# Patient Record
Sex: MALE | Race: WHITE | NOT HISPANIC OR LATINO | Employment: FULL TIME | URBAN - METROPOLITAN AREA
[De-identification: names, ages, dates, MRNs, and addresses within clinical notes are randomized per-mention and may not be internally consistent; named-entity substitution may affect disease eponyms.]

---

## 2017-01-20 ENCOUNTER — OFFICE VISIT (OUTPATIENT)
Dept: FAMILY MEDICINE | Facility: CLINIC | Age: 37
End: 2017-01-20
Payer: COMMERCIAL

## 2017-01-20 VITALS
BODY MASS INDEX: 35.21 KG/M2 | OXYGEN SATURATION: 98 % | SYSTOLIC BLOOD PRESSURE: 116 MMHG | HEIGHT: 72 IN | TEMPERATURE: 97.9 F | WEIGHT: 260 LBS | HEART RATE: 75 BPM | DIASTOLIC BLOOD PRESSURE: 74 MMHG

## 2017-01-20 DIAGNOSIS — M25.562 ACUTE PAIN OF LEFT KNEE: Primary | ICD-10-CM

## 2017-01-20 DIAGNOSIS — K21.9 GASTROESOPHAGEAL REFLUX DISEASE WITHOUT ESOPHAGITIS: ICD-10-CM

## 2017-01-20 PROCEDURE — 99214 OFFICE O/P EST MOD 30 MIN: CPT | Performed by: PHYSICIAN ASSISTANT

## 2017-01-20 RX ORDER — TRAMADOL HYDROCHLORIDE 50 MG/1
TABLET ORAL
Qty: 30 TABLET | Refills: 0 | Status: SHIPPED | OUTPATIENT
Start: 2017-01-20 | End: 2017-09-26

## 2017-01-20 NOTE — PROGRESS NOTES
SUBJECTIVE:                                                    Paco Ponce is a 36 year old male who presents to clinic today for the following health issues:    GERD/Heartburn     Onset: was at dentist 2 days ago, said has a lot of acid in mouth     Description:     Burning in chest: no    Intensity: mild    Progression of Symptoms: same    Accompanying Signs & Symptoms:  Does it feel like food gets stuck: no  Nausea: no  Vomiting (bloody?): no  Abdominal Pain: no  Black-Tarry stools: no:  Bloody stools: no   History:   Previous ulcers: no    Precipitating factors:   Caffeine use: YES- has cut back recently  Alcohol use: no  NSAID/Aspirin use: YES  Tobacco use: no  Worse with no particular food or drink.    Alleviating factors:  none         Therapies Tried and outcome:chewable antacid    Patient reports needing significant dental work over the past few years. He has evidence of enamel loss on his teeth, and his dentist is concerned that he may have reflux.    He states that he drinks 2 sodas per day, as well as coffee in the morning (approx 10 oz). No longer drinks energy drinks    Occasionally gets heartburn symptoms, depending on what he eats. Certain barbecue sauces have been a trigger. He states that chocolate, peppermint, tomato-based foods are not triggers  Does not drink alcohol frequently  Does not use tobacco  Has been using NSAIDs more recently due to joint pains    He denies hoarse voice, trouble swallowing, abdominal pain, change in stools, halitosis    Has not been on a PPI in the past    PSH: History of intestinal resection secondary to ?diverticulitis    Joint Pain     Onset: 2 weeks     Description:   Location: left knee  Character: Sharp and Dull ache    Intensity: 8/10    Progression of Symptoms: same    Accompanying Signs & Symptoms:  Other symptoms: none   History:   Previous similar pain: no       Precipitating factors:   Trauma or overuse: no     Alleviating factors:  Improved by:  nothing       Therapies Tried and outcome: massage, naproxen    Patient reports severe knee pain over the past 2 weeks  He does not recall a particular injury, but does a lot of active work (works in [employer])  Does put a lot of pressure on his knees while working  Unable to cross his leg to put on his shoes due to pain. Pain becomes sharp and more severe with walking and going up/down stairs  Sitting is the most comfortable position for him, but does still have a constant ache at rest  Has been favoring his right leg as a result of the pain  Does feel snapping sensation in the left knee when he walks  Pain localized to the medial aspect of the knee, anterior and posterior  Leg has not given way on him, but he is concerned about that and states he feels that the knee is unstable  No prior history of knee problems  Denies any other joint pains  Denies swelling, redness, warmth  Pain is even severe during the middle of the night. Pain also severe as soon as he gets up in the morning    No ankle/hip pain.    No FH of ortho problems.    Problem list and histories reviewed & adjusted, as indicated.  Additional history: as documented    Patient Active Problem List   Diagnosis     CARDIOVASCULAR SCREENING; LDL GOAL LESS THAN 160     Hypertension goal BP (blood pressure) < 140/90     Obesity     Past Surgical History   Procedure Laterality Date     Appendectomy       Gi surgery       Hernia repair       Ent surgery         Social History   Substance Use Topics     Smoking status: Never Smoker      Smokeless tobacco: Never Used     Alcohol Use: 0.0 oz/week     0 Standard drinks or equivalent per week      Comment: per wk 1-2     No family history on file.      Current Outpatient Prescriptions   Medication Sig Dispense Refill     omeprazole (PRILOSEC) 20 MG CR capsule Take 1 capsule (20 mg) by mouth daily 90 capsule 1     traMADol (ULTRAM) 50 MG tablet Take 1-2 tablets by mouth at bedtime as needed 30 tablet 0     metoprolol  (TOPROL-XL) 25 MG 24 hr tablet Take 1 tablet (25 mg) by mouth daily 90 tablet 3     No Known Allergies    ROS:  Constitutional, HEENT, cardiovascular, pulmonary, gi and gu systems are negative, except as otherwise noted.    OBJECTIVE:                                                    /74 mmHg  Pulse 75  Temp(Src) 97.9  F (36.6  C) (Oral)  Ht 6' (1.829 m)  Wt 260 lb (117.935 kg)  BMI 35.25 kg/m2  SpO2 98%  Body mass index is 35.25 kg/(m^2).  GENERAL: healthy, alert and no distress  HENT: ear canals and TM's normal, nose and mouth without ulcers or lesions  NECK: no adenopathy, no asymmetry, masses, or scars and thyroid normal to palpation  RESP: lungs clear to auscultation - no rales, rhonchi or wheezes  CV: regular rate and rhythm, normal S1 S2, no S3 or S4, no murmur, click or rub, no peripheral edema and peripheral pulses strong  ABDOMEN: soft, nontender, no hepatosplenomegaly, no masses and bowel sounds normal  MS: Tenderness to palpation of medial joint line of left knee. Medial hamstring tendon also tender to palpation. No effusions. Negative patellar grind test. Negative anterior/posterior drawer. No pain with varus/valgus stress. Pain with flexion and extension of the knee. Normal hip and ankle range of motion  SKIN: no suspicious lesions or rashes    Diagnostic Test Results:  No results found for this or any previous visit (from the past 24 hour(s)).     ASSESSMENT/PLAN:                                                      1. Acute pain of left knee  Severity of pain and mechanical symptoms are concerning for possible meniscal tear. We'll proceed with MRI for evaluation. Patient was advised to continue with ice and over-the-counter pain relievers. Prescription for tramadol given for nighttime use only. Discussed possible consult with orthopedics depending on MRI results. Also discussed possibility of starting physical therapy  - MR Knee Left w/o Contrast; Future  - traMADol (ULTRAM) 50 MG  tablet; Take 1-2 tablets by mouth at bedtime as needed  Dispense: 30 tablet; Refill: 0    2. Gastroesophageal reflux disease without esophagitis  Dentist has noticed loss of enamel in teeth. Patient has required multiple dental procedures. Dentist has advised him on proper brushing technique. Recommend trial of PPI in the event that he has an element of silent reflux. Does have infrequent episodes of heartburn. Reviewed lifestyle changes to help with reflux, including dietary changes and elevating the head of the bed. Discussed possible side effects of Prilosec  - omeprazole (PRILOSEC) 20 MG CR capsule; Take 1 capsule (20 mg) by mouth daily  Dispense: 90 capsule; Refill: 1    See Patient Instructions    Bianka Saavedra PA-C  [facility]

## 2017-01-20 NOTE — PATIENT INSTRUCTIONS
Lifestyle Changes for Controlling GERD  When you have GERD, stomach acid feels as if it s backing up toward your mouth. Whether or not you take medication to control your GERD, your symptoms can often be improved with lifestyle changes. Talk to your doctor about the following suggestions, which may help you get relief from your symptoms.  Raise Your Head    Reflux is more likely to strike when you re lying down flat, because stomach fluid can flow backward more easily. Raising the head of your bed 4 to 6 inches can help. To do this:    Slide blocks or books under the legs at the head of your bed. Or, place a wedge under the mattress. Many [employer] can make a suitable wedge for you. The wedge should run from your waist to the top of your head.    Don t just prop your head on several pillows. This increases pressure on your stomach. It can make GERD worse.  Watch Your Eating Habits  Certain foods may increase the acid in your stomach or relax the lower esophageal sphincter, making GERD more likely. It s best to avoid the following:    Coffee, tea, and carbonated drinks (with and without caffeine)    Fatty, fried, or spicy food    Mint, chocolate, onions, and tomatoes    Any other foods that seem to irritate your stomach or cause you pain  Relieve the Pressure    Eat smaller meals, even if you have to eat more often.    Don t lie down right after you eat. Wait a few hours for your stomach to empty.    Avoid tight belts and tight-fitting clothes.    Lose excess weight.  Tobacco and Alcohol  Avoid smoking tobacco and drinking alcohol. They can make GERD symptoms worse.    9278-5315 [employer]. [address] [place] [address], [place] [address]. All rights reserved. This information is not intended as a substitute for professional medical care. Always follow your healthcare professional's instructions.

## 2017-01-20 NOTE — MR AVS SNAPSHOT
After Visit Summary   1/20/2017    Paco Ponce    MRN: [id number]           Patient Information     Date Of Birth          1980        Visit Information        Provider Department      1/20/2017 11:00 AM Bianka Saavedra PA-C [facility] [place]        Today's Diagnoses     Acute pain of left knee    -  1     Gastroesophageal reflux disease without esophagitis           Care Instructions      Lifestyle Changes for Controlling GERD  When you have GERD, stomach acid feels as if it s backing up toward your mouth. Whether or not you take medication to control your GERD, your symptoms can often be improved with lifestyle changes. Talk to your doctor about the following suggestions, which may help you get relief from your symptoms.  Raise Your Head    Reflux is more likely to strike when you re lying down flat, because stomach fluid can flow backward more easily. Raising the head of your bed 4 to 6 inches can help. To do this:    Slide blocks or books under the legs at the head of your bed. Or, place a wedge under the mattress. Many [employer] can make a suitable wedge for you. The wedge should run from your waist to the top of your head.    Don t just prop your head on several pillows. This increases pressure on your stomach. It can make GERD worse.  Watch Your Eating Habits  Certain foods may increase the acid in your stomach or relax the lower esophageal sphincter, making GERD more likely. It s best to avoid the following:    Coffee, tea, and carbonated drinks (with and without caffeine)    Fatty, fried, or spicy food    Mint, chocolate, onions, and tomatoes    Any other foods that seem to irritate your stomach or cause you pain  Relieve the Pressure    Eat smaller meals, even if you have to eat more often.    Don t lie down right after you eat. Wait a few hours for your stomach to empty.    Avoid tight belts and tight-fitting clothes.    Lose excess weight.  Tobacco and Alcohol  Avoid  "smoking tobacco and drinking alcohol. They can make GERD symptoms worse.    8726-0934 [employer]. [address] [place] [address], [place] [address]. All rights reserved. This information is not intended as a substitute for professional medical care. Always follow your healthcare professional's instructions.              Follow-ups after your visit        Future tests that were ordered for you today     Open Future Orders        Priority Expected Expires Ordered    MR Knee Left w/o Contrast Routine  2018            Who to contact     If you have questions or need follow up information about today's clinic visit or your schedule please contact [facility] [place] directly at [phone].  Normal or non-critical lab and imaging results will be communicated to you by MyChart, letter or phone within 4 business days after the clinic has received the results. If you do not hear from us within 7 days, please contact the clinic through [employer] or phone. If you have a critical or abnormal lab result, we will notify you by phone as soon as possible.  Submit refill requests through [employer] or call your pharmacy and they will forward the refill request to us. Please allow 3 business days for your refill to be completed.          Additional Information About Your Visit        MyChart Information     [employer] lets you send messages to your doctor, view your test results, renew your prescriptions, schedule appointments and more. To sign up, go to [email or web address] . Click on \"Log in\" on the left side of the screen, which will take you to the Welcome page. Then click on \"Sign up Now\" on the right side of the page.     You will be asked to enter the access code listed below, as well as some personal information. Please follow the directions to create your username and password.     Your access code is: [email or web address]  Expires: 3/13/2017 11:19 AM     Your access code will  in 90 days. If you need " help or a new code, please call your [facility] or [phone].        Care EveryWhere ID     This is your Care EveryWhere ID. This could be used by other organizations to access your [place] medical records  [id number]        Your Vitals Were     Pulse Temperature Height BMI (Body Mass Index) Pulse Oximetry       75 97.9  F (36.6  C) (Oral) 6' (1.829 m) 35.25 kg/m2 98%        Blood Pressure from Last 3 Encounters:   01/20/17 116/74   12/13/16 122/82   09/28/16 136/88    Weight from Last 3 Encounters:   01/20/17 260 lb (117.935 kg)   12/13/16 255 lb (115.667 kg)   03/26/16 264 lb (119.75 kg)                 Today's Medication Changes          These changes are accurate as of: 1/20/17 11:43 AM.  If you have any questions, ask your nurse or doctor.               Start taking these medicines.        Dose/Directions    omeprazole 20 MG CR capsule   Commonly known as:  priLOSEC   Used for:  Gastroesophageal reflux disease without esophagitis   Started by:  Bianka Saavedra PA-C        Dose:  20 mg   Take 1 capsule (20 mg) by mouth daily   Quantity:  90 capsule   Refills:  1            Where to get your medicines      These medications were sent to [facility] [id number] [place] [facility] [place], [place] - [address]  [address] [facility] [address] [place] [address]     Phone:  [phone]    - omeprazole 20 MG CR capsule             Primary Care Provider Office Phone # Fax #    Zhao Kirkland Jr., [phone]       [facility] [address] [facility] [address]        Thank you!     Thank you for choosing [facility]  for your care. Our goal is always to provide you with excellent care. Hearing back from our patients is one way we can continue to improve our services. Please take a few minutes to complete the written survey that you may receive in the mail after your visit with us. Thank you!             Your Updated Medication List - Protect others around you: Learn how to safely use,  store and throw away your medicines at www.disposemymeds.org.          This list is accurate as of: 1/20/17 11:43 AM.  Always use your most recent med list.                   Brand Name Dispense Instructions for use    metoprolol 25 MG 24 hr tablet    TOPROL-XL    90 tablet    Take 1 tablet (25 mg) by mouth daily       omeprazole 20 MG CR capsule    priLOSEC    90 capsule    Take 1 capsule (20 mg) by mouth daily

## 2017-01-20 NOTE — NURSING NOTE
Chief Complaint   Patient presents with     Knee Pain     Heartburn       Initial /74 mmHg  Pulse 75  Temp(Src) 97.9  F (36.6  C) (Oral)  Ht 6' (1.829 m)  Wt 260 lb (117.935 kg)  BMI 35.25 kg/m2  SpO2 98% Estimated body mass index is 35.25 kg/(m^2) as calculated from the following:    Height as of this encounter: 6' (1.829 m).    Weight as of this encounter: 260 lb (117.935 kg).  BP completed using cuff size: cristy Toth MA

## 2017-01-23 ENCOUNTER — HOSPITAL ENCOUNTER (OUTPATIENT)
Dept: MRI IMAGING | Facility: CLINIC | Age: 37
Discharge: HOME | End: 2017-01-23
Attending: PHYSICIAN ASSISTANT | Admitting: PHYSICIAN ASSISTANT
Payer: COMMERCIAL

## 2017-01-23 DIAGNOSIS — M25.562 ACUTE PAIN OF LEFT KNEE: ICD-10-CM

## 2017-01-23 PROCEDURE — 73721 MRI JNT OF LWR EXTRE W/O DYE: CPT | Mod: LT

## 2017-01-25 ENCOUNTER — TELEPHONE (OUTPATIENT)
Dept: ORTHOPEDICS | Facility: CLINIC | Age: 37
End: 2017-01-25

## 2017-01-25 ENCOUNTER — OFFICE VISIT (OUTPATIENT)
Dept: ORTHOPEDICS | Facility: CLINIC | Age: 37
End: 2017-01-25
Payer: COMMERCIAL

## 2017-01-25 VITALS
HEIGHT: 72 IN | WEIGHT: 260 LBS | SYSTOLIC BLOOD PRESSURE: 124 MMHG | DIASTOLIC BLOOD PRESSURE: 86 MMHG | BODY MASS INDEX: 35.21 KG/M2

## 2017-01-25 DIAGNOSIS — S83.242A TEAR OF MEDIAL MENISCUS OF LEFT KNEE, CURRENT, UNSPECIFIED TEAR TYPE, INITIAL ENCOUNTER: Primary | ICD-10-CM

## 2017-01-25 DIAGNOSIS — S83.242A TEAR OF MEDIAL MENISCUS OF LEFT KNEE, UNSPECIFIED TEAR TYPE, UNSPECIFIED WHETHER OLD OR CURRENT TEAR, INITIAL ENCOUNTER: Primary | ICD-10-CM

## 2017-01-25 DIAGNOSIS — S83.249A TEAR OF MEDIAL MENISCUS OF KNEE, CURRENT, UNSPECIFIED LATERALITY, UNSPECIFIED TEAR TYPE, INITIAL ENCOUNTER: Primary | ICD-10-CM

## 2017-01-25 PROCEDURE — 99204 OFFICE O/P NEW MOD 45 MIN: CPT | Performed by: ORTHOPAEDIC SURGERY

## 2017-01-25 NOTE — NURSING NOTE
Chief Complaint   Patient presents with     Knee Pain     Left knee       Initial /86 mmHg  Ht 6' (1.829 m)  Wt 260 lb (117.935 kg)  BMI 35.25 kg/m2 Estimated body mass index is 35.25 kg/(m^2) as calculated from the following:    Height as of this encounter: 6' (1.829 m).    Weight as of this encounter: 260 lb (117.935 kg).  BP completed using cuff size: large

## 2017-01-25 NOTE — PROGRESS NOTES
HISTORY OF PRESENT ILLNESS:    Paco Ponce is a 36 year old male who is seen in consultation at the request of Bianka Saavedra PA-C for left knee pain.    Present symptoms: Pt states knee pain has been present for 2-3 weeks.  Pt denies any known injury. He has observed that twisting, getting up from sitting, deep bending increases the pain along the medial aspect of the knee. He has not had actual mechanical loose body sensation or locking. He does not any pain on the right knee. He started to limp.  He does commercial heating and air-conditioning mechanical work. He is constantly on his knees with deep bending. He does not have a radicular symptoms. No history of trauma. No significant effusion. No redness or warmth.    Treatments tried to this point: MRI, tramadol  Orthopedic PMH: no ortho surgeries     Past Medical History   Diagnosis Date     Hypertension        Past Surgical History   Procedure Laterality Date     Appendectomy       Hernia repair       Ent surgery       Gi surgery         No family history on file.    Social History     Social History     Marital Status:      Spouse Name: N/A     Number of Children: N/A     Years of Education: N/A     Occupational History     Not on file.     Social History Main Topics     Smoking status: Never Smoker      Smokeless tobacco: Never Used     Alcohol Use: 0.0 oz/week     0 Standard drinks or equivalent per week      Comment: per wk 1-2     Drug Use: No     Sexual Activity:     Partners: Female     Other Topics Concern     Not on file     Social History Narrative       Current Outpatient Prescriptions   Medication Sig Dispense Refill     omeprazole (PRILOSEC) 20 MG CR capsule Take 1 capsule (20 mg) by mouth daily 90 capsule 1     traMADol (ULTRAM) 50 MG tablet Take 1-2 tablets by mouth at bedtime as needed 30 tablet 0     metoprolol (TOPROL-XL) 25 MG 24 hr tablet Take 1 tablet (25 mg) by mouth daily 90 tablet 3       No Known Allergies    REVIEW OF  SYSTEMS:  CONSTITUTIONAL:  NEGATIVE for fever, chills, change in weight  INTEGUMENTARY/SKIN:  NEGATIVE for worrisome rashes, moles or lesions  EYES:  NEGATIVE for vision changes or irritation  ENT/MOUTH:  NEGATIVE for ear, mouth and throat problems  RESP:  NEGATIVE for significant cough or SOB  BREAST:  NEGATIVE for masses, tenderness or discharge  CV:  Hypertension. NEGATIVE for chest pain, palpitations or peripheral edema  GI:  Heartburn  :  Negative   MUSCULOSKELETAL:  See HPI above  NEURO:  NEGATIVE for weakness, dizziness or paresthesias  ENDOCRINE:  NEGATIVE for temperature intolerance, skin/hair changes  HEME/ALLERGY/IMMUNE:  NEGATIVE for bleeding problems  PSYCHIATRIC:  NEGATIVE for changes in mood or affect      PHYSICAL EXAM:  Ht 6' (1.829 m)  Wt 260 lb (117.935 kg)  BMI 35.25 kg/m2  Body mass index is 35.25 kg/(m^2).   GENERAL APPEARANCE: healthy, alert and no distress   HEENT: No apparent thyroid megaly. Clear sclera with normal ocular movement  RESPIRATORY: No labored breathing  SKIN: no suspicious lesions or rashes  NEURO: Normal strength and tone, mentation intact and speech normal  VASCULAR: Good pulses, and capillary refill   LYMPH: no lymphadenopathy   PSYCH:  mentation appears normal and affect normal/bright    MUSCULOSKELETAL:  His gait is suction with a limp  Pain restricting deep flexion of the left knee compared to the right knee  Extension is full  Laura's is negative however circumduction maneuver increases the pain  Ligaments are stable throughout bilaterally  No significant effusion is noted  Well localized posterior medial joint line pain  He also has mild patellofemoral crepitus and localized pain with patellofemoral compression, left knee  Distal neurovascular status is intact     ASSESSMENT:  Left knee pain with MRI scan documentation of medial meniscus tear    PLAN:  We visualized images of MRI scan and findings were thoroughly explained. With the lack of other obvious  pathology, namely, articular cartilage defect or ligamentous injuries, his symptoms were felt to be os likely coming from the meniscus pathology.  In his case, doing knee arthroscopy was felt to be most appropriate. Each of the surgery and potential complications were thoroughly discussed.  Even though we discussed possible meniscus repair as an option, most likely this will be trimming of the torn meniscus tissue.  What to expect from the surgery was informed.  We anticipate about two weeks of time off from work after the operation.  All the questions were answered.  We will try to get him on the schedule sometime next week.      Imaging Interpretation:     Recent Results (from the past 744 hour(s))   MR Knee Left w/o Contrast    Narrative    MR KNEE LEFT WITHOUT CONTRAST January 23, 2017 5:52 PM    HISTORY: Acute left knee pain, pain in left knee.    TECHNIQUE: Sagittal proton density and T2, coronal T1, and coronal and  transverse fat suppressed T2 weighted images.    COMPARISON: None.    FINDINGS:   Medial Meniscus: Ill-defined obliquely oriented tear posterior horn  medial meniscus reaching both the superior and inferior articular  surfaces likely a flap type tear.    Lateral Meniscus: No tear, displaced fragment, or extrusion.       Anterior Cruciate Ligament: Intact.     Posterior Cruciate Ligament: Intact.     Medial Collateral Ligament: Intact.    Lateral Collateral Ligament Complex, Popliteus Tendon: The fibular  collateral ligament, biceps femoris tendon, popliteal tendon, and  iliotibial band are intact.    Osseous Structures and Cartilaginous Surfaces: Articular cartilage  surfaces in the medial, lateral, and patellofemoral compartments  appear within normal limits. Marrow signal is within normal limits.    Extensor Mechanism: The quadriceps and infrapatellar tendons are  intact. The medial and lateral patellar retinacula appear  unremarkable.    Joint Space: There is a physiological amount of fluid in  the joint  space.  No definite articular bodies are demonstrated.    Additional Findings: No semimembranosus-tibial collateral ligament or  pes anserine bursitis. Very small Baker's cyst.      Impression    IMPRESSION:    1. Obliquely oriented tear posterior horn medial meniscus reaching  both superior and inferior articular surfaces likely a flap type tear.  2. Very small Peters's cyst.    MD Yordan RUIZ MD  Department of Orthopedic Surgery        Disclaimer: This note consists of symbols derived from keyboarding, dictation and/or voice recognition software. As a result, there may be errors in the script that have gone undetected. Please consider this when interpreting information found in this chart.

## 2017-01-25 NOTE — MR AVS SNAPSHOT
"              After Visit Summary   1/25/2017    Paco Ponce    MRN: [id number]           Patient Information     Date Of Birth          1980        Visit Information        Provider Department      1/25/2017 10:20 AM Yordan Main MD [facility] ORTHOPEDIC SURGERY        Care Instructions    We discussed knee arthroscopy. Here are a few common questions/concerns people have regarding this procedure:    Crutches: Crutches are used for comfort and safety. You can wean off according to your own judgement. If there is a specific weight bear restriction, you will be informed.    Dressing: Very rarely, there could be a significant drainage of bloody fluid. This is due to the fact that we use a lot of saline at the time of the surgery. Please don't be alarmed if you see the drainage in the amount more than you expected. The only thing you need to do is to enforce the dressing with additional dressing material (extra gauze, old towel or even diapers) on top of the original dressing without removing it. You are recommended to leave the dressing for 2 days before starting showers. Put  band aids on the holes after each shower until the holes are closed which takes 7- 10 days. During this time, you should not soak the knee, that is, no bathing.    Medication: You will be given a prescription for narcotic pain medication(Tylenol #3, Vicodin or Percocet) from the hospital. Most of people take them for a day or two at the most. After that, using it at night for sleep would be appropriate. From the beginning, you could rely on medications such as Ibuprofen (Advil) or Naproxen(Aleve) instead of narcotic pain medication or along with narcotic medication so that you can be off narcotic pain medication.    Pain: Typically pain is mild but very different for each individual. The first 2 days you may have less pain than the subsequent days. It is helpful not to \"over do\" right after the surgery even if you have " "very little to no pain. Taking things gradually is better than being too aggressive with activities. Icing and elevation will help with pain. For swelling control, icing is not as effective after 3-4 days after the surgery.    Driving: It would be mostly up to your judgement. You can drive whenever you are off the narcotic pain medication and you have the control of the leg.                            Follow-ups after your visit        Who to contact     If you have questions or need follow up information about today's clinic visit or your schedule please contact [facility] ORTHOPEDIC SURGERY directly at [phone].  Normal or non-critical lab and imaging results will be communicated to you by mWaterhart, letter or phone within 4 business days after the clinic has received the results. If you do not hear from us within 7 days, please contact the clinic through [facility] or phone. If you have a critical or abnormal lab result, we will notify you by phone as soon as possible.  Submit refill requests through [facility] or call your pharmacy and they will forward the refill request to us. Please allow 3 business days for your refill to be completed.          Additional Information About Your Visit        mWaterharRogate Information     [facility] lets you send messages to your doctor, view your test results, renew your prescriptions, schedule appointments and more. To sign up, go to [email or web address] . Click on \"Log in\" on the left side of the screen, which will take you to the Welcome page. Then click on \"Sign up Now\" on the right side of the page.     You will be asked to enter the access code listed below, as well as some personal information. Please follow the directions to create your username and password.     Your access code is: [email or web address]  Expires: 3/13/2017 11:19 AM     Your access code will  in 90 days. If you need help or a new code, please call your [place] clinic or [phone].        Care EveryWhere " ID     This is your Care EveryWhere ID. This could be used by other organizations to access your [facility] medical records  [id number]        Your Vitals Were     Height BMI (Body Mass Index)                6' (1.829 m) 35.25 kg/m2           Blood Pressure from Last 3 Encounters:   01/25/17 124/86   01/20/17 116/74   12/13/16 122/82    Weight from Last 3 Encounters:   01/25/17 260 lb (117.935 kg)   01/20/17 260 lb (117.935 kg)   12/13/16 255 lb (115.667 kg)              Today, you had the following     No orders found for display       Primary Care Provider Office Phone # Fax #    Zhao Kirkalnd Jr., [phone]       [facility] [address] [address]        Thank you!     Thank you for choosing [facility] ORTHOPEDIC SURGERY  for your care. Our goal is always to provide you with excellent care. Hearing back from our patients is one way we can continue to improve our services. Please take a few minutes to complete the written survey that you may receive in the mail after your visit with us. Thank you!             Your Updated Medication List - Protect others around you: Learn how to safely use, store and throw away your medicines at www.disposemymeds.org.          This list is accurate as of: 1/25/17 10:40 AM.  Always use your most recent med list.                   Brand Name Dispense Instructions for use    metoprolol 25 MG 24 hr tablet    TOPROL-XL    90 tablet    Take 1 tablet (25 mg) by mouth daily       omeprazole 20 MG CR capsule    priLOSEC    90 capsule    Take 1 capsule (20 mg) by mouth daily       traMADol 50 MG tablet    ULTRAM    30 tablet    Take 1-2 tablets by mouth at bedtime as needed

## 2017-01-25 NOTE — TELEPHONE ENCOUNTER
Scheduled surgery for left knee arthroscopy on 2/01/2017 with Dr. Main @ [facility] @ 11:15.  Surgery education packet provided to patient.

## 2017-01-25 NOTE — PATIENT INSTRUCTIONS
"We discussed knee arthroscopy. Here are a few common questions/concerns people have regarding this procedure:    Crutches: Crutches are used for comfort and safety. You can wean off according to your own judgement. If there is a specific weight bear restriction, you will be informed.    Dressing: Very rarely, there could be a significant drainage of bloody fluid. This is due to the fact that we use a lot of saline at the time of the surgery. Please don't be alarmed if you see the drainage in the amount more than you expected. The only thing you need to do is to enforce the dressing with additional dressing material (extra gauze, old towel or even diapers) on top of the original dressing without removing it. You are recommended to leave the dressing for 2 days before starting showers. Put  band aids on the holes after each shower until the holes are closed which takes 7- 10 days. During this time, you should not soak the knee, that is, no bathing.    Medication: You will be given a prescription for narcotic pain medication(Tylenol #3, Vicodin or Percocet) from the hospital. Most of people take them for a day or two at the most. After that, using it at night for sleep would be appropriate. From the beginning, you could rely on medications such as Ibuprofen (Advil) or Naproxen(Aleve) instead of narcotic pain medication or along with narcotic medication so that you can be off narcotic pain medication.    Pain: Typically pain is mild but very different for each individual. The first 2 days you may have less pain than the subsequent days. It is helpful not to \"over do\" right after the surgery even if you have very little to no pain. Taking things gradually is better than being too aggressive with activities. Icing and elevation will help with pain. For swelling control, icing is not as effective after 3-4 days after the surgery.    Driving: It would be mostly up to your judgement. You can drive whenever you are off the " narcotic pain medication and you have the control of the leg.

## 2017-01-30 ENCOUNTER — OFFICE VISIT (OUTPATIENT)
Dept: FAMILY MEDICINE | Facility: CLINIC | Age: 37
End: 2017-01-30
Payer: COMMERCIAL

## 2017-01-30 ENCOUNTER — THERAPY VISIT (OUTPATIENT)
Dept: PHYSICAL THERAPY | Facility: CLINIC | Age: 37
End: 2017-01-30
Payer: COMMERCIAL

## 2017-01-30 VITALS
HEIGHT: 72 IN | SYSTOLIC BLOOD PRESSURE: 128 MMHG | TEMPERATURE: 98.6 F | DIASTOLIC BLOOD PRESSURE: 80 MMHG | OXYGEN SATURATION: 98 % | HEART RATE: 82 BPM | WEIGHT: 260 LBS | BODY MASS INDEX: 35.21 KG/M2

## 2017-01-30 DIAGNOSIS — I10 HYPERTENSION GOAL BP (BLOOD PRESSURE) < 140/90: ICD-10-CM

## 2017-01-30 DIAGNOSIS — E66.01 MORBID OBESITY DUE TO EXCESS CALORIES (H): ICD-10-CM

## 2017-01-30 DIAGNOSIS — S83.242D TEAR OF MEDIAL MENISCUS OF LEFT KNEE, CURRENT, UNSPECIFIED TEAR TYPE, SUBSEQUENT ENCOUNTER: ICD-10-CM

## 2017-01-30 DIAGNOSIS — S83.249A TEAR OF MEDIAL MENISCUS OF KNEE: Primary | ICD-10-CM

## 2017-01-30 DIAGNOSIS — Z01.818 PREOP GENERAL PHYSICAL EXAM: Primary | ICD-10-CM

## 2017-01-30 PROCEDURE — 97110 THERAPEUTIC EXERCISES: CPT | Mod: GP | Performed by: PHYSICAL THERAPIST

## 2017-01-30 PROCEDURE — 97161 PT EVAL LOW COMPLEX 20 MIN: CPT | Mod: GP | Performed by: PHYSICAL THERAPIST

## 2017-01-30 PROCEDURE — 99214 OFFICE O/P EST MOD 30 MIN: CPT | Performed by: FAMILY MEDICINE

## 2017-01-30 ASSESSMENT — ACTIVITIES OF DAILY LIVING (ADL)
SIT WITH YOUR KNEE BENT: ACTIVITY IS VERY DIFFICULT
WEAKNESS: THE SYMPTOM AFFECTS MY ACTIVITY MODERATELY
RISE FROM A CHAIR: ACTIVITY IS SOMEWHAT DIFFICULT
GO DOWN STAIRS: ACTIVITY IS SOMEWHAT DIFFICULT
SQUAT: ACTIVITY IS FAIRLY DIFFICULT
STIFFNESS: THE SYMPTOM AFFECTS MY ACTIVITY MODERATELY
GIVING WAY, BUCKLING OR SHIFTING OF KNEE: I DO NOT HAVE THE SYMPTOM
RAW_SCORE: 39
KNEE_ACTIVITY_OF_DAILY_LIVING_SCORE: 55.71
AS_A_RESULT_OF_YOUR_KNEE_INJURY,_HOW_WOULD_YOU_RATE_YOUR_CURRENT_LEVEL_OF_DAILY_ACTIVITY?: ABNORMAL
PAIN: THE SYMPTOM AFFECTS MY ACTIVITY MODERATELY
KNEEL ON THE FRONT OF YOUR KNEE: ACTIVITY IS FAIRLY DIFFICULT
HOW_WOULD_YOU_RATE_THE_CURRENT_FUNCTION_OF_YOUR_KNEE_DURING_YOUR_USUAL_DAILY_ACTIVITIES_ON_A_SCALE_FROM_0_TO_100_WITH_100_BEING_YOUR_LEVEL_OF_KNEE_FUNCTION_PRIOR_TO_YOUR_INJURY_AND_0_BEING_THE_INABILITY_TO_PERFORM_ANY_OF_YOUR_USUAL_DAILY_ACTIVITIES?: 70
WALK: ACTIVITY IS SOMEWHAT DIFFICULT
STAND: ACTIVITY IS MINIMALLY DIFFICULT
KNEE_ACTIVITY_OF_DAILY_LIVING_SUM: 39
HOW_WOULD_YOU_RATE_THE_OVERALL_FUNCTION_OF_YOUR_KNEE_DURING_YOUR_USUAL_DAILY_ACTIVITIES?: ABNORMAL
LIMPING: THE SYMPTOM AFFECTS MY ACTIVITY MODERATELY
GO UP STAIRS: ACTIVITY IS SOMEWHAT DIFFICULT
SWELLING: I DO NOT HAVE THE SYMPTOM

## 2017-01-30 NOTE — MR AVS SNAPSHOT
After Visit Summary   1/30/2017    Paco Ponce    MRN: [id number]           Patient Information     Date Of Birth          1980        Visit Information        Provider Department      1/30/2017 10:00 AM Zhao Kirkland Jr., MD [facility]        Today's Diagnoses     Preop general physical exam    -  1     Tear of medial meniscus of left knee, current, unspecified tear type, subsequent encounter         Hypertension goal BP (blood pressure) < 140/90         Morbid obesity due to excess calories (H)           Care Instructions      Before Your Surgery      Call your surgeon if there is any change in your health. This includes signs of a cold or flu (such as a sore throat, runny nose, cough, rash or fever).    Do not smoke, drink alcohol or take over the counter medicine (unless your surgeon or primary care doctor tells you to) for the 24 hours before and after surgery.    If you take prescribed drugs: Follow your doctor s orders about which medicines to take and which to stop until after surgery.    Eating and drinking prior to surgery: follow the instructions from your surgeon    Take a shower or bath the night before surgery. Use the soap your surgeon gave you to gently clean your skin. If you do not have soap from your surgeon, use your regular soap. Do not shave or scrub the surgery site.  Wear clean pajamas and have clean sheets on your bed.         Follow-ups after your visit        Your next 10 appointments already scheduled     Feb 06, 2017 10:00 AM   LADAN Extremity with Jp Chen PT   [facility]    [address] [address]   [phone]            Feb 07, 2017  9:20 AM   Return Visit with Emiliano Gallardo PA-C   [facility] ORTHOPEDIC SURGERY ([facility])    [address]  [facility] [address]   [phone]              Who to contact     If you have  "questions or need follow up information about today's clinic visit or your schedule please contact [facility] [place] directly at [phone].  Normal or non-critical lab and imaging results will be communicated to you by MyChart, letter or phone within 4 business days after the clinic has received the results. If you do not hear from us within 7 days, please contact the clinic through [employer] or phone. If you have a critical or abnormal lab result, we will notify you by phone as soon as possible.  Submit refill requests through [facility] or call your pharmacy and they will forward the refill request to us. Please allow 3 business days for your refill to be completed.          Additional Information About Your Visit        Sofie BiosciencesharTNT Crowd Information     [facility] [facility] lets you send messages to your doctor, view your test results, renew your prescriptions, schedule appointments and more. To sign up, go to [email or web address] . Click on \"Log in\" on the left side of the screen, which will take you to the Welcome page. Then click on \"Sign up Now\" on the right side of the page.     You will be asked to enter the access code listed below, as well as some personal information. Please follow the directions to create your username and password.     Your access code is: [email or web address]  Expires: 3/13/2017 11:19 AM     Your access code will  in 90 days. If you need help or a new code, please call your [facility] clinic or [phone].        Care EveryWhere ID     This is your Care EveryWhere ID. This could be used by other organizations to access your [facility] medical records  [id number]        Your Vitals Were     Pulse Temperature Height BMI (Body Mass Index) Pulse Oximetry       82 98.6  F (37  C) (Oral) 6' (1.829 m) 35.25 kg/m2 98%        Blood Pressure from Last 3 Encounters:   17 128/80   17 124/86   17 116/74    Weight from Last 3 Encounters:   17 260 lb (117.935 kg)   17 260 lb (117.935 kg) "   01/20/17 260 lb (117.935 kg)              Today, you had the following     No orders found for display       Primary Care Provider Office Phone # Fax #    Zhao Kirkland Jr., [phone]       [facility] [address]  [facility] [address]        Thank you!     Thank you for choosing [facility]  for your care. Our goal is always to provide you with excellent care. Hearing back from our patients is one way we can continue to improve our services. Please take a few minutes to complete the written survey that you may receive in the mail after your visit with us. Thank you!             Your Updated Medication List - Protect others around you: Learn how to safely use, store and throw away your medicines at www.disposemymeds.org.          This list is accurate as of: 1/30/17 10:41 AM.  Always use your most recent med list.                   Brand Name Dispense Instructions for use    metoprolol 25 MG 24 hr tablet    TOPROL-XL    90 tablet    Take 1 tablet (25 mg) by mouth daily       omeprazole 20 MG CR capsule    priLOSEC    90 capsule    Take 1 capsule (20 mg) by mouth daily       traMADol 50 MG tablet    ULTRAM    30 tablet    Take 1-2 tablets by mouth at bedtime as needed

## 2017-01-30 NOTE — PROGRESS NOTES
Subjective:    HPI Comments: Patient is a 36 year old male who presents with complaints of L general knee pain, lack of motion, and lack of strength. Symptoms began about 3 weeks with no known FELIX and have been worsening since initial occurrence. MRI revealed PMM tear. Surgery is set for 2/1/17. Pain is described as achy and is currently rated as 3/10 best (rest) and 8/10 worst (everything). Symptoms worsen with everything and improve with rest. Pain is constant and worsens as the day goes on. Patient wishes to return to walking, working, playing softball, and performing all ADL's without difficulty. Patient's general health is listed as good. PT has reviewed and confirmed patient's past medical history.                                         Pertinent medical history includes:  High blood pressure and migraines.  Medical allergies: no.  Other surgeries include:  None reported.  Current medications:  Pain medication and high blood pressure medication.  Current occupation is [employer]  .  Employment status: Waiting until after surgery for restrictions.  Primary job tasks include:  Prolonged standing, lifting, repetitive tasks and driving.    Barriers include:  None as reported by the patient.    Red flags:  None as reported by the patient.                      Objective:    System                                                Knee Evaluation:  ROM:      PROM    Hyperextension: Left: 8    Right:  10  Extension: Left: 0    Right:  0  Flexion: Left: 133    Right:  142                        General   Fair Quad motor control  Joint line and suprapatellar swelling  Walking with a limp (lacks full extension)  ROS    Assessment/Plan:      Patient is a 36 year old male with left side knee complaints.    Patient has the following significant findings with corresponding treatment plan.                Diagnosis 1:  Medial Meniscus Tear  Pain -  hot/cold therapy, self management, education and home program  Decreased  ROM/flexibility - therapeutic exercise, therapeutic activity and home program  Decreased strength - therapeutic exercise, therapeutic activities and home program  Impaired gait - gait training and home program  Impaired muscle performance - neuro re-education and home program  Decreased function - therapeutic activities and home program    Therapy Evaluation Codes:   1) History comprised of:   Personal factors that impact the plan of care:      Profession and Work status.    Comorbidity factors that impact the plan of care are:      None.     Medications impacting care: None.  2) Examination of Body Systems comprised of:   Body structures and functions that impact the plan of care:      Knee.   Activity limitations that impact the plan of care are:      Running, Sports, Squatting/kneeling and Stairs.  3) Clinical presentation characteristics are:   Stable/Uncomplicated.  4) Decision-Making    Low complexity using standardized patient assessment instrument and/or measureable assessment of functional outcome.  Cumulative Therapy Evaluation is: Low complexity.    Previous and current functional limitations:  (See Goal Flow Sheet for this information)    Short term and Long term goals: (See Goal Flow Sheet for this information)     Communication ability:  Patient appears to be able to clearly communicate and understand verbal and written communication and follow directions correctly.  Treatment Explanation - The following has been discussed with the patient:   RX ordered/plan of care  Anticipated outcomes  Possible risks and side effects  This patient would benefit from PT intervention to resume normal activities.   Rehab potential is good.    Frequency:  2 X week, once daily  Duration:  for 4 weeks  Discharge Plan:  Achieve all LTG.  Independent in home treatment program.  Reach maximal therapeutic benefit.    Please refer to the daily flowsheet for treatment today, total treatment time and time spent performing 1:1  timed codes.

## 2017-01-30 NOTE — PROGRESS NOTES
[facility]  [address] [address] [phone]  Dept: [phone]    PRE-OP EVALUATION:  Today's date: 2017    Paco Ponce (: 1980) presents for pre-operative evaluation assessment as requested by Dr. Main.  He requires evaluation and anesthesia risk assessment prior to undergoing surgery/procedure for treatment of lt knee miniscus tear.  Proposed procedure: Lt knee arthroscopy     Date of Surgery/ Procedure: 17  Time of Surgery/ Procedure: 10:15  Hospital/Surgical Facility: [facility]   Fax number for surgical facility:   Primary Physician: Zhao Kirkland Jr.  Type of Anesthesia Anticipated: Choice    Patient has a Health Care Directive or Living Will:  NO    1. NO - Do you have a history of heart attack, stroke, stent, bypass or surgery on an artery in the head, neck, heart or legs?  2. NO - Do you ever have any pain or discomfort in your chest?  3. NO - Do you have a history of  Heart Failure?  4. NO - Are you troubled by shortness of breath when: walking on the level, up a slight hill or at night?  5. NO - Do you currently have a cold, bronchitis or other respiratory infection?  6. NO - Do you have a cough, shortness of breath or wheezing?  7. NO - Do you sometimes get pains in the calves of your legs when you walk?  8. NO - Do you or anyone in your family have previous history of blood clots?  9. NO - Do you or does anyone in your family have a serious bleeding problem such as prolonged bleeding following surgeries or cuts?  10. NO - Have you ever had problems with anemia or been told to take iron pills?  11. NO - Have you had any abnormal blood loss such as black, tarry or bloody stools, or abnormal vaginal bleeding?  12. NO - Have you ever had a blood transfusion?  13. NO - Have you or any of your relatives ever had problems with anesthesia?  14. NO - Do you have sleep apnea, excessive snoring or daytime drowsiness?  15.  NO - Do you have any prosthetic heart valves?  16. NO - Do you have prosthetic joints?  17. NO - Is there any chance that you may be pregnant?      HPI:                                                      Brief HPI related to upcoming procedure: Paco developed acute left knee pain earlier this month with an MRI demonstrating a medial meniscus tear.  He is now subsequently scheduled for arthroscopy of the left knee.      See problem list for active medical problems.  Problems all longstanding and stable, except as noted/documented.  See ROS for pertinent symptoms related to these conditions.                                                                                                  .    MEDICAL HISTORY:                                                      Patient Active Problem List    Diagnosis Date Noted     Morbid obesity due to excess calories (H) 01/30/2017     Priority: Medium     CARDIOVASCULAR SCREENING; LDL GOAL LESS THAN 160 01/06/2015     Priority: Medium     Hypertension goal BP (blood pressure) < 140/90 01/06/2015     Priority: Medium      Past Medical History   Diagnosis Date     Hypertension      Past Surgical History   Procedure Laterality Date     Appendectomy       Hernia repair       Ent surgery       Gi surgery       Current Outpatient Prescriptions   Medication Sig Dispense Refill     omeprazole (PRILOSEC) 20 MG CR capsule Take 1 capsule (20 mg) by mouth daily 90 capsule 1     traMADol (ULTRAM) 50 MG tablet Take 1-2 tablets by mouth at bedtime as needed 30 tablet 0     metoprolol (TOPROL-XL) 25 MG 24 hr tablet Take 1 tablet (25 mg) by mouth daily 90 tablet 3     OTC products: None, except as noted above    No Known Allergies   Latex Allergy: NO    Social History   Substance Use Topics     Smoking status: Never Smoker      Smokeless tobacco: Never Used     Alcohol Use: 0.0 oz/week     0 Standard drinks or equivalent per week      Comment: per wk 1-2     History   Drug Use No       REVIEW OF  SYSTEMS:                                                    C: NEGATIVE for fever, chills, change in weight  I: NEGATIVE for worrisome rashes, moles or lesions  E: NEGATIVE for vision changes or irritation  E/M: NEGATIVE for ear, mouth and throat problems  R: NEGATIVE for significant cough or SOB  B: NEGATIVE for masses, tenderness or discharge  CV: NEGATIVE for chest pain, palpitations or peripheral edema  GI: NEGATIVE for nausea, abdominal pain, heartburn, or change in bowel habits  : NEGATIVE for frequency, dysuria, or hematuria  M: NEGATIVE for significant arthralgias or myalgia  N: NEGATIVE for weakness, dizziness or paresthesias  E: NEGATIVE for temperature intolerance, skin/hair changes  H: NEGATIVE for bleeding problems  P: NEGATIVE for changes in mood or affect    EXAM:                                                    /80 mmHg  Pulse 82  Temp(Src) 98.6  F (37  C) (Oral)  Ht 6' (1.829 m)  Wt 260 lb (117.935 kg)  BMI 35.25 kg/m2  SpO2 98%    GENERAL APPEARANCE: healthy, alert and no distress     EYES: EOMI, - PERRL     HENT: ear canals and TM's normal and nose and mouth without ulcers or lesions     NECK: no adenopathy, no asymmetry, masses, or scars and thyroid normal to palpation     RESP: lungs clear to auscultation - no rales, rhonchi or wheezes     CV: regular rates and rhythm, normal S1 S2, no S3 or S4 and no murmur, click or rub -     ABDOMEN:  soft, nontender, no HSM or masses and bowel sounds normal     MS: extremities normal- no gross deformities noted, no evidence of inflammation in joints, FROM in all extremities.     SKIN: no suspicious lesions or rashes     NEURO: Normal strength and tone, sensory exam grossly normal, mentation intact and speech normal     PSYCH: mentation appears normal. and affect normal/bright     LYMPHATICS: No axillary, cervical, inguinal, or supraclavicular nodes    DIAGNOSTICS:                                                    EKG: Not indicated due to  non-vascular surgery and low risk of event (age <65 and without cardiac risk factors)    Recent Labs   Lab Test  12/13/16   1127  08/24/15   1710   NA  142  140   POTASSIUM  4.1  4.3   CR  0.99  1.05        IMPRESSION:                                                    Reason for surgery/procedure: left knee medial meniscus tear   Diagnosis/reason for consult: HTN, morbid obesity    The proposed surgical procedure is considered INTERMEDIATE risk.    REVISED CARDIAC RISK INDEX  The patient has the following serious cardiovascular risks for perioperative complications such as (MI, PE, VFib and 3  AV Block):  No serious cardiac risks  INTERPRETATION: 0 risks: Class I (very low risk - 0.4% complication rate)    The patient has the following additional risks for perioperative complications:  No identified additional risks      ICD-10-CM    1. Preop general physical exam Z01.818    2. Tear of medial meniscus of left knee, current, unspecified tear type, subsequent encounter S83.242D    3. Hypertension goal BP (blood pressure) < 140/90 I10    4. Morbid obesity due to excess calories (H) E66.01        RECOMMENDATIONS:                                                          --Patient is to take all scheduled medications on the day of surgery EXCEPT for modifications listed below.    APPROVAL GIVEN to proceed with proposed procedure, without further diagnostic evaluation       Signed Electronically by: Jr Zhao Kirkland MD    Copy of this evaluation report is provided to requesting physician.    [facility] Preop Guidelines

## 2017-01-30 NOTE — NURSING NOTE
Chief Complaint   Patient presents with     Pre-Op Exam       Initial /80 mmHg  Pulse 82  Temp(Src) 98.6  F (37  C) (Oral)  Ht 6' (1.829 m)  Wt 260 lb (117.935 kg)  BMI 35.25 kg/m2  SpO2 98% Estimated body mass index is 35.25 kg/(m^2) as calculated from the following:    Height as of this encounter: 6' (1.829 m).    Weight as of this encounter: 260 lb (117.935 kg).  BP completed using cuff size: large

## 2017-01-30 NOTE — Clinical Note
[facility]  [address]  [place] [address] [phone]          January 30, 2017    RE:  Paco Ponce                                                                                                                                                       [address] [address] [address]            To whom it may concern:    Paco Ponce is under my professional care for    Preop general physical exam  Tear of medial meniscus of left knee, current, unspecified tear type, subsequent encounter  Hypertension goal BP (blood pressure) < 140/90  Morbid obesity due to excess calories (H) He  may return to work with the following: The employee is ABLE to return to work today.    When the patient returns to work, the following restrictions apply until 2/2/17:  A) Bend: Not at all (0 hours)  B) Squat: Not at all (0 hours)  C) Walk/Stand: Occasionally (1-3 hours)  D) Reach Above Shoulders: Occasionally (1-3 hours)  E) Lift, carry, push, and pull no more than:  11-20 lbs.    Sincerely,        Zhao Kirkland Jr, MD

## 2017-02-01 ENCOUNTER — TRANSFERRED RECORDS (OUTPATIENT)
Dept: HEALTH INFORMATION MANAGEMENT | Facility: CLINIC | Age: 37
End: 2017-02-01

## 2017-02-02 ENCOUNTER — TELEPHONE (OUTPATIENT)
Dept: ORTHOPEDICS | Facility: CLINIC | Age: 37
End: 2017-02-02

## 2017-02-02 NOTE — TELEPHONE ENCOUNTER
Spoke with patient.  Doing well, no questions at this time.  Offered number for nurse triage and confirmed follow up appointment.    Emiliano Gallardo PA-C  [facility] - Surgery

## 2017-02-06 ENCOUNTER — THERAPY VISIT (OUTPATIENT)
Dept: PHYSICAL THERAPY | Facility: CLINIC | Age: 37
End: 2017-02-06
Payer: COMMERCIAL

## 2017-02-06 DIAGNOSIS — M23.204 OLD PERIPHERAL TEAR OF MEDIAL MENISCUS OF LEFT KNEE: Primary | ICD-10-CM

## 2017-02-06 PROCEDURE — 97110 THERAPEUTIC EXERCISES: CPT | Mod: GP | Performed by: PHYSICAL THERAPIST

## 2017-02-06 PROCEDURE — 97010 HOT OR COLD PACKS THERAPY: CPT | Mod: GP | Performed by: PHYSICAL THERAPIST

## 2017-02-07 ENCOUNTER — OFFICE VISIT (OUTPATIENT)
Dept: ORTHOPEDICS | Facility: CLINIC | Age: 37
End: 2017-02-07
Payer: COMMERCIAL

## 2017-02-07 DIAGNOSIS — Z47.89 ORTHOPEDIC AFTERCARE: ICD-10-CM

## 2017-02-07 DIAGNOSIS — M25.562 ARTHRALGIA OF LEFT LOWER LEG: Primary | ICD-10-CM

## 2017-02-07 PROCEDURE — 99024 POSTOP FOLLOW-UP VISIT: CPT | Performed by: PHYSICIAN ASSISTANT

## 2017-02-07 RX ORDER — OXYCODONE AND ACETAMINOPHEN 5; 325 MG/1; MG/1
1 TABLET ORAL EVERY 4 HOURS PRN
Qty: 18 TABLET | Refills: 0 | Status: SHIPPED | OUTPATIENT
Start: 2017-02-07 | End: 2017-09-26

## 2017-02-07 NOTE — PROGRESS NOTES
HISTORY OF PRESENT ILLNESS:    Paco Ponce is a 36 year old male who is seen in follow up for L knee arthroscopy, partial medial meniscetomy, DOS 2/01/2017, Dr. Main.  Present symptoms: Pt reports swollen and sore, limping with crutches..  Treatments include RICE and PT.  Using crutches for ambulation.  Some pain at back of knee and in calve with walking. Does note migraine HA since surgery, has hx.  Denies Chest pain, Calve pain, Fever, Chills.    PHYSICAL EXAM:  There were no vitals taken for this visit.  There is no weight on file to calculate BMI.   GENERAL APPEARANCE: healthy, alert and no distress   PSYCH:  mentation appears normal and affect normal/bright    MSK:  Left:  Knee.  Ambulates: Slight limp with PWBcrutches..  Incision clean and dry, no skin closure present, healing.  Appropriate incisional erythema.   Yes Ecchymosis mild at portals, resolving..  Pain in lateral gastroc with pressure, no redness, lumps or pain at rest.  Edema mild effusion  at knee, none at ankle.  CMS: bryanna incisional numbness, otherwise grossly intact.  AROM Flexion mild decrease in flexion..    IMAGING INTERPRETATION:  None today.     ASSESSMENT:  Paco Ponce is a 36 year old male S/P L knee arthroscopy, partial medial meniscetomy, DOS 2/01/2017, Dr. Main.  Healing.    PLAN:  - Surgery discussed, images reviewed if applicable, and all questions were answered at this time.  - Care instructions given and verbally acknowledged.  - Medications: refill Oxycodone with instructions to taper quickly.  - Physical Therapy: As directed at discharge.  - AAT.  - RTW letter written, pt leaves STD paperwork.    Return to clinic PRHOMER Gallardo PA-C    Dept. Orthopedic Surgery  [facility]   2/7/2017

## 2017-02-07 NOTE — Clinical Note
[facility]  [address]  [place] [facility] [address]  [phone]          February 7, 2017    RE:  Paco Ponce                                                                                                                                                       [address] [address]            To whom it may concern:    Paco Ponce is under my professional care for Left knee arthroscopy. He  may return to work with the following: The employee is UNABLE to return to work until the anticipated date of 2/23/2017.    When the patient returns to work, there may be restrictions depending on progress.    Sincerely,        Emiliano Gallardo PA-C

## 2017-02-07 NOTE — PATIENT INSTRUCTIONS
Incision Care:  Sutures were removed and Steri-Strips applied in usual fashion.  Keep dry 24-48 hours.  Showering ok after that time, however no soaking or scrubbing of incision for 1 weeks.  Steri-strips will most likely fall off on their own, however they may be removed after 1 weeks with rubbing alcohol if they have not.    Gradually increase your activities as you can tolerated them, starting at a level well below what you would normally do.   Follow up as needed in clinic.

## 2017-02-08 ENCOUNTER — TELEPHONE (OUTPATIENT)
Dept: ORTHOPEDICS | Facility: CLINIC | Age: 37
End: 2017-02-08

## 2017-02-08 NOTE — TELEPHONE ENCOUNTER
I called him back and he informed me that he received a call from the insurance company that the forms need to be faxed to a different number than what is on the paperwork. THe new number is [phone] Attn: [place].      Jorge Post, ATC

## 2017-02-13 ENCOUNTER — THERAPY VISIT (OUTPATIENT)
Dept: PHYSICAL THERAPY | Facility: CLINIC | Age: 37
End: 2017-02-13
Payer: COMMERCIAL

## 2017-02-13 DIAGNOSIS — S83.249A TEAR OF MEDIAL MENISCUS OF KNEE: ICD-10-CM

## 2017-02-13 PROCEDURE — 97110 THERAPEUTIC EXERCISES: CPT | Mod: GP | Performed by: PHYSICAL THERAPIST

## 2017-02-13 PROCEDURE — 97010 HOT OR COLD PACKS THERAPY: CPT | Mod: GP | Performed by: PHYSICAL THERAPIST

## 2017-02-13 NOTE — TELEPHONE ENCOUNTER
Patient calls checking status of disability forms. He states he asked to get a return call when they were faxed and wanted a hard copy for himself.     Apologized for the confusion. Informed forms were faxed to [employer] on 2/9/17. Forms have not been scanned yet, so once that is done, can mail him a copy.   He also states employer is requiring he be seen before going back to work on 2/23/17 and requesting appointment. Appointment scheduled.     Please watch for scanned forms.     BRIE Cedeño RN

## 2017-02-13 NOTE — MR AVS SNAPSHOT
"              After Visit Summary   2/13/2017    Paco Ponce    MRN: [id number]           Patient Information     Date Of Birth          1980        Visit Information        Provider Department      2/13/2017 10:00 AM Jp Chen PT [facility] [place]        Today's Diagnoses     Tear of medial meniscus of knee           Follow-ups after your visit        Your next 10 appointments already scheduled     Feb 16, 2017 10:50 AM CST   LAD[facility] Extremity with Jp Chen PT   [facility] ([facility] [place])    [address] [facility] [facility] [id number] [phone]              Who to contact     If you have questions or need follow up information about today's clinic visit or your schedule please contact [facility] [place] directly at [phone].  Normal or non-critical lab and imaging results will be communicated to you by Ticketlandhart, letter or phone within 4 business days after the clinic has received the results. If you do not hear from us within 7 days, please contact the clinic through MyChart or phone. If you have a critical or abnormal lab result, we will notify you by phone as soon as possible.  Submit refill requests through [facility] or call your pharmacy and they will forward the refill request to us. Please allow 3 business days for your refill to be completed.          Additional Information About Your Visit        MyChart Information     [facility] [employer] lets you send messages to your doctor, view your test results, renew your prescriptions, schedule appointments and more. To sign up, go to [email or web address] . Click on \"Log in\" on the left side of the screen, which will take you to the Welcome page. Then click on \"Sign up Now\" on the right side of the page.     You will be asked to enter the access code listed below, as well as some personal information. Please follow the directions to create your username and password.     Your access code " is: [email or web address]  Expires: 3/13/2017 11:19 AM     Your access code will  in 90 days. If you need help or a new code, please call your [facility] clinic or [phone].        Care EveryWhere ID     This is your Care EveryWhere ID. This could be used by other organizations to access your [facility] medical records  [id number]         Blood Pressure from Last 3 Encounters:   17 128/80   17 124/86   17 116/74    Weight from Last 3 Encounters:   17 117.9 kg (260 lb)   17 117.9 kg (260 lb)   17 117.9 kg (260 lb)              We Performed the Following     HOT OR COLD PACKS THERAPY     THERAPEUTIC EXERCISES        Primary Care Provider Office Phone # Fax #    Zhao Kirkland Jr., [phone]       [facility] [address] [address]        Thank you!     Thank you for choosing [facility]  for your care. Our goal is always to provide you with excellent care. Hearing back from our patients is one way we can continue to improve our services. Please take a few minutes to complete the written survey that you may receive in the mail after your visit with us. Thank you!             Your Updated Medication List - Protect others around you: Learn how to safely use, store and throw away your medicines at www.disposemymeds.org.          This list is accurate as of: 17 10:35 AM.  Always use your most recent med list.                   Brand Name Dispense Instructions for use    metoprolol 25 MG 24 hr tablet    TOPROL-XL    90 tablet    Take 1 tablet (25 mg) by mouth daily       omeprazole 20 MG CR capsule    priLOSEC    90 capsule    Take 1 capsule (20 mg) by mouth daily       oxyCODONE-acetaminophen 5-325 MG per tablet    PERCOCET    18 tablet    Take 1 tablet by mouth every 4 hours as needed for pain       traMADol 50 MG tablet    ULTRAM    30 tablet    Take 1-2 tablets by mouth at bedtime as needed

## 2017-02-16 ENCOUNTER — THERAPY VISIT (OUTPATIENT)
Dept: PHYSICAL THERAPY | Facility: CLINIC | Age: 37
End: 2017-02-16
Payer: COMMERCIAL

## 2017-02-16 DIAGNOSIS — S83.249A TEAR OF MEDIAL MENISCUS OF KNEE: ICD-10-CM

## 2017-02-16 PROCEDURE — 97010 HOT OR COLD PACKS THERAPY: CPT | Mod: GP | Performed by: PHYSICAL THERAPIST

## 2017-02-16 PROCEDURE — 97110 THERAPEUTIC EXERCISES: CPT | Mod: GP | Performed by: PHYSICAL THERAPIST

## 2017-02-16 NOTE — MR AVS SNAPSHOT
"              After Visit Summary   2/16/2017    Paco Ponce    MRN: [id number]           Patient Information     Date Of Birth          1980        Visit Information        Provider Department      2/16/2017 10:50 AM Jp Chen PT [facility] [place]        Today's Diagnoses     Tear of medial meniscus of knee           Follow-ups after your visit        Your next 10 appointments already scheduled     Feb 21, 2017 10:00 AM CST   Return Visit with Emiliano Gallardo PA-C   [facility] ([facility])    [address]  [address]  [place] [address]   [phone]              Who to contact     If you have questions or need follow up information about today's clinic visit or your schedule please contact [facility] [place] directly at [phone].  Normal or non-critical lab and imaging results will be communicated to you by MyChart, letter or phone within 4 business days after the clinic has received the results. If you do not hear from us within 7 days, please contact the clinic through MyChart or phone. If you have a critical or abnormal lab result, we will notify you by phone as soon as possible.  Submit refill requests through [employer] or call your pharmacy and they will forward the refill request to us. Please allow 3 business days for your refill to be completed.          Additional Information About Your Visit        MyChart Information     [facility] lets you send messages to your doctor, view your test results, renew your prescriptions, schedule appointments and more. To sign up, go to [email or web address] . Click on \"Log in\" on the left side of the screen, which will take you to the Welcome page. Then click on \"Sign up Now\" on the right side of the page.     You will be asked to enter the access code listed below, as well as some personal information. Please follow the directions to create your " username and password.     Your access code is: [email or web address]  Expires: 3/13/2017 11:19 AM     Your access code will  in 90 days. If you need help or a new code, please call your [facility] clinic or [phone].        Care EveryWhere ID     This is your Care EveryWhere ID. This could be used by other organizations to access your [facility] medical records  [id number]         Blood Pressure from Last 3 Encounters:   17 128/80   17 124/86   17 116/74    Weight from Last 3 Encounters:   17 117.9 kg (260 lb)   17 117.9 kg (260 lb)   17 117.9 kg (260 lb)              We Performed the Following     HOT OR COLD PACKS THERAPY     THERAPEUTIC EXERCISES        Primary Care Provider Office Phone # Fax #    Zhao Kirkland Jr., [phone]       [facility] [address] [facility] [address]        Thank you!     Thank you for choosing [facility] [place]  for your care. Our goal is always to provide you with excellent care. Hearing back from our patients is one way we can continue to improve our services. Please take a few minutes to complete the written survey that you may receive in the mail after your visit with us. Thank you!             Your Updated Medication List - Protect others around you: Learn how to safely use, store and throw away your medicines at www.disposemymeds.org.          This list is accurate as of: 17 11:22 AM.  Always use your most recent med list.                   Brand Name Dispense Instructions for use    metoprolol 25 MG 24 hr tablet    TOPROL-XL    90 tablet    Take 1 tablet (25 mg) by mouth daily       omeprazole 20 MG CR capsule    priLOSEC    90 capsule    Take 1 capsule (20 mg) by mouth daily       oxyCODONE-acetaminophen 5-325 MG per tablet    PERCOCET    18 tablet    Take 1 tablet by mouth every 4 hours as needed for pain       traMADol 50 MG tablet    ULTRAM    30 tablet    Take 1-2 tablets by  mouth at bedtime as needed

## 2017-02-21 ENCOUNTER — OFFICE VISIT (OUTPATIENT)
Dept: ORTHOPEDICS | Facility: CLINIC | Age: 37
End: 2017-02-21
Payer: COMMERCIAL

## 2017-02-21 ENCOUNTER — THERAPY VISIT (OUTPATIENT)
Dept: PHYSICAL THERAPY | Facility: CLINIC | Age: 37
End: 2017-02-21
Payer: COMMERCIAL

## 2017-02-21 DIAGNOSIS — Z47.89 ORTHOPEDIC AFTERCARE: Primary | ICD-10-CM

## 2017-02-21 DIAGNOSIS — S83.249A TEAR OF MEDIAL MENISCUS OF KNEE: ICD-10-CM

## 2017-02-21 PROCEDURE — 97110 THERAPEUTIC EXERCISES: CPT | Mod: GP | Performed by: PHYSICAL THERAPIST

## 2017-02-21 PROCEDURE — 99024 POSTOP FOLLOW-UP VISIT: CPT | Performed by: PHYSICIAN ASSISTANT

## 2017-02-21 PROCEDURE — 97010 HOT OR COLD PACKS THERAPY: CPT | Mod: GP | Performed by: PHYSICAL THERAPIST

## 2017-02-21 NOTE — PROGRESS NOTES
HISTORY OF PRESENT ILLNESS:    Paco Ponce is a 36 year old male who is seen in follow up for R knee scope, DOS 2/1/2017, Dr. Main.  Present symptoms: Pt reports improvements.  Still having swelling and soreness.  Does not feel he is able to RTW, has physical job, sched to return 2.23. .  Treatments include RICE.  Using nothing. for ambulation.  No new complaints.  Denies Chest pain, Calve pain, Fever, Chills.    PHYSICAL EXAM:  There were no vitals taken for this visit.  There is no height or weight on file to calculate BMI.   GENERAL APPEARANCE: healthy, alert and no distress   PSYCH:  mentation appears normal and affect normal/bright    MSK:  Left: Knee.  Ambulates: stiff and small limp out of chair first steps..  Incision clean and dry, healing.  NO incisional erythema.   No Ecchymosis.  No calve pain on palpation.  Edema moderate effusion at knee , none below knee.  CMS: bryanna incisional numbness, otherwise grossly intact.  AROM Flexion WNL.    IMAGING INTERPRETATION:  None today.     ASSESSMENT:  Paco Ponce is a 36 year old male S/P R knee scope, DOS 2/1/2017, Dr. Main.  Progressing.  Due to physical nature of job, RTW now may be very aggravating.    PLAN:  - Surgery discussed, images reviewed if applicable, and all questions were answered at this time.  - Care instructions given and verbally acknowledged.  - Medications: OTC PRN.  - Physical Therapy: As directed at discharge.  - AAT.  - RTW letter.    Return to clinic PRN    Emiliano Gallardo PA-C    Dept. Orthopedic Surgery  [facility]   2/21/2017

## 2017-02-21 NOTE — PATIENT INSTRUCTIONS
Continue with PT.    Ice and elevate for symptoms.    May call to change work letter.    [phone] and leave a message with name, date of birth and what day you would like to return, request a letter.

## 2017-02-21 NOTE — MR AVS SNAPSHOT
After Visit Summary   2/21/2017    Paco Ponce    MRN: [id number]           Patient Information     Date Of Birth          1980        Visit Information        Provider Department      2/21/2017 12:00 PM Jp Chen, PT [facility] [place]        Today's Diagnoses     Tear of medial meniscus of knee           Follow-ups after your visit        Your next 10 appointments already scheduled     Feb 24, 2017 10:50 AM CST   LADAN Extremity with Jp Chen PT   [facility] [place] ([place])    [address] [place] [address]   [phone]            Feb 27, 2017 10:00 AM CST   LADAN Extremity with Jp Chen PT   [facility] [place] ([facility] [place])    [address] [place] [address] [phone]            Mar 02, 2017 10:50 AM CST   LADAN Extremity with Jp Chen PT   [facility] [place] ([facility])    [address] [place] [address] [phone]              Who to contact     If you have questions or need follow up information about today's clinic visit or your schedule please contact [facility] [place] directly at [phone].  Normal or non-critical lab and imaging results will be communicated to you by AgRoboticshart, letter or phone within 4 business days after the clinic has received the results. If you do not hear from us within 7 days, please contact the clinic through AgRoboticshart or phone. If you have a critical or abnormal lab result, we will notify you by phone as soon as possible.  Submit refill requests through MyCarGossip or call your pharmacy and they will forward the refill request to us. Please allow 3 business days for your refill to be completed.          Additional Information About Your Visit        AgRoboticsharEli Nutrition Information     MyCarGossip lets you send messages to your doctor, view your test results, renew your prescriptions, schedule appointments and more. To sign  "up, go to [email or web address] . Click on \"Log in\" on the left side of the screen, which will take you to the Welcome page. Then click on \"Sign up Now\" on the right side of the page.     You will be asked to enter the access code listed below, as well as some personal information. Please follow the directions to create your username and password.     Your access code is: [email or web address]  Expires: 3/13/2017 11:19 AM     Your access code will  in 90 days. If you need help or a new code, please call your [place] clinic or [phone].        Care EveryWhere ID     This is your Care EveryWhere ID. This could be used by other organizations to access your [place] medical records  [id number]         Blood Pressure from Last 3 Encounters:   17 128/80   17 124/86   17 116/74    Weight from Last 3 Encounters:   17 117.9 kg (260 lb)   17 117.9 kg (260 lb)   17 117.9 kg (260 lb)              We Performed the Following     HOT OR COLD PACKS THERAPY     THERAPEUTIC EXERCISES        Primary Care Provider Office Phone # Fax #    Zhao Kirkland Jr., [phone]       [facility] [address] [address]        Thank you!     Thank you for choosing [place] [facility] [place]  for your care. Our goal is always to provide you with excellent care. Hearing back from our patients is one way we can continue to improve our services. Please take a few minutes to complete the written survey that you may receive in the mail after your visit with us. Thank you!             Your Updated Medication List - Protect others around you: Learn how to safely use, store and throw away your medicines at www.disposemymeds.org.          This list is accurate as of: 17 12:47 PM.  Always use your most recent med list.                   Brand Name Dispense Instructions for use    metoprolol 25 MG 24 hr tablet    TOPROL-XL    90 tablet    Take 1 tablet (25 " mg) by mouth daily       omeprazole 20 MG CR capsule    priLOSEC    90 capsule    Take 1 capsule (20 mg) by mouth daily       oxyCODONE-acetaminophen 5-325 MG per tablet    PERCOCET    18 tablet    Take 1 tablet by mouth every 4 hours as needed for pain       traMADol 50 MG tablet    ULTRAM    30 tablet    Take 1-2 tablets by mouth at bedtime as needed

## 2017-02-21 NOTE — MR AVS SNAPSHOT
"              After Visit Summary   2/21/2017    Paco Ponce    MRN: [id number]           Patient Information     Date Of Birth          1980        Visit Information        Provider Department      2/21/2017 10:00 AM Emiliano Gallardo PA-C [facility]        Care Instructions    Continue with PT.    Ice and elevate for symptoms.    May call to change work letter.    [phone] and leave a message with name, date of birth and what day you would like to return, request a letter.            Follow-ups after your visit        Follow-up notes from your care team     Return if symptoms worsen or fail to improve.      Your next 10 appointments already scheduled     Feb 21, 2017 12:00 PM CST   LADAN Extremity with Jp Chen, PT   [facility] [place] ([facility] [place])    [address] [place] [address] [phone]              Who to contact     If you have questions or need follow up information about today's clinic visit or your schedule please contact [facility] directly at [phone].  Normal or non-critical lab and imaging results will be communicated to you by Neighborlandhart, letter or phone within 4 business days after the clinic has received the results. If you do not hear from us within 7 days, please contact the clinic through Neighborlandhart or phone. If you have a critical or abnormal lab result, we will notify you by phone as soon as possible.  Submit refill requests through [employer] or call your pharmacy and they will forward the refill request to us. Please allow 3 business days for your refill to be completed.          Additional Information About Your Visit        Neighborlandhart Information     [employer] lets you send messages to your doctor, view your test results, renew your prescriptions, schedule appointments and more. To sign up, go to [email or web address] . Click on \"Log in\" on the left side of the screen, which will take you to " "the Welcome page. Then click on \"Sign up Now\" on the right side of the page.     You will be asked to enter the access code listed below, as well as some personal information. Please follow the directions to create your username and password.     Your access code is: [email or web address]  Expires: 3/13/2017 11:19 AM     Your access code will  in 90 days. If you need help or a new code, please call your [place] clinic or [phone].        Care EveryWhere ID     This is your Care EveryWhere ID. This could be used by other organizations to access your [place] medical records  [id number]         Blood Pressure from Last 3 Encounters:   17 128/80   17 124/86   17 116/74    Weight from Last 3 Encounters:   17 260 lb (117.9 kg)   17 260 lb (117.9 kg)   17 260 lb (117.9 kg)              Today, you had the following     No orders found for display       Primary Care Provider Office Phone # Fax #    Zhao Kirkland Jr., [phone]       [facility] [place] [address]  [place] [address]        Thank you!     Thank you for choosing [facility]  for your care. Our goal is always to provide you with excellent care. Hearing back from our patients is one way we can continue to improve our services. Please take a few minutes to complete the written survey that you may receive in the mail after your visit with us. Thank you!             Your Updated Medication List - Protect others around you: Learn how to safely use, store and throw away your medicines at www.disposemymeds.org.          This list is accurate as of: 17 10:20 AM.  Always use your most recent med list.                   Brand Name Dispense Instructions for use    metoprolol 25 MG 24 hr tablet    TOPROL-XL    90 tablet    Take 1 tablet (25 mg) by mouth daily       omeprazole 20 MG CR capsule    priLOSEC    90 capsule    Take 1 capsule (20 mg) by mouth daily       " oxyCODONE-acetaminophen 5-325 MG per tablet    PERCOCET    18 tablet    Take 1 tablet by mouth every 4 hours as needed for pain       traMADol 50 MG tablet    ULTRAM    30 tablet    Take 1-2 tablets by mouth at bedtime as needed

## 2017-02-24 ENCOUNTER — THERAPY VISIT (OUTPATIENT)
Dept: PHYSICAL THERAPY | Facility: CLINIC | Age: 37
End: 2017-02-24
Payer: COMMERCIAL

## 2017-02-24 DIAGNOSIS — S83.249A TEAR OF MEDIAL MENISCUS OF KNEE: ICD-10-CM

## 2017-02-24 PROCEDURE — 97010 HOT OR COLD PACKS THERAPY: CPT | Mod: GP | Performed by: PHYSICAL THERAPIST

## 2017-02-24 PROCEDURE — 97110 THERAPEUTIC EXERCISES: CPT | Mod: GP | Performed by: PHYSICAL THERAPIST

## 2017-02-24 NOTE — MR AVS SNAPSHOT
"              After Visit Summary   2/24/2017    Paco Ponce    MRN: [id number]           Patient Information     Date Of Birth          1980        Visit Information        Provider Department      2/24/2017 10:50 AM Jp Chen PT [facility] [place]        Today's Diagnoses     Tear of medial meniscus of knee           Follow-ups after your visit        Your next 10 appointments already scheduled     Feb 27, 2017 10:00 AM CST   LADAN Extremity with Jp Chen PT   [facility] [place] ([place])    [address] [place] [address] [phone]            Mar 02, 2017 10:50 AM CST   LADAN Extremity with Jp Chen PT   [facility] [place] ([place])    [address] [place] [address] [phone]              Who to contact     If you have questions or need follow up information about today's clinic visit or your schedule please contact [facility] [place] directly at [phone].  Normal or non-critical lab and imaging results will be communicated to you by MyChart, letter or phone within 4 business days after the clinic has received the results. If you do not hear from us within 7 days, please contact the clinic through Mister Spexhart or phone. If you have a critical or abnormal lab result, we will notify you by phone as soon as possible.  Submit refill requests through TMS or call your pharmacy and they will forward the refill request to us. Please allow 3 business days for your refill to be completed.          Additional Information About Your Visit        Mister Spexhart Information     TMS lets you send messages to your doctor, view your test results, renew your prescriptions, schedule appointments and more. To sign up, go to [email or web address] . Click on \"Log in\" on the left side of the screen, which will take you to the Welcome page. Then click on \"Sign up Now\" on the right side of the page. "     You will be asked to enter the access code listed below, as well as some personal information. Please follow the directions to create your username and password.     Your access code is: [email or web address]  Expires: 3/13/2017 11:19 AM     Your access code will  in 90 days. If you need help or a new code, please call your [place] clinic or [phone].        Care EveryWhere ID     This is your Care EveryWhere ID. This could be used by other organizations to access your [place] medical records  [id number]         Blood Pressure from Last 3 Encounters:   17 128/80   17 124/86   17 116/74    Weight from Last 3 Encounters:   17 117.9 kg (260 lb)   17 117.9 kg (260 lb)   17 117.9 kg (260 lb)              We Performed the Following     HOT OR COLD PACKS THERAPY     THERAPEUTIC EXERCISES        Primary Care Provider Office Phone # Fax #    Zhao Kirkland Jr., [phone]       [facility] [address] [address]        Thank you!     Thank you for choosing [place] [facility] [place]  for your care. Our goal is always to provide you with excellent care. Hearing back from our patients is one way we can continue to improve our services. Please take a few minutes to complete the written survey that you may receive in the mail after your visit with us. Thank you!             Your Updated Medication List - Protect others around you: Learn how to safely use, store and throw away your medicines at www.disposemymeds.org.          This list is accurate as of: 17 11:29 AM.  Always use your most recent med list.                   Brand Name Dispense Instructions for use    metoprolol 25 MG 24 hr tablet    TOPROL-XL    90 tablet    Take 1 tablet (25 mg) by mouth daily       omeprazole 20 MG CR capsule    priLOSEC    90 capsule    Take 1 capsule (20 mg) by mouth daily       oxyCODONE-acetaminophen 5-325 MG per tablet    PERCOCET    18  tablet    Take 1 tablet by mouth every 4 hours as needed for pain       traMADol 50 MG tablet    ULTRAM    30 tablet    Take 1-2 tablets by mouth at bedtime as needed

## 2017-02-27 ENCOUNTER — THERAPY VISIT (OUTPATIENT)
Dept: PHYSICAL THERAPY | Facility: CLINIC | Age: 37
End: 2017-02-27
Payer: COMMERCIAL

## 2017-02-27 ENCOUNTER — OFFICE VISIT (OUTPATIENT)
Dept: FAMILY MEDICINE | Facility: CLINIC | Age: 37
End: 2017-02-27
Payer: COMMERCIAL

## 2017-02-27 VITALS
TEMPERATURE: 96.4 F | WEIGHT: 260.38 LBS | HEIGHT: 72 IN | BODY MASS INDEX: 35.27 KG/M2 | SYSTOLIC BLOOD PRESSURE: 124 MMHG | DIASTOLIC BLOOD PRESSURE: 86 MMHG | HEART RATE: 93 BPM | OXYGEN SATURATION: 99 %

## 2017-02-27 DIAGNOSIS — G43.109 MIGRAINE WITH AURA AND WITHOUT STATUS MIGRAINOSUS, NOT INTRACTABLE: Primary | ICD-10-CM

## 2017-02-27 DIAGNOSIS — S83.249A TEAR OF MEDIAL MENISCUS OF KNEE: ICD-10-CM

## 2017-02-27 PROCEDURE — 97010 HOT OR COLD PACKS THERAPY: CPT | Mod: GP | Performed by: PHYSICAL THERAPIST

## 2017-02-27 PROCEDURE — 99213 OFFICE O/P EST LOW 20 MIN: CPT | Mod: 25 | Performed by: PHYSICIAN ASSISTANT

## 2017-02-27 PROCEDURE — 97110 THERAPEUTIC EXERCISES: CPT | Mod: GP | Performed by: PHYSICAL THERAPIST

## 2017-02-27 PROCEDURE — 96372 THER/PROPH/DIAG INJ SC/IM: CPT | Performed by: PHYSICIAN ASSISTANT

## 2017-02-27 RX ORDER — KETOROLAC TROMETHAMINE 30 MG/ML
30 INJECTION, SOLUTION INTRAMUSCULAR; INTRAVENOUS ONCE
Qty: 1 ML | Refills: 0 | COMMUNITY
Start: 2017-02-27 | End: 2017-02-27

## 2017-02-27 NOTE — MR AVS SNAPSHOT
"              After Visit Summary   2/27/2017    Paco Ponce    MRN: [id number]           Patient Information     Date Of Birth          1980        Visit Information        Provider Department      2/27/2017 10:00 AM Jp Chen PT [facility] [place]        Today's Diagnoses     Tear of medial meniscus of knee           Follow-ups after your visit        Your next 10 appointments already scheduled     Mar 02, 2017 10:50 AM CST   LADAN Extremity with Jp Chen PT   [place] [facility] [place] ([facility] [place])    [address] [facility] [address] [phone]              Who to contact     If you have questions or need follow up information about today's clinic visit or your schedule please contact [place] [facility] [place] directly at [phone].  Normal or non-critical lab and imaging results will be communicated to you by MyChart, letter or phone within 4 business days after the clinic has received the results. If you do not hear from us within 7 days, please contact the clinic through MyChart or phone. If you have a critical or abnormal lab result, we will notify you by phone as soon as possible.  Submit refill requests through Choosly or call your pharmacy and they will forward the refill request to us. Please allow 3 business days for your refill to be completed.          Additional Information About Your Visit        MyChart Information     Choosly lets you send messages to your doctor, view your test results, renew your prescriptions, schedule appointments and more. To sign up, go to [email or web address] . Click on \"Log in\" on the left side of the screen, which will take you to the Welcome page. Then click on \"Sign up Now\" on the right side of the page.     You will be asked to enter the access code listed below, as well as some personal information. Please follow the directions to create your username and password.     Your access code " is: [email or web address]  Expires: 3/13/2017 11:19 AM     Your access code will  in 90 days. If you need help or a new code, please call your [place] clinic or [phone].        Care EveryWhere ID     This is your Care EveryWhere ID. This could be used by other organizations to access your [place] medical records  [id number]         Blood Pressure from Last 3 Encounters:   17 128/80   17 124/86   17 116/74    Weight from Last 3 Encounters:   17 117.9 kg (260 lb)   17 117.9 kg (260 lb)   17 117.9 kg (260 lb)              We Performed the Following     HOT OR COLD PACKS THERAPY     THERAPEUTIC EXERCISES        Primary Care Provider Office Phone # Fax #    Zhao Kirkland Jr., [phone]       [facility] [address] [address]        Thank you!     Thank you for choosing [place] [facility]  for your care. Our goal is always to provide you with excellent care. Hearing back from our patients is one way we can continue to improve our services. Please take a few minutes to complete the written survey that you may receive in the mail after your visit with us. Thank you!             Your Updated Medication List - Protect others around you: Learn how to safely use, store and throw away your medicines at www.disposemymeds.org.          This list is accurate as of: 17 10:35 AM.  Always use your most recent med list.                   Brand Name Dispense Instructions for use    metoprolol 25 MG 24 hr tablet    TOPROL-XL    90 tablet    Take 1 tablet (25 mg) by mouth daily       omeprazole 20 MG CR capsule    priLOSEC    90 capsule    Take 1 capsule (20 mg) by mouth daily       oxyCODONE-acetaminophen 5-325 MG per tablet    PERCOCET    18 tablet    Take 1 tablet by mouth every 4 hours as needed for pain       traMADol 50 MG tablet    ULTRAM    30 tablet    Take 1-2 tablets by mouth at bedtime as needed

## 2017-02-27 NOTE — NURSING NOTE
Chief Complaint   Patient presents with     Headache     migraine ~3 days        Initial /86  Pulse 93  Temp 96.4  F (35.8  C) (Tympanic)  Ht 6' (1.829 m)  Wt 260 lb 6 oz (118.1 kg)  SpO2 99%  BMI 35.31 kg/m2 Estimated body mass index is 35.31 kg/(m^2) as calculated from the following:    Height as of this encounter: 6' (1.829 m).    Weight as of this encounter: 260 lb 6 oz (118.1 kg).  Medication Reconciliation: complete   Lori Cotton, CMA

## 2017-02-27 NOTE — PROGRESS NOTES
SUBJECTIVE:                                                    Paco Ponce is a 36 year old male who presents to clinic today for the following health issues:    Migraine  Patient presents to clinic today with chief complaint of migraine. Onset of symptoms was ~3 days ago. Describes noticeable neck and shoulder tension that often triggers migraines. States he has been having aura and visual disturbances, light, sound, smell sensitivity. He has PMH significant for migraines and has received various medications and injections for treatment of migraine. Migraines are typically well controlled and this is his first uncontrollable migraine episode in >6 months. Has tried OTC migraine relief medication, acetaminophen, ibuprofen, and even old pain prescription medications with no improvement of symptoms.     Problem list and histories reviewed & adjusted, as indicated.  Additional history: as documented    Patient Active Problem List   Diagnosis     CARDIOVASCULAR SCREENING; LDL GOAL LESS THAN 160     Hypertension goal BP (blood pressure) < 140/90     Morbid obesity due to excess calories (H)     Tear of medial meniscus of left knee, current, unspecified tear type, subsequent encounter     Tear of medial meniscus of knee     Past Surgical History   Procedure Laterality Date     Appendectomy       Hernia repair       Ent surgery       Gi surgery         Social History   Substance Use Topics     Smoking status: Never Smoker     Smokeless tobacco: Never Used     Alcohol use 0.0 oz/week     0 Standard drinks or equivalent per week      Comment: per wk 1-2     No family history on file.      Current Outpatient Prescriptions   Medication Sig Dispense Refill     oxyCODONE-acetaminophen (PERCOCET) 5-325 MG per tablet Take 1 tablet by mouth every 4 hours as needed for pain 18 tablet 0     omeprazole (PRILOSEC) 20 MG CR capsule Take 1 capsule (20 mg) by mouth daily 90 capsule 1     traMADol (ULTRAM) 50 MG tablet Take 1-2  tablets by mouth at bedtime as needed 30 tablet 0     metoprolol (TOPROL-XL) 25 MG 24 hr tablet Take 1 tablet (25 mg) by mouth daily 90 tablet 3     No Known Allergies    ROS:  Constitutional, HEENT, cardiovascular, pulmonary, GI, , musculoskeletal, neuro, skin, endocrine and psych systems are negative, except as otherwise noted.    This document serves as a record of the services and decisions personally performed and made by Angie Murillo PA-C. It was created on her behalf by Namrata Ruiz, a trained medical scribe. The creation of this document is based the provider's statements to the medical scribe.  Namrata Ruiz, February 27, 2017 11:50 AM    OBJECTIVE:                                                    /86  Pulse 93  Temp 96.4  F (35.8  C) (Tympanic)  Ht 6' (1.829 m)  Wt 260 lb 6 oz (118.1 kg)  SpO2 99%  BMI 35.31 kg/m2  Body mass index is 35.31 kg/(m^2).     GENERAL: healthy, alert and mild-moderate distress  NEURO: Normal strength and tone, mentation intact and speech normal  PSYCH: mentation appears normal, affect normal/bright    Ketorolac injection administered in clinic during visit today by MA.     Diagnostic Test Results:  none      ASSESSMENT/PLAN:                                                    Paco was seen today for headache.    Diagnoses and all orders for this visit:    Migraine with aura and without status migrainosus, not intractable  Patient stable and doing well after ketorolac injection. Advised patient to increase fluid intake and rest.   -     KETOROLAC TROMETHAMINE 15MG  -     INJECTION INTRAMUSCULAR OR SUB-Q        The information in this document, created by the medical scribe for me, accurately reflects the services I personally performed and the decisions made by me. I have reviewed and approved this document for accuracy prior to leaving the patient care area. Angie Murillo PA-C February 27, 2017 11:50 AM    Angie Murillo PA-C  [facility]

## 2017-02-27 NOTE — NURSING NOTE
The following medication was given:     MEDICATION: Ketorolac Tromethamine 60MG/2ML (30 mg/mL) (Toradol)  ROUTE: IM  SITE: [facility] - Gluteus  DOSE: 1 mL  LOT #: [id number]  :  GiveMeSport  EXPIRATION DATE:  06/2018  NDC: 25071-547-69  Lori Cotton CMA

## 2017-02-27 NOTE — MR AVS SNAPSHOT
"              After Visit Summary   2/27/2017    Paco Ponce    MRN: [id number]           Patient Information     Date Of Birth          1980        Visit Information        Provider Department      2/27/2017 11:40 AM Angie Murillo PA-C [facility]        Today's Diagnoses     Migraine with aura and without status migrainosus, not intractable    -  1       Follow-ups after your visit        Your next 10 appointments already scheduled     Mar 02, 2017 10:50 AM CST   LADAN Extremity with Jp Chen PT   [facility] [place] ([place])    [address] [place] [address] [phone]              Who to contact     If you have questions or need follow up information about today's clinic visit or your schedule please contact [facility] directly at [phone].  Normal or non-critical lab and imaging results will be communicated to you by MyChart, letter or phone within 4 business days after the clinic has received the results. If you do not hear from us within 7 days, please contact the clinic through MyChart or phone. If you have a critical or abnormal lab result, we will notify you by phone as soon as possible.  Submit refill requests through Cybits or call your pharmacy and they will forward the refill request to us. Please allow 3 business days for your refill to be completed.          Additional Information About Your Visit        MyChart Information     Cybits lets you send messages to your doctor, view your test results, renew your prescriptions, schedule appointments and more. To sign up, go to [email or web address] . Click on \"Log in\" on the left side of the screen, which will take you to the Welcome page. Then click on \"Sign up Now\" on the right side of the page.     You will be asked to enter the access code listed below, as well as some personal information. Please follow the directions to create your username and " password.     Your access code is: [email or web address]  Expires: 3/13/2017 11:19 AM     Your access code will  in 90 days. If you need help or a new code, please call your [facility] or [phone].        Care EveryWhere ID     This is your Care EveryWhere ID. This could be used by other organizations to access your [place] medical records  [id number]        Your Vitals Were     Pulse Temperature Height Pulse Oximetry BMI (Body Mass Index)       93 96.4  F (35.8  C) (Tympanic) 6' (1.829 m) 99% 35.31 kg/m2        Blood Pressure from Last 3 Encounters:   17 124/86   17 128/80   17 124/86    Weight from Last 3 Encounters:   17 260 lb 6 oz (118.1 kg)   17 260 lb (117.9 kg)   17 260 lb (117.9 kg)              We Performed the Following     INJECTION INTRAMUSCULAR OR SUB-Q     KETOROLAC TROMETHAMINE 15MG        Primary Care Provider Office Phone # Fax #    Zhao Kirkland Jr., [phone]       [facility] [address] [address]        Thank you!     Thank you for choosing [facility]  for your care. Our goal is always to provide you with excellent care. Hearing back from our patients is one way we can continue to improve our services. Please take a few minutes to complete the written survey that you may receive in the mail after your visit with us. Thank you!             Your Updated Medication List - Protect others around you: Learn how to safely use, store and throw away your medicines at www.disposemymeds.org.          This list is accurate as of: 17 12:49 PM.  Always use your most recent med list.                   Brand Name Dispense Instructions for use    metoprolol 25 MG 24 hr tablet    TOPROL-XL    90 tablet    Take 1 tablet (25 mg) by mouth daily       omeprazole 20 MG CR capsule    priLOSEC    90 capsule    Take 1 capsule (20 mg) by mouth daily       oxyCODONE-acetaminophen 5-325 MG per tablet     PERCOCET    18 tablet    Take 1 tablet by mouth every 4 hours as needed for pain       traMADol 50 MG tablet    ULTRAM    30 tablet    Take 1-2 tablets by mouth at bedtime as needed

## 2017-03-02 ENCOUNTER — THERAPY VISIT (OUTPATIENT)
Dept: PHYSICAL THERAPY | Facility: CLINIC | Age: 37
End: 2017-03-02
Payer: COMMERCIAL

## 2017-03-02 DIAGNOSIS — S83.249A TEAR OF MEDIAL MENISCUS OF KNEE: ICD-10-CM

## 2017-03-02 PROCEDURE — 97110 THERAPEUTIC EXERCISES: CPT | Mod: GP | Performed by: PHYSICAL THERAPIST

## 2017-03-02 PROCEDURE — 97010 HOT OR COLD PACKS THERAPY: CPT | Mod: GP | Performed by: PHYSICAL THERAPIST

## 2017-03-02 NOTE — MR AVS SNAPSHOT
"              After Visit Summary   3/2/2017    Paco Ponce    MRN: [id number]           Patient Information     Date Of Birth          1980        Visit Information        Provider Department      3/2/2017 10:50 AM Jp Chen PT [facility] [place]        Today's Diagnoses     Tear of medial meniscus of knee           Follow-ups after your visit        Who to contact     If you have questions or need follow up information about today's clinic visit or your schedule please contact [facility] [place] directly at [phone].  Normal or non-critical lab and imaging results will be communicated to you by Kapture Audiohart, letter or phone within 4 business days after the clinic has received the results. If you do not hear from us within 7 days, please contact the clinic through SuddenValuest or phone. If you have a critical or abnormal lab result, we will notify you by phone as soon as possible.  Submit refill requests through [facility] or call your pharmacy and they will forward the refill request to us. Please allow 3 business days for your refill to be completed.          Additional Information About Your Visit        MyChart Information     [facility] lets you send messages to your doctor, view your test results, renew your prescriptions, schedule appointments and more. To sign up, go to [email or web address] . Click on \"Log in\" on the left side of the screen, which will take you to the Welcome page. Then click on \"Sign up Now\" on the right side of the page.     You will be asked to enter the access code listed below, as well as some personal information. Please follow the directions to create your username and password.     Your access code is: [email or web address]  Expires: 3/13/2017 11:19 AM     Your access code will  in 90 days. If you need help or a new code, please call your [place] clinic or [phone].        Care EveryWhere ID     This is your Care EveryWhere ID. " This could be used by other organizations to access your [place] medical records  [id number]         Blood Pressure from Last 3 Encounters:   02/27/17 124/86   01/30/17 128/80   01/25/17 124/86    Weight from Last 3 Encounters:   02/27/17 118.1 kg (260 lb 6 oz)   01/30/17 117.9 kg (260 lb)   01/25/17 117.9 kg (260 lb)              We Performed the Following     HOT OR COLD PACKS THERAPY     THERAPEUTIC EXERCISES        Primary Care Provider Office Phone # Fax #    Zhao Kirkland Jr., [phone]       [facility] [address] [facility] [address]        Thank you!     Thank you for choosing [facility] [place]  for your care. Our goal is always to provide you with excellent care. Hearing back from our patients is one way we can continue to improve our services. Please take a few minutes to complete the written survey that you may receive in the mail after your visit with us. Thank you!             Your Updated Medication List - Protect others around you: Learn how to safely use, store and throw away your medicines at www.disposemymeds.org.          This list is accurate as of: 3/2/17 11:39 AM.  Always use your most recent med list.                   Brand Name Dispense Instructions for use    metoprolol 25 MG 24 hr tablet    TOPROL-XL    90 tablet    Take 1 tablet (25 mg) by mouth daily       omeprazole 20 MG CR capsule    priLOSEC    90 capsule    Take 1 capsule (20 mg) by mouth daily       oxyCODONE-acetaminophen 5-325 MG per tablet    PERCOCET    18 tablet    Take 1 tablet by mouth every 4 hours as needed for pain       traMADol 50 MG tablet    ULTRAM    30 tablet    Take 1-2 tablets by mouth at bedtime as needed

## 2017-03-06 ENCOUNTER — THERAPY VISIT (OUTPATIENT)
Dept: PHYSICAL THERAPY | Facility: CLINIC | Age: 37
End: 2017-03-06
Payer: COMMERCIAL

## 2017-03-06 DIAGNOSIS — S83.249A TEAR OF MEDIAL MENISCUS OF KNEE: ICD-10-CM

## 2017-03-06 PROBLEM — G43.109 MIGRAINE WITH AURA AND WITHOUT STATUS MIGRAINOSUS, NOT INTRACTABLE: Status: ACTIVE | Noted: 2017-03-06

## 2017-03-06 PROCEDURE — 97110 THERAPEUTIC EXERCISES: CPT | Mod: GP | Performed by: PHYSICAL THERAPIST

## 2017-03-06 PROCEDURE — 97010 HOT OR COLD PACKS THERAPY: CPT | Mod: GP | Performed by: PHYSICAL THERAPIST

## 2017-03-06 NOTE — MR AVS SNAPSHOT
"              After Visit Summary   3/6/2017    Paco Ponce    MRN: [id number]           Patient Information     Date Of Birth          1980        Visit Information        Provider Department      3/6/2017 11:20 AM Jp Chen PT [facility] [place]        Today's Diagnoses     Tear of medial meniscus of knee           Follow-ups after your visit        Your next 10 appointments already scheduled     Mar 09, 2017  9:30 AM CST   LADAN Extremity with Jp Chen PT   [facility] [place] ([place] [place])    [address] [facility] [id number] [phone]              Who to contact     If you have questions or need follow up information about today's clinic visit or your schedule please contact [facility] [place] directly at [phone].  Normal or non-critical lab and imaging results will be communicated to you by MyChart, letter or phone within 4 business days after the clinic has received the results. If you do not hear from us within 7 days, please contact the clinic through MyChart or phone. If you have a critical or abnormal lab result, we will notify you by phone as soon as possible.  Submit refill requests through [facility] or call your pharmacy and they will forward the refill request to us. Please allow 3 business days for your refill to be completed.          Additional Information About Your Visit        MyChart Information     [facility] lets you send messages to your doctor, view your test results, renew your prescriptions, schedule appointments and more. To sign up, go to [email or web address] . Click on \"Log in\" on the left side of the screen, which will take you to the Welcome page. Then click on \"Sign up Now\" on the right side of the page.     You will be asked to enter the access code listed below, as well as some personal information. Please follow the directions to create your username and password.     Your access code " is: [email or web address]  Expires: 3/13/2017 11:19 AM     Your access code will  in 90 days. If you need help or a new code, please call your [place] clinic or [phone].        Care EveryWhere ID     This is your Care EveryWhere ID. This could be used by other organizations to access your [place] medical records  [id number]         Blood Pressure from Last 3 Encounters:   17 124/86   17 128/80   17 124/86    Weight from Last 3 Encounters:   17 118.1 kg (260 lb 6 oz)   17 117.9 kg (260 lb)   17 117.9 kg (260 lb)              We Performed the Following     HOT OR COLD PACKS THERAPY     THERAPEUTIC EXERCISES        Primary Care Provider Office Phone # Fax #    Zhao Kirkland Jr., [phone]       [facility] [address] [facility] [address]        Thank you!     Thank you for choosing [facility] [place]  for your care. Our goal is always to provide you with excellent care. Hearing back from our patients is one way we can continue to improve our services. Please take a few minutes to complete the written survey that you may receive in the mail after your visit with us. Thank you!             Your Updated Medication List - Protect others around you: Learn how to safely use, store and throw away your medicines at www.disposemymeds.org.          This list is accurate as of: 3/6/17 11:53 AM.  Always use your most recent med list.                   Brand Name Dispense Instructions for use    metoprolol 25 MG 24 hr tablet    TOPROL-XL    90 tablet    Take 1 tablet (25 mg) by mouth daily       omeprazole 20 MG CR capsule    priLOSEC    90 capsule    Take 1 capsule (20 mg) by mouth daily       oxyCODONE-acetaminophen 5-325 MG per tablet    PERCOCET    18 tablet    Take 1 tablet by mouth every 4 hours as needed for pain       traMADol 50 MG tablet    ULTRAM    30 tablet    Take 1-2 tablets by mouth at bedtime as needed

## 2017-03-09 ENCOUNTER — THERAPY VISIT (OUTPATIENT)
Dept: PHYSICAL THERAPY | Facility: CLINIC | Age: 37
End: 2017-03-09
Payer: COMMERCIAL

## 2017-03-09 DIAGNOSIS — S83.249A TEAR OF MEDIAL MENISCUS OF KNEE: ICD-10-CM

## 2017-03-09 PROCEDURE — 97110 THERAPEUTIC EXERCISES: CPT | Mod: GP | Performed by: PHYSICAL THERAPIST

## 2017-03-09 PROCEDURE — 97010 HOT OR COLD PACKS THERAPY: CPT | Mod: GP | Performed by: PHYSICAL THERAPIST

## 2017-03-09 ASSESSMENT — ACTIVITIES OF DAILY LIVING (ADL)
SIT WITH YOUR KNEE BENT: ACTIVITY IS NOT DIFFICULT
SQUAT: ACTIVITY IS SOMEWHAT DIFFICULT
AS_A_RESULT_OF_YOUR_KNEE_INJURY,_HOW_WOULD_YOU_RATE_YOUR_CURRENT_LEVEL_OF_DAILY_ACTIVITY?: NEARLY NORMAL
RAW_SCORE: 56
SWELLING: THE SYMPTOM AFFECTS MY ACTIVITY SLIGHTLY
PAIN: THE SYMPTOM AFFECTS MY ACTIVITY SLIGHTLY
GIVING WAY, BUCKLING OR SHIFTING OF KNEE: I DO NOT HAVE THE SYMPTOM
KNEE_ACTIVITY_OF_DAILY_LIVING_SCORE: 80
HOW_WOULD_YOU_RATE_THE_OVERALL_FUNCTION_OF_YOUR_KNEE_DURING_YOUR_USUAL_DAILY_ACTIVITIES?: NEARLY NORMAL
WALK: ACTIVITY IS NOT DIFFICULT
RISE FROM A CHAIR: ACTIVITY IS NOT DIFFICULT
STAND: ACTIVITY IS NOT DIFFICULT
KNEE_ACTIVITY_OF_DAILY_LIVING_SUM: 56
GO DOWN STAIRS: ACTIVITY IS MINIMALLY DIFFICULT
WEAKNESS: THE SYMPTOM AFFECTS MY ACTIVITY SLIGHTLY
KNEEL ON THE FRONT OF YOUR KNEE: ACTIVITY IS FAIRLY DIFFICULT
LIMPING: I DO NOT HAVE THE SYMPTOM
HOW_WOULD_YOU_RATE_THE_CURRENT_FUNCTION_OF_YOUR_KNEE_DURING_YOUR_USUAL_DAILY_ACTIVITIES_ON_A_SCALE_FROM_0_TO_100_WITH_100_BEING_YOUR_LEVEL_OF_KNEE_FUNCTION_PRIOR_TO_YOUR_INJURY_AND_0_BEING_THE_INABILITY_TO_PERFORM_ANY_OF_YOUR_USUAL_DAILY_ACTIVITIES?: 80
GO UP STAIRS: ACTIVITY IS NOT DIFFICULT
STIFFNESS: THE SYMPTOM AFFECTS MY ACTIVITY SLIGHTLY

## 2017-03-09 NOTE — PROGRESS NOTES
Subjective:    HPI       Knee Activity of Daily Living Score: 80            Objective:    System    Physical Exam    General     ROS    Assessment/Plan:      DISCHARGE REPORT    Progress reporting period is from February 2017 to March 2017.       SUBJECTIVE  Subjective: Patient reports that he has no pain with any ADL's; states leah the is ready to return to work   Current pain level is 0/10  .     Previous pain level was  7/10  .   Changes in function:  Yes (See Goal flowsheet attached for changes in current functional level)  Adverse reaction to treatment or activity: None    OBJECTIVE  Changes noted in objective findings:  Yes  Objective: 8/0/151; Knee MMT = 5/5 for flex & ext     ASSESSMENT/PLAN  Updated problem list and treatment plan: Diagnosis 1:  S/p partial meniscectomy  Pain -  hot/cold therapy, self management, education and home program  Decreased ROM/flexibility - therapeutic exercise, therapeutic activity and home program  Decreased strength - therapeutic exercise, therapeutic activities and home program  Edema - cold therapy and self management/home program  Impaired gait - gait training and home program  Impaired muscle performance - neuro re-education and home program  Decreased function - therapeutic activities and home program  STG/LTGs have been met or progress has been made towards goals:  Yes (See Goal flow sheet completed today.)  Assessment of Progress: The patient's condition is improving.  Patient is meeting short term goals and is progressing towards long term goals.  Self Management Plans:  Patient has been instructed in a home treatment program.  Patient is independent in a home treatment program.  Patient  has been instructed in self management of symptoms.  I have re-evaluated this patient and find that the nature, scope, duration and intensity of the therapy is appropriate for the medical condition of the patient.  Paco continues to require the following intervention to meet STG and  LTG's:  PT intervention is no longer required to meet STG/LTG.    Recommendations:  This patient is ready to be discharged from therapy and continue their home treatment program.    Please refer to the daily flowsheet for treatment today, total treatment time and time spent performing 1:1 timed codes.

## 2017-03-09 NOTE — MR AVS SNAPSHOT
"              After Visit Summary   3/9/2017    Paco Ponce    MRN: [id number]           Patient Information     Date Of Birth          1980        Visit Information        Provider Department      3/9/2017 9:30 AM Jp Chen PT [facility] [place]        Today's Diagnoses     Tear of medial meniscus of knee           Follow-ups after your visit        Who to contact     If you have questions or need follow up information about today's clinic visit or your schedule please contact [facility] [place] directly at [phone].  Normal or non-critical lab and imaging results will be communicated to you by PropertyGuruhart, letter or phone within 4 business days after the clinic has received the results. If you do not hear from us within 7 days, please contact the clinic through Feeligot or phone. If you have a critical or abnormal lab result, we will notify you by phone as soon as possible.  Submit refill requests through Adbrain or call your pharmacy and they will forward the refill request to us. Please allow 3 business days for your refill to be completed.          Additional Information About Your Visit        MyChart Information     Adbrain lets you send messages to your doctor, view your test results, renew your prescriptions, schedule appointments and more. To sign up, go to [email or web address] . Click on \"Log in\" on the left side of the screen, which will take you to the Welcome page. Then click on \"Sign up Now\" on the right side of the page.     You will be asked to enter the access code listed below, as well as some personal information. Please follow the directions to create your username and password.     Your access code is: [email or web address]  Expires: 3/13/2017 11:19 AM     Your access code will  in 90 days. If you need help or a new code, please call your [place] clinic or [phone].        Care EveryWhere ID     This is your Care EveryWhere ID. This " could be used by other organizations to access your [place] medical records  [id number]         Blood Pressure from Last 3 Encounters:   02/27/17 124/86   01/30/17 128/80   01/25/17 124/86    Weight from Last 3 Encounters:   02/27/17 118.1 kg (260 lb 6 oz)   01/30/17 117.9 kg (260 lb)   01/25/17 117.9 kg (260 lb)              We Performed the Following     HOT OR COLD PACKS THERAPY     THERAPEUTIC EXERCISES        Primary Care Provider Office Phone # Fax #    Zhao Kirkland Jr., [phone]       [facility] [address] [facility] [address]        Thank you!     Thank you for choosing [facility] [facility] [place]  for your care. Our goal is always to provide you with excellent care. Hearing back from our patients is one way we can continue to improve our services. Please take a few minutes to complete the written survey that you may receive in the mail after your visit with us. Thank you!             Your Updated Medication List - Protect others around you: Learn how to safely use, store and throw away your medicines at www.disposemymeds.org.          This list is accurate as of: 3/9/17 10:03 AM.  Always use your most recent med list.                   Brand Name Dispense Instructions for use    metoprolol 25 MG 24 hr tablet    TOPROL-XL    90 tablet    Take 1 tablet (25 mg) by mouth daily       omeprazole 20 MG CR capsule    priLOSEC    90 capsule    Take 1 capsule (20 mg) by mouth daily       oxyCODONE-acetaminophen 5-325 MG per tablet    PERCOCET    18 tablet    Take 1 tablet by mouth every 4 hours as needed for pain       traMADol 50 MG tablet    ULTRAM    30 tablet    Take 1-2 tablets by mouth at bedtime as needed

## 2017-03-15 ENCOUNTER — TELEPHONE (OUTPATIENT)
Dept: ORTHOPEDICS | Facility: CLINIC | Age: 37
End: 2017-03-15

## 2017-03-15 NOTE — LETTER
[facility]  [address]  [place] [address]  [phone]        March 15, 2017    RE:Paco Ponce  [address] [address]    1980      To whom it may concern:    Paco Ponce is under my professional care following left knee surgery   He  may return to work on  3/15/17 with the following: No restrictions.        Sincerely,        Jorge Post ATC, MAEd on behalf of  Yordan Main MD

## 2017-03-15 NOTE — LETTER
[facility]  [address]  [place] [address]  [phone]        March 15, 2017    RE:Paco Ponce  [address] [facility] [address]    1980      To whom it may concern:    Paco Ponce is under my professional care following right knee surgery   He  may return to work on  3/15/17 with the following: No restrictions.        Sincerely,      Yordan Main MD

## 2017-03-15 NOTE — TELEPHONE ENCOUNTER
The letter had the wrong laterality on it. I stated right when it was actually the left. The letter was corrected and re-printed and given to the patient.      Jorge Post, ATC

## 2017-03-15 NOTE — TELEPHONE ENCOUNTER
Patient left voice mail stating that his work is requiring an updated letter to return to work today.     Returned call to patient, he states that he is progressing well and feels like he can return to work without an restrictions. Asked that letter be faxed to [phone] Attn: Lc.     Letter completed and faxed    Wisam Ocampo ATC

## 2017-03-15 NOTE — TELEPHONE ENCOUNTER
Paco called the  and indicated that the letter was not received at the fax number provided. The  verified the fax number and I refaxed the letter. He also requested one to  and that was left at the  as well.    Jorge Post ATC

## 2017-07-20 DIAGNOSIS — K21.9 GASTROESOPHAGEAL REFLUX DISEASE WITHOUT ESOPHAGITIS: ICD-10-CM

## 2017-07-20 NOTE — TELEPHONE ENCOUNTER
omeprazole (PRILOSEC) 20 MG CR capsule    Last Written Prescription Date: 1-  Last Fill Quantity: 90,  # refills: 1   Last Office Visit with [facility]ERCY, TORIT[facility] or [facility] prescribing provider: 2-

## 2017-07-20 NOTE — TELEPHONE ENCOUNTER
Refill per RN Protocol.     Sonia Palacio, RN  [facility]

## 2017-09-25 PROBLEM — N20.0 KIDNEY STONE: Status: ACTIVE | Noted: 2017-09-25

## 2017-09-26 ENCOUNTER — OFFICE VISIT (OUTPATIENT)
Dept: FAMILY MEDICINE | Facility: CLINIC | Age: 37
End: 2017-09-26
Payer: COMMERCIAL

## 2017-09-26 VITALS
HEART RATE: 95 BPM | HEIGHT: 72 IN | BODY MASS INDEX: 33.89 KG/M2 | SYSTOLIC BLOOD PRESSURE: 142 MMHG | OXYGEN SATURATION: 97 % | TEMPERATURE: 98.1 F | WEIGHT: 250.2 LBS | DIASTOLIC BLOOD PRESSURE: 107 MMHG

## 2017-09-26 DIAGNOSIS — Z23 NEED FOR PROPHYLACTIC VACCINATION AND INOCULATION AGAINST INFLUENZA: ICD-10-CM

## 2017-09-26 DIAGNOSIS — I10 HYPERTENSION GOAL BP (BLOOD PRESSURE) < 140/90: ICD-10-CM

## 2017-09-26 DIAGNOSIS — N20.0 KIDNEY STONE: Primary | ICD-10-CM

## 2017-09-26 DIAGNOSIS — G43.109 MIGRAINE WITH AURA AND WITHOUT STATUS MIGRAINOSUS, NOT INTRACTABLE: ICD-10-CM

## 2017-09-26 LAB
ALBUMIN UR-MCNC: NEGATIVE MG/DL
APPEARANCE UR: CLEAR
BASOPHILS # BLD AUTO: 0 10E9/L (ref 0–0.2)
BASOPHILS NFR BLD AUTO: 0.3 %
BILIRUB UR QL STRIP: NEGATIVE
COLOR UR AUTO: YELLOW
DIFFERENTIAL METHOD BLD: NORMAL
EOSINOPHIL # BLD AUTO: 0.1 10E9/L (ref 0–0.7)
EOSINOPHIL NFR BLD AUTO: 1.8 %
ERYTHROCYTE [DISTWIDTH] IN BLOOD BY AUTOMATED COUNT: 12.6 % (ref 10–15)
GLUCOSE UR STRIP-MCNC: NEGATIVE MG/DL
HCT VFR BLD AUTO: 46.3 % (ref 40–53)
HGB BLD-MCNC: 16.4 G/DL (ref 13.3–17.7)
HGB UR QL STRIP: NEGATIVE
KETONES UR STRIP-MCNC: NEGATIVE MG/DL
LEUKOCYTE ESTERASE UR QL STRIP: NEGATIVE
LYMPHOCYTES # BLD AUTO: 2.1 10E9/L (ref 0.8–5.3)
LYMPHOCYTES NFR BLD AUTO: 28.8 %
MCH RBC QN AUTO: 30.1 PG (ref 26.5–33)
MCHC RBC AUTO-ENTMCNC: 35.4 G/DL (ref 31.5–36.5)
MCV RBC AUTO: 85 FL (ref 78–100)
MONOCYTES # BLD AUTO: 0.5 10E9/L (ref 0–1.3)
MONOCYTES NFR BLD AUTO: 7.6 %
NEUTROPHILS # BLD AUTO: 4.4 10E9/L (ref 1.6–8.3)
NEUTROPHILS NFR BLD AUTO: 61.5 %
NITRATE UR QL: NEGATIVE
PH UR STRIP: 7 PH (ref 5–7)
PLATELET # BLD AUTO: 264 10E9/L (ref 150–450)
RBC # BLD AUTO: 5.44 10E12/L (ref 4.4–5.9)
SOURCE: NORMAL
SP GR UR STRIP: 1.01 (ref 1–1.03)
UROBILINOGEN UR STRIP-ACNC: 0.2 EU/DL (ref 0.2–1)
WBC # BLD AUTO: 7.1 10E9/L (ref 4–11)

## 2017-09-26 PROCEDURE — 99214 OFFICE O/P EST MOD 30 MIN: CPT | Mod: 25 | Performed by: FAMILY MEDICINE

## 2017-09-26 PROCEDURE — 85025 COMPLETE CBC W/AUTO DIFF WBC: CPT | Performed by: FAMILY MEDICINE

## 2017-09-26 PROCEDURE — 36415 COLL VENOUS BLD VENIPUNCTURE: CPT | Performed by: FAMILY MEDICINE

## 2017-09-26 PROCEDURE — 81003 URINALYSIS AUTO W/O SCOPE: CPT | Performed by: FAMILY MEDICINE

## 2017-09-26 PROCEDURE — 90471 IMMUNIZATION ADMIN: CPT | Performed by: FAMILY MEDICINE

## 2017-09-26 PROCEDURE — 90686 IIV4 VACC NO PRSV 0.5 ML IM: CPT | Performed by: FAMILY MEDICINE

## 2017-09-26 PROCEDURE — 80048 BASIC METABOLIC PNL TOTAL CA: CPT | Performed by: FAMILY MEDICINE

## 2017-09-26 RX ORDER — OXYCODONE AND ACETAMINOPHEN 5; 325 MG/1; MG/1
1 TABLET ORAL EVERY 4 HOURS PRN
Qty: 18 TABLET | Refills: 0 | Status: SHIPPED | OUTPATIENT
Start: 2017-09-26 | End: 2017-11-30

## 2017-09-26 NOTE — PROGRESS NOTES
Injectable Influenza Immunization Documentation    1.  Is the person to be vaccinated sick today?   No    2. Does the person to be vaccinated have an allergy to a component   of the vaccine?   No    3. Has the person to be vaccinated ever had a serious reaction   to influenza vaccine in the past?   No    4. Has the person to be vaccinated ever had Guillain-Barré syndrome?   No    Form completed by johnnie Azevedo CMA

## 2017-09-26 NOTE — PROGRESS NOTES
SUBJECTIVE:   Paco Ponce is a 37 year old male who presents to clinic today for the following health issues: follow up           Hospital Follow-up Visit:    Hospital/Nursing Home/ Rehab Facility: [facility]   Date of Admission: 9/22/2017   Date of Discharge: 9/22/2017  Reason(s) for Admission: kidney stone            Problems taking medications regularly:  None       Medication changes since discharge: None       Problems adhering to non-medication therapy:  None    Summary of hospitalization:  CareEverywhere information obtained and reviewed  Diagnostic Tests/Treatments reviewed.  Follow up needed: none  Other Healthcare Providers Involved in Patient s Care:         None  Update since discharge: stable.     Post Discharge Medication Reconciliation: discharge medications reconciled, continue medications without change.  Plan of care communicated with patient     Coding guidelines for this visit:  Type of Medical   Decision Making Face-to-Face Visit       within 7 Days of discharge Face-to-Face Visit        within 14 days of discharge   Moderate Complexity [id number]   High Complexity 22082 [id number]          Paco was seen in the [place] emergency department for a kidney stone about 3 days ago. CT scan demonstrated a 4 mm stone but had passed down the ureter and was present and clearly visible in the bladder. Despite this, Paco's pain has persisted. He continues to note left-sided flank pain that radiates down to his penis and is fairly persistent. He denies gross hematuria.        Problem list and histories reviewed & adjusted, as indicated.  Additional history: as documented    BP Readings from Last 3 Encounters:   09/26/17 (!) 142/107   02/27/17 124/86   01/30/17 128/80    Wt Readings from Last 3 Encounters:   09/26/17 250 lb 3.2 oz (113.5 kg)   02/27/17 260 lb 6 oz (118.1 kg)   01/30/17 260 lb (117.9 kg)                  Labs reviewed in EPIC      Reviewed and updated as needed this visit by  clinical staff       Reviewed and updated as needed this visit by Provider         ROS:  Constitutional, HEENT, cardiovascular, pulmonary, gi and gu systems are negative, except as otherwise noted.      OBJECTIVE:   BP (!) 142/107  Pulse 95  Temp 98.1  F (36.7  C) (Oral)  Ht 6' (1.829 m)  Wt 250 lb 3.2 oz (113.5 kg)  SpO2 97%  BMI 33.93 kg/m2  Body mass index is 33.93 kg/(m^2).  GENERAL: healthy, alert and no distress  NECK: no adenopathy, no asymmetry, masses, or scars and thyroid normal to palpation  BACK: left CVA tenderness, but no paralumbar tenderness    Diagnostic Test Results:  Results for orders placed or performed in visit on 09/26/17 (from the past 24 hour(s))   CBC with platelets differential   Result Value Ref Range    WBC 7.1 4.0 - 11.0 10e9/L    RBC Count 5.44 4.4 - 5.9 10e12/L    Hemoglobin 16.4 13.3 - 17.7 g/dL    Hematocrit 46.3 40.0 - 53.0 %    MCV 85 78 - 100 fl    MCH 30.1 26.5 - 33.0 pg    MCHC 35.4 31.5 - 36.5 g/dL    RDW 12.6 10.0 - 15.0 %    Platelet Count 264 150 - 450 10e9/L    Diff Method Automated Method     % Neutrophils 61.5 %    % Lymphocytes 28.8 %    % Monocytes 7.6 %    % Eosinophils 1.8 %    % Basophils 0.3 %    Absolute Neutrophil 4.4 1.6 - 8.3 10e9/L    Absolute Lymphocytes 2.1 0.8 - 5.3 10e9/L    Absolute Monocytes 0.5 0.0 - 1.3 10e9/L    Absolute Eosinophils 0.1 0.0 - 0.7 10e9/L    Absolute Basophils 0.0 0.0 - 0.2 10e9/L   *UA reflex to Microscopic   Result Value Ref Range    Color Urine Yellow     Appearance Urine Clear     Glucose Urine Negative NEG^Negative mg/dL    Bilirubin Urine Negative NEG^Negative    Ketones Urine Negative NEG^Negative mg/dL    Specific Gravity Urine 1.015 1.003 - 1.035    Blood Urine Negative NEG^Negative    pH Urine 7.0 5.0 - 7.0 pH    Protein Albumin Urine Negative NEG^Negative mg/dL    Urobilinogen Urine 0.2 0.2 - 1.0 EU/dL    Nitrite Urine Negative NEG^Negative    Leukocyte Esterase Urine Negative NEG^Negative    Source Midstream Urine         ASSESSMENT/PLAN:             1. Kidney stone  Would suspect the pain will diminish quite rapidly now that there is radiographic evidence of the kidney stone present in the bladder. I recommended that he strain his urine for a future stone analysis so that we can give recommendations on how to minimize risk of future kidney stones. He has had a kidney stone about 10 years ago but does not recall analysis being done on that kidney stone either. His hematuria that was present in the emergency department has resolved and his CBC is normal. Will ensure his renal function remains normal with his metabolic panel today. I gave him a refill on his oxycodone and advised him that he will likely be okay to return to work in 48-72 hours.  - oxyCODONE-acetaminophen (PERCOCET) 5-325 MG per tablet; Take 1 tablet by mouth every 4 hours as needed for pain  Dispense: 18 tablet; Refill: 0  - CBC with platelets differential  - Basic metabolic panel  (Ca, Cl, CO2, Creat, Gluc, K, Na, BUN)  - *UA reflex to Microscopic    2. Migraine with aura and without status migrainosus, not intractable  Action plan is updated.  - MIGRAINE ACTION PLAN    3. Hypertension goal BP (blood pressure) < 140/90  Blood pressure is elevated today despite 25 mg of metoprolol that he still takes daily. I suspect his pain may be contributing to some of this. I have requested a follow-up appointment with Paco in 4 weeks to recheck his blood pressure.    4. Need for prophylactic vaccination and inoculation against influenza  Influenza immunization is updated today.  - FLU VAC, SPLIT VIRUS IM > 3 YO (QUADRIVALENT) [05583]  - Vaccine Administration, Initial [52379]    See Patient Instructions    Zhao Kirkland Jr, MD  [facility]

## 2017-09-26 NOTE — LETTER
September 26, 2017      Paco Ponce  [address] [address] [address]        To Whom It May Concern:    Paco Ponce was seen in our clinic. He may return to work without restrictions on or about 9/29/17.      Sincerely,        Zhao Kirkland Jr, MD

## 2017-09-26 NOTE — NURSING NOTE
Chief Complaint   Patient presents with     ER F/U       Initial BP (!) 124/110 (BP Location: Right arm, Patient Position: Sitting, Cuff Size: Adult Large)  Pulse 95  Temp 98.1  F (36.7  C) (Oral)  Ht 6' (1.829 m)  Wt 250 lb 3.2 oz (113.5 kg)  SpO2 97%  BMI 33.93 kg/m2 Estimated body mass index is 33.93 kg/(m^2) as calculated from the following:    Height as of this encounter: 6' (1.829 m).    Weight as of this encounter: 250 lb 3.2 oz (113.5 kg).  Medication Reconciliation: complete   Angel King /Medical Assistant.

## 2017-09-26 NOTE — LETTER
[facility]                      [phone]   September 27, 2017    Paco Ponce  [address] [address] [address]      Dear Paco,    Here is a summary of your recent test results:    -Kidney function is normal (Cr, GFR), Sodium is normal, Potassium is normal, Calcium is normal, Glucose is normal (diabetes screening test).   -Normal red blood cell (hgb) levels, normal white blood cell count and normal platelet levels.   -Urine is normal.     Your test results are enclosed.      Please contact me if you have any questions.             Thank you very much for trusting [facility].     Healthy regards,  Zhao Kirkland Jr, MD          Results for orders placed or performed in visit on 09/26/17   CBC with platelets differential   Result Value Ref Range    WBC 7.1 4.0 - 11.0 10e9/L    RBC Count 5.44 4.4 - 5.9 10e12/L    Hemoglobin 16.4 13.3 - 17.7 g/dL    Hematocrit 46.3 40.0 - 53.0 %    MCV 85 78 - 100 fl    MCH 30.1 26.5 - 33.0 pg    MCHC 35.4 31.5 - 36.5 g/dL    RDW 12.6 10.0 - 15.0 %    Platelet Count 264 150 - 450 10e9/L    Diff Method Automated Method     % Neutrophils 61.5 %    % Lymphocytes 28.8 %    % Monocytes 7.6 %    % Eosinophils 1.8 %    % Basophils 0.3 %    Absolute Neutrophil 4.4 1.6 - 8.3 10e9/L    Absolute Lymphocytes 2.1 0.8 - 5.3 10e9/L    Absolute Monocytes 0.5 0.0 - 1.3 10e9/L    Absolute Eosinophils 0.1 0.0 - 0.7 10e9/L    Absolute Basophils 0.0 0.0 - 0.2 10e9/L   Basic metabolic panel  (Ca, Cl, CO2, Creat, Gluc, K, Na, BUN)   Result Value Ref Range    Sodium 137 133 - 144 mmol/L    Potassium 4.3 3.4 - 5.3 mmol/L    Chloride 103 94 - 109 mmol/L    Carbon Dioxide 26 20 - 32 mmol/L    Anion Gap 8 3 - 14 mmol/L    Glucose 99 70 - 99 mg/dL    Urea Nitrogen 13 7 - 30 mg/dL    Creatinine 1.13 0.66 - 1.25 mg/dL    GFR Estimate 72 >60 mL/min/1.7m2    GFR Estimate If Black 87 >60 mL/min/1.7m2    Calcium 9.6 8.5 - 10.1 mg/dL   *UA reflex to  Microscopic   Result Value Ref Range    Color Urine Yellow     Appearance Urine Clear     Glucose Urine Negative NEG^Negative mg/dL    Bilirubin Urine Negative NEG^Negative    Ketones Urine Negative NEG^Negative mg/dL    Specific Gravity Urine 1.015 1.003 - 1.035    Blood Urine Negative NEG^Negative    pH Urine 7.0 5.0 - 7.0 pH    Protein Albumin Urine Negative NEG^Negative mg/dL    Urobilinogen Urine 0.2 0.2 - 1.0 EU/dL    Nitrite Urine Negative NEG^Negative    Leukocyte Esterase Urine Negative NEG^Negative    Source Midstream Urine

## 2017-09-26 NOTE — MR AVS SNAPSHOT
"              After Visit Summary   9/26/2017    Paco Ponce    MRN: [id number]           Patient Information     Date Of Birth          1980        Visit Information        Provider Department      9/26/2017 11:40 AM Zhao Kirkland Jr., MD [facility] [place]        Today's Diagnoses     Kidney stone    -  1    Migraine with aura and without status migrainosus, not intractable        Hypertension goal BP (blood pressure) < 140/90          Care Instructions       * KIDNEY STONE (w/ Colic)    The sharp cramping pain and nausea/vomiting that you have is due to a small stone which has formed in the kidney and is now passing down a narrow tube (ureter) on its way to your bladder. Once it reaches your bladder, the pain will stop. The stone may pass in your urine stream in one piece. [The size may be 1/16\" to 1/4\" (1-6mm)]. Or, the stone may also break up into anish fragments which you may not even notice.  Once you have had a kidney stone there is a risk for recurrence in the future.  HOME CARE:    Drink lots of fluid (at least 8-10 glasses of water a day).    Most stones will pass on their own, but may take from a few hours to a few days.    Each time you urinate, do so in a jar. Pour the urine from the jar through the strainer and into the toilet. Continue doing this until 24 hours after your pain stops. By then, if there was a kidney stone, it should pass from your bladder. Some stones dissolve into sand-like particles and pass right through the strainer. In that case, you won't ever see a stone.    Save any stone that you find in the strainer and bring it to your doctor for analysis. It may be possible to prevent certain types of stones from forming. Therefore, it is important to know what kind of stone you have.    Try to stay as active as possible since this will help the stone pass. Do not stay in bed unless your pain prevents you from getting up. You may notice a red, pink or brown color to " your urine. This is normal while passing a kidney stone.  FOLLOW UP with your doctor or return to this facility if the pain lasts more than 48 hours.  GET PROMPT MEDICAL ATTENTION if any of the following occur:    Pain that is not controlled by the medicine given    Repeated vomiting or unable to keep down fluids    Weakness, dizziness or fainting    Fever over 101  F (38.3  C)    Passage of solid red or brown urine (can't see through it) or urine with lots of blood clots    Unable to pass urine for 8 hours and increasing bladder pressure    [id number] [facility], [address] [place], [place] [id number]. All rights reserved. This information is not intended as a substitute for professional medical care. Always follow your healthcare professional's instructions.    Understanding Kidney Stones  Your kidneys are bean-shaped organs. They help filter extra salts, waste, and water from your body. You need to drink enough water every day to help flush the extra salts into your urine.     What are kidney stones?  Kidney stones are made up of chemical crystals that separate out from urine. These crystals clump together to make stones. They form in the calyx of the kidney. They may stay in the kidney or move into the urinary tract.   Why kidney stones form  Kidneys form stones for many reasons. If you don t drink enough water, for instance, you won t have enough urine to dilute chemicals. Then the chemicals may form crystals, which can develop into stones. Here are some reasons why kidney stones form:    Fluid loss (dehydration). This can concentrate urine, causing stones to form.    Certain foods. Some foods contain large amounts of the chemicals that sometimes crystallize into stones. Eating foods that contain a lot of meat or salt can lead to more kidney stones.    Kidney infections. These infections foster stones by slowing urine flow or changing the acid balance of your urine.    Family history. If family  members have had kidney stones, you re more likely to have them, too.    A lack of certain substances in your urine. Some substances can help protect you from forming stones. If you don t have enough of these in your urine, stone formation can increase.  Where stones form  Stones begin in the cup-shaped part of the kidney (calyx). Some stay in the calyx and grow. Others move into the kidney, pelvis, or into the ureter. There they can lodge, block the flow of urine, and cause pain.  Symptoms  Many stones cause sudden and severe pain and bloody urine. Others cause nausea or frequent, burning urination. Symptoms often depend on your stone s size and location. Fever may indicate a serious infection. Call your healthcare provider right away if you develop a fever.  Date Last Reviewed: 1/1/2017 2000-2017 [employer]. [address] [place] [address]. All rights reserved. This information is not intended as a substitute for professional medical care. Always follow your healthcare professional's instructions.        Preventing Kidney Stones  If you ve had a kidney stone, you may worry that you ll have another. Removing or passing your stone doesn t prevent future stones. But with your healthcare provider s help, you can reduce your risk of forming new stones. Follow up with your healthcare provider to help find new stones. You may need follow-up every 3 months to a year for a lifetime.    Drink lots of water  Staying well-hydrated is the best way to reduce your risk of future stones. Drink 8 12-ounce glasses of water daily. Have 2 with each meal and 2 between meals. Try keeping a pitcher of water nearby during the day and at night.  Take medicines if needed  Medicines, including vitamins and minerals, may be prescribed for certain types of stones. You may want to write your doses and medicine times on a calendar. Some medicines decrease stone-forming chemicals in your blood. Others help prevent  those chemicals from crystallizing in urine. Still others help keep a normal acid balance in your urine.  Follow your prescribed diet  Your healthcare provider will tell you which foods contain the chemicals you should avoid. Your healthcare provider may also suggest talking to a dietitian. He or she can help you plan meals you ll enjoy. These meals won t put you at risk for future stones. You may be told to limit certain foods, depending on which type of stones you ve had. You should limit the amount of salt in your food to about 2 grams a day. This will help prevent most types of kidney stones. Make sure you get an adequate amount of calcium in your diet.  For calcium oxalate stones: Limit animal protein, such as meat, eggs, and fish. Limit grapefruit juice and alcohol. Limit high-oxalate foods (such as cola, tea, chocolate, spinach, rhubarb, wheat bran, and peanuts).  For uric acid stones: Limit high-purine foods, such as mushrooms, peas, beans, anchovies, meat, poultry, shellfish, and organ meats. These foods increase uric acid production.  For cystine stones: Limit high-methionine foods (fish is the most common, but eggs and meats, also). These foods increase production of cystine.  Date Last Reviewed: 2/1/2017 2000-2017 [employer]. [address] [place] [address], [place] [address]. All rights reserved. This information is not intended as a substitute for professional medical care. Always follow your healthcare professional's instructions.                Follow-ups after your visit        Follow-up notes from your care team     Return in about 4 weeks (around 10/24/2017) for BP Recheck.      Who to contact     If you have questions or need follow up information about today's clinic visit or your schedule please [facility] directly at [phone].  Normal or non-critical lab and imaging results will be communicated to you by MyChart, letter or phone within 4 business days after  "the clinic has received the results. If you do not hear from us within 7 days, please contact the clinic through [employer] or phone. If you have a critical or abnormal lab result, we will notify you by phone as soon as possible.  Submit refill requests through [employer] or call your pharmacy and they will forward the refill request to us. Please allow 3 business days for your refill to be completed.          Additional Information About Your Visit        [employer] Information     [employer] lets you send messages to your doctor, view your test results, renew your prescriptions, schedule appointments and more. To sign up, go to [email or web address] . Click on \"Log in\" on the left side of the screen, which will take you to the Welcome page. Then click on \"Sign up Now\" on the right side of the page.     You will be asked to enter the access code listed below, as well as some personal information. Please follow the directions to create your username and password.     Your access code is: [email or web address]  Expires: 2017 12:11 PM     Your access code will  in 90 days. If you need help or a new code, please call your [facility] clinic or [phone].        Care EveryWhere ID     This is your Care EveryWhere ID. This could be used by other organizations to access your [facility] medical records  [id number]        Your Vitals Were     Pulse Temperature Height Pulse Oximetry BMI (Body Mass Index)       95 98.1  F (36.7  C) (Oral) 6' (1.829 m) 97% 33.93 kg/m2        Blood Pressure from Last 3 Encounters:   17 (!) 142/107   17 124/86   17 128/80    Weight from Last 3 Encounters:   17 250 lb 3.2 oz (113.5 kg)   17 260 lb 6 oz (118.1 kg)   17 260 lb (117.9 kg)              We Performed the Following     *UA reflex to Microscopic     Basic metabolic panel  (Ca, Cl, CO2, Creat, Gluc, K, Na, BUN)     CBC with platelets differential     MIGRAINE ACTION PLAN          Where to get your medicines    "   Some of these will need a paper prescription and others can be bought over the counter.  Ask your nurse if you have questions.     Bring a paper prescription for each of these medications     oxyCODONE-acetaminophen 5-325 MG per tablet          Primary Care Provider Office Phone # Fax #    Zhao Kirkland Jr., [phone] [address] [facility] [facility] [address]        Equal Access to Services     [facility] Center[facility] : Hadii aad ku hadasho Soomaali, waaxda luqadaha, qaybta kaalmada adeegyada, waxay idiin hayaan adeeg kharash la'aan ah. So [facility] [phone].    ATENCIÓN: Si habla español, tiene a nicholson disposición servicios gratuitos de asistencia lingüística. Llame al [phone].    We comply with applicable federal civil rights laws and [place] laws. We do not discriminate on the basis of race, color, national origin, age, disability sex, sexual orientation or gender identity.            Thank you!     Thank you for choosing [facility]  for your care. Our goal is always to provide you with excellent care. Hearing back from our patients is one way we can continue to improve our services. Please take a few minutes to complete the written survey that you may receive in the mail after your visit with us. Thank you!             Your Updated Medication List - Protect others around you: Learn how to safely use, store and throw away your medicines at www.disposemymeds.org.          This list is accurate as of: 9/26/17 12:11 PM.  Always use your most recent med list.                   Brand Name Dispense Instructions for use Diagnosis    metoprolol 25 MG 24 hr tablet    TOPROL-XL    90 tablet    Take 1 tablet (25 mg) by mouth daily    Hypertension goal BP (blood pressure) < 140/90       omeprazole 20 MG CR capsule    priLOSEC    90 capsule    Take 1 capsule (20 mg) by mouth daily    Gastroesophageal reflux disease without esophagitis       oxyCODONE-acetaminophen 5-325 MG per tablet    PERCOCET     18 tablet    Take 1 tablet by mouth every 4 hours as needed for pain    Kidney stone

## 2017-09-26 NOTE — LETTER
My Migraine Action Plan      Date: 9/26/2017     My Name: Paco Ponce   YOB: 1980  My Pharmacy: [id number] [place], [place] [facility] [address] [address]       My (Preventative) Control Medicine: ***        My Rescue Medicine: ***   My Doctor: Zhao Kirkland Jr.     My Clinic: [facility] [place]  [address]  [place] [address] [phone]        GREEN ZONE = Good Control    My headache plan is working.   I can do what I need to do.           I WILL:     ? Keep managing my triggers.  ? Write in my migraine diary each time I have a headache.  ? Keep taking my preventive (controller) medicine daily.  ? Take my relief and rescue medicine as needed.             YELLOW ZONE = Not Enough Control    My headache plan isn t always working.   My headaches keep me from doing   some of the things I need to do.       I WILL:     ? Set goals to control my triggers and act on them.  ? Write in my migraine diary each time I have a headache and review it for                      patterns or new triggers.  ? Keep taking my preventive (controller) medicine daily.  ? Take my relief and rescue medicine as needed.  ? Call my doctor or clinic at if I stay in the Yellow Zone.             RED ZONE = Poor or No Control    My headache plan has  failed. I can t do anything  when I have one. My  medicines aren t working.           I WILL:   ? Set goals to control my triggers and act on them.  ? Write in my migraine diary each time I have a headache and review it for                      patterns or new triggers.  ? Keep taking my preventive (controller) medicine daily.  ? Take my relief and rescue medicine as needed.  ? Call my doctor or clinic or go to urgent care or an ER if I m having the worst                  headache of my life.  ? Call my doctor or clinic or go to urgent care or an ER if my medicine doesn t work.  ? Let my doctor or clinic know within 2 weeks if I have gone to an urgent care or              emergency department.          Provider specific instructions:  ***

## 2017-09-26 NOTE — PATIENT INSTRUCTIONS
"   * KIDNEY STONE (w/ Colic)    The sharp cramping pain and nausea/vomiting that you have is due to a small stone which has formed in the kidney and is now passing down a narrow tube (ureter) on its way to your bladder. Once it reaches your bladder, the pain will stop. The stone may pass in your urine stream in one piece. [The size may be 1/16\" to 1/4\" (1-6mm)]. Or, the stone may also break up into anish fragments which you may not even notice.  Once you have had a kidney stone there is a risk for recurrence in the future.  HOME CARE:    Drink lots of fluid (at least 8-10 glasses of water a day).    Most stones will pass on their own, but may take from a few hours to a few days.    Each time you urinate, do so in a jar. Pour the urine from the jar through the strainer and into the toilet. Continue doing this until 24 hours after your pain stops. By then, if there was a kidney stone, it should pass from your bladder. Some stones dissolve into sand-like particles and pass right through the strainer. In that case, you won't ever see a stone.    Save any stone that you find in the strainer and bring it to your doctor for analysis. It may be possible to prevent certain types of stones from forming. Therefore, it is important to know what kind of stone you have.    Try to stay as active as possible since this will help the stone pass. Do not stay in bed unless your pain prevents you from getting up. You may notice a red, pink or brown color to your urine. This is normal while passing a kidney stone.  FOLLOW UP with your doctor or return to this facility if the pain lasts more than 48 hours.  GET PROMPT MEDICAL ATTENTION if any of the following occur:    Pain that is not controlled by the medicine given    Repeated vomiting or unable to keep down fluids    Weakness, dizziness or fainting    Fever over 101  F (38.3  C)    Passage of solid red or brown urine (can't see through it) or urine with lots of blood clots    Unable " to pass urine for 8 hours and increasing bladder pressure    4427-0527 Beata Jackson, [address] [facility], [place] [address]. All rights reserved. This information is not intended as a substitute for professional medical care. Always follow your healthcare professional's instructions.    Understanding Kidney Stones  Your kidneys are bean-shaped organs. They help filter extra salts, waste, and water from your body. You need to drink enough water every day to help flush the extra salts into your urine.     What are kidney stones?  Kidney stones are made up of chemical crystals that separate out from urine. These crystals clump together to make stones. They form in the calyx of the kidney. They may stay in the kidney or move into the urinary tract.   Why kidney stones form  Kidneys form stones for many reasons. If you don t drink enough water, for instance, you won t have enough urine to dilute chemicals. Then the chemicals may form crystals, which can develop into stones. Here are some reasons why kidney stones form:    Fluid loss (dehydration). This can concentrate urine, causing stones to form.    Certain foods. Some foods contain large amounts of the chemicals that sometimes crystallize into stones. Eating foods that contain a lot of meat or salt can lead to more kidney stones.    Kidney infections. These infections foster stones by slowing urine flow or changing the acid balance of your urine.    Family history. If family members have had kidney stones, you re more likely to have them, too.    A lack of certain substances in your urine. Some substances can help protect you from forming stones. If you don t have enough of these in your urine, stone formation can increase.  Where stones form  Stones begin in the cup-shaped part of the kidney (calyx). Some stay in the calyx and grow. Others move into the kidney, pelvis, or into the ureter. There they can lodge, block the flow of urine, and cause  pain.  Symptoms  Many stones cause sudden and severe pain and bloody urine. Others cause nausea or frequent, burning urination. Symptoms often depend on your stone s size and location. Fever may indicate a serious infection. Call your healthcare provider right away if you develop a fever.  Date Last Reviewed: 1/1/2017 2000-2017 [employer]. [address] [place] [address], [place] [address]. All rights reserved. This information is not intended as a substitute for professional medical care. Always follow your healthcare professional's instructions.        Preventing Kidney Stones  If you ve had a kidney stone, you may worry that you ll have another. Removing or passing your stone doesn t prevent future stones. But with your healthcare provider s help, you can reduce your risk of forming new stones. Follow up with your healthcare provider to help find new stones. You may need follow-up every 3 months to a year for a lifetime.    Drink lots of water  Staying well-hydrated is the best way to reduce your risk of future stones. Drink 8 12-ounce glasses of water daily. Have 2 with each meal and 2 between meals. Try keeping a pitcher of water nearby during the day and at night.  Take medicines if needed  Medicines, including vitamins and minerals, may be prescribed for certain types of stones. You may want to write your doses and medicine times on a calendar. Some medicines decrease stone-forming chemicals in your blood. Others help prevent those chemicals from crystallizing in urine. Still others help keep a normal acid balance in your urine.  Follow your prescribed diet  Your healthcare provider will tell you which foods contain the chemicals you should avoid. Your healthcare provider may also suggest talking to a dietitian. He or she can help you plan meals you ll enjoy. These meals won t put you at risk for future stones. You may be told to limit certain foods, depending on which type of stones you ve had. You  should limit the amount of salt in your food to about 2 grams a day. This will help prevent most types of kidney stones. Make sure you get an adequate amount of calcium in your diet.  For calcium oxalate stones: Limit animal protein, such as meat, eggs, and fish. Limit grapefruit juice and alcohol. Limit high-oxalate foods (such as cola, tea, chocolate, spinach, rhubarb, wheat bran, and peanuts).  For uric acid stones: Limit high-purine foods, such as mushrooms, peas, beans, anchovies, meat, poultry, shellfish, and organ meats. These foods increase uric acid production.  For cystine stones: Limit high-methionine foods (fish is the most common, but eggs and meats, also). These foods increase production of cystine.  Date Last Reviewed: 2/1/2017 2000-2017 [employer]. [address] [place] [address], [place] [address]. All rights reserved. This information is not intended as a substitute for professional medical care. Always follow your healthcare professional's instructions.

## 2017-09-27 LAB
ANION GAP SERPL CALCULATED.3IONS-SCNC: 8 MMOL/L (ref 3–14)
BUN SERPL-MCNC: 13 MG/DL (ref 7–30)
CALCIUM SERPL-MCNC: 9.6 MG/DL (ref 8.5–10.1)
CHLORIDE SERPL-SCNC: 103 MMOL/L (ref 94–109)
CO2 SERPL-SCNC: 26 MMOL/L (ref 20–32)
CREAT SERPL-MCNC: 1.13 MG/DL (ref 0.66–1.25)
GFR SERPL CREATININE-BSD FRML MDRD: 72 ML/MIN/1.7M2
GLUCOSE SERPL-MCNC: 99 MG/DL (ref 70–99)
POTASSIUM SERPL-SCNC: 4.3 MMOL/L (ref 3.4–5.3)
SODIUM SERPL-SCNC: 137 MMOL/L (ref 133–144)

## 2017-09-27 NOTE — PROGRESS NOTES
Please send the following letter:    Mr. Ponce,    -Kidney function is normal (Cr, GFR), Sodium is normal, Potassium is normal, Calcium is normal, Glucose is normal (diabetes screening test).   -Normal red blood cell (hgb) levels, normal white blood cell count and normal platelet levels.  -Urine is normal.    If you have further questions about the interpretation of your labs, [email or web address] is a good website to check out for further information.      Please contact the clinic if you have additional questions.  Thank you.    Sincerely,    Zhao Kirkland MD

## 2017-11-30 ENCOUNTER — OFFICE VISIT (OUTPATIENT)
Dept: FAMILY MEDICINE | Facility: CLINIC | Age: 37
End: 2017-11-30
Payer: COMMERCIAL

## 2017-11-30 VITALS
OXYGEN SATURATION: 99 % | HEIGHT: 72 IN | TEMPERATURE: 98.4 F | BODY MASS INDEX: 33.72 KG/M2 | HEART RATE: 89 BPM | DIASTOLIC BLOOD PRESSURE: 92 MMHG | SYSTOLIC BLOOD PRESSURE: 128 MMHG | WEIGHT: 249 LBS

## 2017-11-30 DIAGNOSIS — M54.50 ACUTE RIGHT-SIDED LOW BACK PAIN WITHOUT SCIATICA: Primary | ICD-10-CM

## 2017-11-30 LAB
ALBUMIN UR-MCNC: NEGATIVE MG/DL
APPEARANCE UR: CLEAR
BILIRUB UR QL STRIP: NEGATIVE
COLOR UR AUTO: YELLOW
GLUCOSE UR STRIP-MCNC: NEGATIVE MG/DL
HGB UR QL STRIP: NEGATIVE
KETONES UR STRIP-MCNC: NEGATIVE MG/DL
LEUKOCYTE ESTERASE UR QL STRIP: NEGATIVE
NITRATE UR QL: NEGATIVE
PH UR STRIP: 5.5 PH (ref 5–7)
SOURCE: NORMAL
SP GR UR STRIP: 1.02 (ref 1–1.03)
UROBILINOGEN UR STRIP-ACNC: 0.2 EU/DL (ref 0.2–1)

## 2017-11-30 PROCEDURE — 99213 OFFICE O/P EST LOW 20 MIN: CPT | Performed by: FAMILY MEDICINE

## 2017-11-30 PROCEDURE — 81003 URINALYSIS AUTO W/O SCOPE: CPT | Performed by: FAMILY MEDICINE

## 2017-11-30 RX ORDER — CYCLOBENZAPRINE HCL 10 MG
5-10 TABLET ORAL 3 TIMES DAILY PRN
Qty: 30 TABLET | Refills: 0 | Status: SHIPPED | OUTPATIENT
Start: 2017-11-30 | End: 2018-03-26

## 2017-11-30 NOTE — PROGRESS NOTES
SUBJECTIVE:   Paco Ponce is a 37 year old male who presents to clinic today for the following health issues:      Back Pain       Duration: x3 Days     Pain on right Lower thoracic/lower back area         Specific cause: none - recently had kidney stones left side     Description:   Location of pain: low back right and upper back right  Character of pain: sharp, burning and cramping  Pain radiation:none  New numbness or weakness in legs, not attributed to pain:  no     Intensity: Currently 3/10, At its worst 10/10    History:   Pain interferes with job: YES  History of back problems: no prior back problems  Any previous MRI or X-rays: None  Sees a specialist for back pain:  No - seen chiropractic in the past at a younger age   Therapies tried without relief: cold, heat and massage, ibuprofen without relief.     Alleviating factors:   Improved by: rest      Precipitating factors:  Worsened by: Lifting, Bending and Standing    Functional and Psychosocial Screen ([place] STarT Back):      Not performed today      Accompanying Signs & Symptoms:  Risk of Fracture:  None  Risk of Cauda Equina:  None  Risk of Infection:  None  Risk of Cancer:  None  Risk of Ankylosing Spondylitis:  Onset at age <35, male, AND morning back stiffness. no       Right sided low back pain, feels like spasming. Recent history of kidney stones and he wants to make sure this isn't a recurrence. He past previous kidney stone that was on the left. Denies any urinary symptoms -- no hematuria, dysuria, frequency, or urgency. Pain wraps around his right side and radiates down his back. No radiation into his groin. The pain is constant but will have episodes where pain dramatically increases, particularly when he is moving. He works in [employer], however, hasn't done anything different prior to this pain starting. No injury or trauma. He has no chest pain or pressure, dyspnea, shortness of breath, nausea, or vomiting.    Problem list and histories  reviewed & adjusted, as indicated.  Additional history: as documented    Patient Active Problem List   Diagnosis     CARDIOVASCULAR SCREENING; LDL GOAL LESS THAN 160     Hypertension goal BP (blood pressure) < 140/90     Morbid obesity due to excess calories (H)     Tear of medial meniscus of left knee, current, unspecified tear type, subsequent encounter     Migraine with aura and without status migrainosus, not intractable     Kidney stone     Past Surgical History:   Procedure Laterality Date     APPENDECTOMY       ENT SURGERY       GI SURGERY       HERNIA REPAIR         Social History   Substance Use Topics     Smoking status: Never Smoker     Smokeless tobacco: Never Used     Alcohol use 0.0 oz/week     0 Standard drinks or equivalent per week      Comment: per wk 1-2     History reviewed. No pertinent family history.          Reviewed and updated as needed this visit by clinical staffTobacco  Allergies  Meds  Problems  Med Hx  Surg Hx  Fam Hx  Soc Hx        Reviewed and updated as needed this visit by Provider  Allergies  Meds  Problems         ROS:  Constitutional, HEENT, cardiovascular, pulmonary, gi and gu systems are negative, except as otherwise noted.      OBJECTIVE:   BP (!) 128/92  Pulse 89  Temp 98.4  F (36.9  C) (Oral)  Ht 6' (1.829 m)  Wt 249 lb (112.9 kg)  SpO2 99%  BMI 33.77 kg/m2  Body mass index is 33.77 kg/(m^2).  GENERAL: healthy, alert and no distress  MS: mild tenderness to percussion in right CVA but also mild tenderness to palpation along right lumbar paraspinal musculature. BLE strength 5/5. No sensory deficit. Negative SLR. Back with full range of motion but pain noted with twisting movements.    Diagnostic Test Results:  Urinalysis - unremarkable    ASSESSMENT/PLAN:   1. Acute right-sided low back pain without sciatica: pain and symptoms consistent with muscle strain. No exam findings concerning for disc herniation or fracture. UA negative for blood so kidney stone is  unlikely. Will treat muscle spasms with Flexril. Continue applying heat, taking acetaminophen or ibuprofen as needed. If symptoms not improving over the course of the next week, consider referral to physical therapy or imaging study.  - *UA reflex to Microscopic and Culture ([facility] [facility] (except [place] and [place])  - cyclobenzaprine (FLEXERIL) 10 MG tablet; Take 0.5-1 tablets (5-10 mg) by mouth 3 times daily as needed for muscle spasms  Dispense: 30 tablet; Refill: 0         Douglas Longo,   [facility] [place]

## 2017-11-30 NOTE — NURSING NOTE
Chief Complaint   Patient presents with     Back Pain       Initial BP (!) 128/92  Pulse 89  Temp 98.4  F (36.9  C) (Oral)  Ht 6' (1.829 m)  Wt 249 lb (112.9 kg)  SpO2 99%  BMI 33.77 kg/m2 Estimated body mass index is 33.77 kg/(m^2) as calculated from the following:    Height as of this encounter: 6' (1.829 m).    Weight as of this encounter: 249 lb (112.9 kg).  Medication Reconciliation: complete   Ana Eastman Certified Medical Assistant

## 2017-11-30 NOTE — MR AVS SNAPSHOT
"              After Visit Summary   2017    Paco Ponce    MRN: [id number]           Patient Information     Date Of Birth          1980        Visit Information        Provider Department      2017 8:20 AM Douglas Longo, DO [facility] [place]        Today's Diagnoses     Acute right-sided low back pain without sciatica    -  1       Follow-ups after your visit        Follow-up notes from your care team     Return in about 2 weeks (around 2017), or if symptoms worsen or fail to improve.      Who to contact     If you have questions or need follow up information about today's clinic visit or your schedule please contact [facility] [place] directly at [phone].  Normal or non-critical lab and imaging results will be communicated to you by MyChart, letter or phone within 4 business days after the clinic has received the results. If you do not hear from us within 7 days, please contact the clinic through [email or web address] or phone. If you have a critical or abnormal lab result, we will notify you by phone as soon as possible.  Submit refill requests through [employer] or call your pharmacy and they will forward the refill request to us. Please allow 3 business days for your refill to be completed.          Additional Information About Your Visit        MyChart Information     [employer] lets you send messages to your doctor, view your test results, renew your prescriptions, schedule appointments and more. To sign up, go to [email or web address] . Click on \"Log in\" on the left side of the screen, which will take you to the Welcome page. Then click on \"Sign up Now\" on the right side of the page.     You will be asked to enter the access code listed below, as well as some personal information. Please follow the directions to create your username and password.     Your access code is: [email or web address]  Expires: 2017 11:11 AM     Your access code will  in 90 days. If you need help or " a new code, please call your [place] clinic or [phone].        Care EveryWhere ID     This is your Care EveryWhere ID. This could be used by other organizations to access your [place] medical records  [id number]        Your Vitals Were     Pulse Temperature Height Pulse Oximetry BMI (Body Mass Index)       89 98.4  F (36.9  C) (Oral) 6' (1.829 m) 99% 33.77 kg/m2        Blood Pressure from Last 3 Encounters:   11/30/17 (!) 128/92   09/26/17 (!) 142/107   02/27/17 124/86    Weight from Last 3 Encounters:   11/30/17 249 lb (112.9 kg)   09/26/17 250 lb 3.2 oz (113.5 kg)   02/27/17 260 lb 6 oz (118.1 kg)              We Performed the Following     *UA reflex to Microscopic and Culture ([place] and [facility] (except [place] and [place])          Today's Medication Changes          These changes are accurate as of: 11/30/17  8:53 AM.  If you have any questions, ask your nurse or doctor.               Start taking these medicines.        Dose/Directions    cyclobenzaprine 10 MG tablet   Commonly known as:  FLEXERIL   Used for:  Acute right-sided low back pain without sciatica   Started by:  Douglas Longo DO        Dose:  5-10 mg   Take 0.5-1 tablets (5-10 mg) by mouth 3 times daily as needed for muscle spasms   Quantity:  30 tablet   Refills:  0            Where to get your medicines      These medications were sent to [facility] [id number] [facility] - [place] - [address] [facility] [address]  [address] [facility] [address], [place] [address]     Phone:  [phone]     cyclobenzaprine 10 MG tablet                Primary Care Provider Office Phone # Fax #    Zhao Kirkland Jr., [phone] [address]  [place] [address]        Equal Access to Services     COLLEEN CASTELLANOS : Anoop Rodriguez, wamary luqadaha, qaybta kaalmatwin armstrong, steve spencer. [facility] [phone].    ATENCIÓN: Si habla español, tiene a nicholson disposición servicios gratuitos de asistencia  lingüística. Jose al [phone].    We comply with applicable federal civil rights laws and [place] laws. We do not discriminate on the basis of race, color, national origin, age, disability, sex, sexual orientation, or gender identity.            Thank you!     Thank you for choosing [facility]  for your care. Our goal is always to provide you with excellent care. Hearing back from our patients is one way we can continue to improve our services. Please take a few minutes to complete the written survey that you may receive in the mail after your visit with us. Thank you!             Your Updated Medication List - Protect others around you: Learn how to safely use, store and throw away your medicines at www.disposemymeds.org.          This list is accurate as of: 11/30/17  8:53 AM.  Always use your most recent med list.                   Brand Name Dispense Instructions for use Diagnosis    cyclobenzaprine 10 MG tablet    FLEXERIL    30 tablet    Take 0.5-1 tablets (5-10 mg) by mouth 3 times daily as needed for muscle spasms    Acute right-sided low back pain without sciatica       metoprolol 25 MG 24 hr tablet    TOPROL-XL    90 tablet    Take 1 tablet (25 mg) by mouth daily    Hypertension goal BP (blood pressure) < 140/90       omeprazole 20 MG CR capsule    priLOSEC    90 capsule    Take 1 capsule (20 mg) by mouth daily    Gastroesophageal reflux disease without esophagitis

## 2017-12-23 ENCOUNTER — HOSPITAL ENCOUNTER (EMERGENCY)
Facility: CLINIC | Age: 37
Discharge: HOME | End: 2017-12-24
Attending: EMERGENCY MEDICINE | Admitting: EMERGENCY MEDICINE
Payer: COMMERCIAL

## 2017-12-23 DIAGNOSIS — T78.2XXA ANAPHYLAXIS, INITIAL ENCOUNTER: ICD-10-CM

## 2017-12-23 DIAGNOSIS — J01.90 ACUTE SINUSITIS, RECURRENCE NOT SPECIFIED, UNSPECIFIED LOCATION: ICD-10-CM

## 2017-12-23 DIAGNOSIS — L50.9 URTICARIA: ICD-10-CM

## 2017-12-23 PROCEDURE — 96372 THER/PROPH/DIAG INJ SC/IM: CPT

## 2017-12-23 PROCEDURE — 99285 EMERGENCY DEPT VISIT HI MDM: CPT | Mod: 25

## 2017-12-23 RX ORDER — IPRATROPIUM BROMIDE AND ALBUTEROL SULFATE 2.5; .5 MG/3ML; MG/3ML
SOLUTION RESPIRATORY (INHALATION)
Status: COMPLETED
Start: 2017-12-23 | End: 2017-12-24

## 2017-12-23 RX ORDER — ALBUTEROL SULFATE 0.83 MG/ML
2.5 SOLUTION RESPIRATORY (INHALATION)
Status: DISCONTINUED | OUTPATIENT
Start: 2017-12-23 | End: 2017-12-24 | Stop reason: HOSPADM

## 2017-12-23 RX ORDER — METHYLPREDNISOLONE SODIUM SUCCINATE 125 MG/2ML
125 INJECTION, POWDER, LYOPHILIZED, FOR SOLUTION INTRAMUSCULAR; INTRAVENOUS ONCE
Status: COMPLETED | OUTPATIENT
Start: 2017-12-23 | End: 2017-12-24

## 2017-12-23 RX ORDER — LIDOCAINE 40 MG/G
CREAM TOPICAL
Status: DISCONTINUED | OUTPATIENT
Start: 2017-12-23 | End: 2017-12-24 | Stop reason: HOSPADM

## 2017-12-23 RX ORDER — DIPHENHYDRAMINE HYDROCHLORIDE 50 MG/ML
50 INJECTION INTRAMUSCULAR; INTRAVENOUS ONCE
Status: COMPLETED | OUTPATIENT
Start: 2017-12-23 | End: 2017-12-24

## 2017-12-23 NOTE — ED AVS SNAPSHOT
[facility]    [address] [place] [address]    Phone:  [phone]    Fax:  [phone]                                       Paco Ponce   MRN: [id number]    Department:  [facility]   Date of Visit:  12/23/2017           Patient Information     Date Of Birth          1980        Your diagnoses for this visit were:     Anaphylaxis, initial encounter     Urticaria     Acute sinusitis, recurrence not specified, unspecified location        You were seen by Hu Morales MD.      Follow-up Information     Follow up with Zhao Kirkland Jr., MD.    Specialties:  Family Practice, Obstetrics    Why:  As needed    Contact information:    [address] [place] [id number] [phone]        Discharge References/Attachments     ANAPHYLAXIS, GENERAL (ENGLISH)      24 Hour Appointment Hotline       To make an appointment at any [facility] clinic, call [phone] ([phone]). If you don't have a family doctor or clinic, we will help you find one. [facility] clinics are conveniently located to serve the needs of you and your family.             Review of your medicines      START taking        Dose / Directions Last dose taken    diphenhydrAMINE 25 MG tablet   Commonly known as:  BENADRYL   Dose:  25-50 mg   Quantity:  56 tablet        Take 1-2 tablets (25-50 mg) by mouth every 6 hours as needed for allergies   Refills:  0        doxycycline 100 MG capsule   Commonly known as:  VIBRAMYCIN   Dose:  100 mg   Quantity:  20 capsule        Take 1 capsule (100 mg) by mouth 2 times daily for 10 days   Refills:  0        predniSONE 20 MG tablet   Commonly known as:  DELTASONE   Quantity:  9 tablet        2 tab daily for 3 days, then 1 tab daily for 2 days, then 1/2 tab daily for 2 days   Refills:  0        ranitidine 150 MG tablet   Commonly known as:  ZANTAC   Dose:  150 mg   Quantity:  14 tablet        Take 1 tablet (150  mg) by mouth 2 times daily for 7 days   Refills:  0          Our records show that you are taking the medicines listed below. If these are incorrect, please call your family doctor or clinic.        Dose / Directions Last dose taken    cyclobenzaprine 10 MG tablet   Commonly known as:  FLEXERIL   Dose:  5-10 mg   Quantity:  30 tablet        Take 0.5-1 tablets (5-10 mg) by mouth 3 times daily as needed for muscle spasms   Refills:  0        metoprolol 25 MG 24 hr tablet   Commonly known as:  TOPROL-XL   Dose:  25 mg   Quantity:  90 tablet        Take 1 tablet (25 mg) by mouth daily   Refills:  3        omeprazole 20 MG CR capsule   Commonly known as:  priLOSEC   Dose:  20 mg   Quantity:  90 capsule        Take 1 capsule (20 mg) by mouth daily   Refills:  1                Prescriptions were sent or printed at these locations (4 Prescriptions)                   Other Prescriptions                Printed at Department/Unit printer (4 of 4)         doxycycline (VIBRAMYCIN) 100 MG capsule               predniSONE (DELTASONE) 20 MG tablet               ranitidine (ZANTAC) 150 MG tablet               diphenhydrAMINE (BENADRYL) 25 MG tablet                Procedures and tests performed during your visit     Peripheral IV: Standard    Pulse oximetry nursing      Orders Needing Specimen Collection     None      Pending Results     No orders found for last 3 day(s).            Pending Culture Results     No orders found for last 3 day(s).            Pending Results Instructions     If you had any lab results that were not finalized at the time of your Discharge, you can call the ED Lab Result RN at [phone]. You will be contacted by this team for any positive Lab results or changes in treatment. The nurses are available 7 days a week from 10A to 6:30P.  You can leave a message 24 hours per day and they will return your call.        Test Results From Your Hospital Stay               Clinical Quality Measure: Blood Pressure  Screening     Your blood pressure was checked while you were in the emergency department today. The last reading we obtained was  BP: (!) 147/101 . Please read the guidelines below about what these numbers mean and what you should do about them.  If your systolic blood pressure (the top number) is less than 120 and your diastolic blood pressure (the bottom number) is less than 80, then your blood pressure is normal. There is nothing more that you need to do about it.  If your systolic blood pressure (the top number) is 120-139 or your diastolic blood pressure (the bottom number) is 80-89, your blood pressure may be higher than it should be. You should have your blood pressure rechecked within a year by a primary care provider.  If your systolic blood pressure (the top number) is 140 or greater or your diastolic blood pressure (the bottom number) is 90 or greater, you may have high blood pressure. High blood pressure is treatable, but if left untreated over time it can put you at risk for heart attack, stroke, or kidney failure. You should have your blood pressure rechecked by a primary care provider within the next 4 weeks.  If your provider in the emergency department today gave you specific instructions to follow-up with your doctor or provider even sooner than that, you should follow that instruction and not wait for up to 4 weeks for your follow-up visit.        Thank you for choosing [facility]       Thank you for choosing [facility] for your care. Our goal is always to provide you with excellent care. Hearing back from our patients is one way we can continue to improve our services. Please take a few minutes to complete the written survey that you may receive in the mail after you visit with us. Thank you!        [facility] Information     [employer] lets you send messages to your doctor, view your test results, renew your prescriptions, schedule appointments and more. To sign up, go to [email or web address] . Click  "on \"Log in\" on the left side of the screen, which will take you to the Welcome page. Then click on \"Sign up Now\" on the right side of the page.     You will be asked to enter the access code listed below, as well as some personal information. Please follow the directions to create your username and password.     Your access code is: [email or web address]  Expires: 2017 11:11 AM     Your access code will  in 90 days. If you need help or a new code, please call your [facility] clinic or [phone].        Care EveryWhere ID     This is your Care EveryWhere ID. This could be used by other organizations to access your [facility] medical records  [id number]        Equal Access to Services     COLLEEN CASTELLANOS : Anoop Rodriguez, wamary manuel, anibal pedrazaalflorian armstrong, steve spencer. So [facility] [phone].    ATENCIÓN: Si habla español, tiene a nicholson disposición servicios gratuitos de asistencia lingüística. Llame al [phone].    We comply with applicable federal civil rights laws and [place] laws. We do not discriminate on the basis of race, color, national origin, age, disability, sex, sexual orientation, or gender identity.            After Visit Summary       This is your record. Keep this with you and show to your community pharmacist(s) and doctor(s) at your next visit.                  "

## 2017-12-23 NOTE — ED AVS SNAPSHOT
[facility] Emergency Department    [address]    [facility] [address]    Phone:  [phone]    Fax:  [phone]                                       Paco Ponce   MRN: [id number]    Department:  [facility] Emergency Department   Date of Visit:  12/23/2017           After Visit Summary Signature Page     I have received my discharge instructions, and my questions have been answered. I have discussed any challenges I see with this plan with the nurse or doctor.    ..........................................................................................................................................  Patient/Patient Representative Signature      ..........................................................................................................................................  Patient Representative Print Name and Relationship to Patient    ..................................................               ................................................  Date                                            Time    ..........................................................................................................................................  Reviewed by Signature/Title    ...................................................              ..............................................  Date                                                            Time

## 2017-12-24 VITALS
TEMPERATURE: 97.9 F | RESPIRATION RATE: 22 BRPM | HEART RATE: 97 BPM | DIASTOLIC BLOOD PRESSURE: 97 MMHG | OXYGEN SATURATION: 95 % | SYSTOLIC BLOOD PRESSURE: 138 MMHG

## 2017-12-24 PROCEDURE — 25000125 ZZHC RX 250

## 2017-12-24 PROCEDURE — 96361 HYDRATE IV INFUSION ADD-ON: CPT

## 2017-12-24 PROCEDURE — 25000128 H RX IP 250 OP 636: Performed by: EMERGENCY MEDICINE

## 2017-12-24 PROCEDURE — 96375 TX/PRO/DX INJ NEW DRUG ADDON: CPT

## 2017-12-24 PROCEDURE — 96374 THER/PROPH/DIAG INJ IV PUSH: CPT

## 2017-12-24 PROCEDURE — 96372 THER/PROPH/DIAG INJ SC/IM: CPT

## 2017-12-24 PROCEDURE — 25000125 ZZHC RX 250: Performed by: EMERGENCY MEDICINE

## 2017-12-24 RX ORDER — PREDNISONE 20 MG/1
TABLET ORAL
Qty: 9 TABLET | Refills: 0 | Status: SHIPPED | OUTPATIENT
Start: 2017-12-24 | End: 2017-12-31

## 2017-12-24 RX ORDER — DIPHENHYDRAMINE HCL 25 MG
25-50 TABLET ORAL EVERY 6 HOURS PRN
Qty: 56 TABLET | Refills: 0 | Status: SHIPPED | OUTPATIENT
Start: 2017-12-24 | End: 2020-03-05

## 2017-12-24 RX ORDER — DOXYCYCLINE 100 MG/1
100 CAPSULE ORAL 2 TIMES DAILY
Qty: 20 CAPSULE | Refills: 0 | Status: SHIPPED | OUTPATIENT
Start: 2017-12-24 | End: 2018-01-03

## 2017-12-24 RX ADMIN — SODIUM CHLORIDE 1000 ML: 9 INJECTION, SOLUTION INTRAVENOUS at 00:08

## 2017-12-24 RX ADMIN — EPINEPHRINE 0.3 MG: 1 INJECTION INTRAMUSCULAR; INTRAVENOUS; SUBCUTANEOUS at 00:02

## 2017-12-24 RX ADMIN — METHYLPREDNISOLONE SODIUM SUCCINATE 125 MG: 125 INJECTION, POWDER, FOR SOLUTION INTRAMUSCULAR; INTRAVENOUS at 00:08

## 2017-12-24 RX ADMIN — DIPHENHYDRAMINE HYDROCHLORIDE 50 MG: 50 INJECTION, SOLUTION INTRAMUSCULAR; INTRAVENOUS at 00:02

## 2017-12-24 RX ADMIN — IPRATROPIUM BROMIDE AND ALBUTEROL SULFATE 3 ML: .5; 3 SOLUTION RESPIRATORY (INHALATION) at 00:00

## 2017-12-24 RX ADMIN — RANITIDINE HYDROCHLORIDE 50 MG: 25 INJECTION INTRAMUSCULAR; INTRAVENOUS at 00:07

## 2017-12-24 ASSESSMENT — ENCOUNTER SYMPTOMS
SHORTNESS OF BREATH: 1
WHEEZING: 1

## 2017-12-24 NOTE — ED NOTES
Pt awoke tonight with increased SOB/wheezing and hives, states he took Amoxicillin and Tessalon pearls at 2130 today for sinus infection. ABC's intact, alert and oriented x3.

## 2017-12-24 NOTE — ED PROVIDER NOTES
History     Chief Complaint:  Allergic Reaction      HPI   Paco Ponce is a 37 year old male who presents to the emergency department for evaluation of an allergic reaction. The patient was recently diagnosed with a sinus infection of which he had symptoms of for 2 weeks and was given amoxicillin and Tessalon pearls. The patient states that he took this medicine at around 2130 and went to bed. He woke up and says that he couldn't breathe and was wheezing with hives on his skin.     Allergies:  No known Drug Allergies      Medications:    AMOXICILLIN PO - s/p 1 dose  Benzonatate (TESSALON PO) - s/p 1 dose    cyclobenzaprine (FLEXERIL) 10 MG tablet  omeprazole (PRILOSEC) 20 MG CR capsule  metoprolol (TOPROL-XL) 25 MG 24 hr tablet    Past Medical History:    Bowel obstruction  Diverticulitis   Hypertension  Kidney stone  Migraine  Obesity    Past Surgical History:    Appendectomy  ENT surgery  Hernia repair    Family History:    History reviewed. No pertinent family history.     Social History:  Marital Status:     Social History   Substance Use Topics     Smoking status: Never Smoker     Smokeless tobacco: Never Used     Alcohol use 0.0 oz/week     0 Standard drinks or equivalent per week      Comment: per wk 1-2        Review of Systems   Respiratory: Positive for shortness of breath and wheezing.    Skin: Positive for rash.   All other systems reviewed and are negative.        Physical Exam     Patient Vitals for the past 24 hrs:   BP Temp Temp src Heart Rate Resp SpO2   12/24/17 0326 - - - - - 95 %   12/24/17 0255 (!) 138/97 - - - - -   12/24/17 0145 (!) 147/101 - - 75 - 95 %   12/24/17 0130 - - - 73 - 95 %   12/24/17 0125 - - - 75 - 96 %   12/24/17 0115 (!) 151/110 - - 74 - 96 %   12/24/17 0113 - - - 77 - 97 %   12/24/17 0101 - - - 80 - 97 %   12/24/17 0100 (!) 147/96 - - - - -   12/24/17 0045 (!) 150/96 - - 72 - 96 %   12/24/17 0030 (!) 151/96 - - 76 - 97 %   12/24/17 0028 - - - 82 - 100 %    12/24/17 0015 (!) 162/105 - - - - -   12/24/17 0000 (!) 148/97 - - 83 - 97 %   12/23/17 2356 (!) 186/103 97.9  F (36.6  C) Oral 97 22 100 %           Physical Exam   Nursing note and vitals reviewed.  Constitutional: Cooperative. Anxious appearing   HENT:   Mouth/Throat: Mucous membranes are normal.   Cardiovascular: Tachycardic rate, regular rhythm and normal heart sounds.  No murmur.  Pulmonary/Chest: Effort normal. No respiratory distress. No rales. Wheezing with mild stridor  Abdominal: Soft. Normal appearance and bowel sounds are normal. No distension. There is no tenderness. There is no rigidity and no guarding.   Neurological: Alert. Oriented x4  Skin: Skin is warm and dry. Diffuse urticharial rash  Psychiatric: Normal mood and affect.       Emergency Department Course       Interventions:  0002 Epi 0.3 mg IM  0002 benadryl 50 mg IV  0000 albuterol 3 mls Neb  0008 NS 1L IV Bolus  0008 Solumedrol 125 mg IV  0007 Zantac 50 mg IV      Emergency Department Course:  Nursing notes and vitals reviewed.  I performed an exam of the patient as documented above.   I discussed the treatment plan with the patient. They expressed understanding of this plan and consented to discharge. They will be discharged home with instructions for care and follow up. In addition, the patient will return to the emergency department if their symptoms persist, worsen, if new symptoms arise or if there is any concern.  All questions were answered.     I personally reviewed the physical exam results with the patient and answered all related questions prior to discharge.  Impression & Plan      Medical Decision Making:  This patient is a 37 years old male who presents with symtpoms consitent with acute anaphylaxis, wheezing, mild stridor, diffuse urticaria. He was never hypotensive and was mainatining his airway. Aggressive administraation epinephrine as well as standrad anaphylaxis medications as above. He had immediate imporvement and  will be observed in the ED for several hours. Assuming he does well he will be discharged home on the streoid course as well H1 and H2 blockers with return precatuions. Most likely cause of the anaphylactic reaction is the amoxicillin he just started. I advised him to avoid pencillins in the furture and lucy change him to doxcyxycline to treat his 2 weeks of sinus infection sytmpoms.     Diagnosis:    ICD-10-CM    1. Anaphylaxis, initial encounter T78.2XXA    2. Urticaria L50.9    3. Acute sinusitis, recurrence not specified, unspecified location J01.90      Disposition:   Discharged      Discharge Medications:  New Prescriptions    DIPHENHYDRAMINE (BENADRYL) 25 MG TABLET    Take 1-2 tablets (25-50 mg) by mouth every 6 hours as needed for allergies    DOXYCYCLINE (VIBRAMYCIN) 100 MG CAPSULE    Take 1 capsule (100 mg) by mouth 2 times daily for 10 days    PREDNISONE (DELTASONE) 20 MG TABLET    2 tab daily for 3 days, then 1 tab daily for 2 days, then 1/2 tab daily for 2 days    RANITIDINE (ZANTAC) 150 MG TABLET    Take 1 tablet (150 mg) by mouth 2 times daily for 7 days       Scribe Disclosure:  IRaymond, am serving as a scribe at 12:06 AM on 12/24/2017 to document services personally performed by Hu Morales MD, based on my observations and the provider's statements to me.      [facility] EMERGENCY DEPARTMENT       Hu Morales MD  12/24/17 3353

## 2018-02-05 ENCOUNTER — OFFICE VISIT (OUTPATIENT)
Dept: FAMILY MEDICINE | Facility: CLINIC | Age: 38
End: 2018-02-05
Payer: COMMERCIAL

## 2018-02-05 VITALS
WEIGHT: 249 LBS | TEMPERATURE: 97.8 F | SYSTOLIC BLOOD PRESSURE: 142 MMHG | OXYGEN SATURATION: 98 % | HEART RATE: 99 BPM | BODY MASS INDEX: 33.72 KG/M2 | HEIGHT: 72 IN | DIASTOLIC BLOOD PRESSURE: 103 MMHG

## 2018-02-05 DIAGNOSIS — E66.01 MORBID OBESITY DUE TO EXCESS CALORIES (H): ICD-10-CM

## 2018-02-05 DIAGNOSIS — R52 DIFFUSE PAIN: ICD-10-CM

## 2018-02-05 DIAGNOSIS — I10 HYPERTENSION GOAL BP (BLOOD PRESSURE) < 140/90: Primary | ICD-10-CM

## 2018-02-05 LAB
ALBUMIN SERPL-MCNC: 4.2 G/DL (ref 3.4–5)
ALP SERPL-CCNC: 109 U/L (ref 40–150)
ALT SERPL W P-5'-P-CCNC: 28 U/L (ref 0–70)
ANION GAP SERPL CALCULATED.3IONS-SCNC: 7 MMOL/L (ref 3–14)
AST SERPL W P-5'-P-CCNC: 18 U/L (ref 0–45)
BASOPHILS # BLD AUTO: 0 10E9/L (ref 0–0.2)
BASOPHILS NFR BLD AUTO: 0.4 %
BILIRUB SERPL-MCNC: 0.3 MG/DL (ref 0.2–1.3)
BUN SERPL-MCNC: 11 MG/DL (ref 7–30)
CALCIUM SERPL-MCNC: 9.5 MG/DL (ref 8.5–10.1)
CHLORIDE SERPL-SCNC: 107 MMOL/L (ref 94–109)
CO2 SERPL-SCNC: 27 MMOL/L (ref 20–32)
CREAT SERPL-MCNC: 1.08 MG/DL (ref 0.66–1.25)
CRP SERPL-MCNC: <2.9 MG/L (ref 0–8)
DEPRECATED CALCIDIOL+CALCIFEROL SERPL-MC: 15 UG/L (ref 20–75)
DIFFERENTIAL METHOD BLD: NORMAL
EOSINOPHIL # BLD AUTO: 0.1 10E9/L (ref 0–0.7)
EOSINOPHIL NFR BLD AUTO: 1.8 %
ERYTHROCYTE [DISTWIDTH] IN BLOOD BY AUTOMATED COUNT: 12.8 % (ref 10–15)
ERYTHROCYTE [SEDIMENTATION RATE] IN BLOOD BY WESTERGREN METHOD: 6 MM/H (ref 0–15)
GFR SERPL CREATININE-BSD FRML MDRD: 76 ML/MIN/1.7M2
GLUCOSE SERPL-MCNC: 92 MG/DL (ref 70–99)
HCT VFR BLD AUTO: 42.5 % (ref 40–53)
HGB BLD-MCNC: 14.7 G/DL (ref 13.3–17.7)
LYMPHOCYTES # BLD AUTO: 1.6 10E9/L (ref 0.8–5.3)
LYMPHOCYTES NFR BLD AUTO: 32.6 %
MCH RBC QN AUTO: 29.8 PG (ref 26.5–33)
MCHC RBC AUTO-ENTMCNC: 34.6 G/DL (ref 31.5–36.5)
MCV RBC AUTO: 86 FL (ref 78–100)
MONOCYTES # BLD AUTO: 0.4 10E9/L (ref 0–1.3)
MONOCYTES NFR BLD AUTO: 8.4 %
NEUTROPHILS # BLD AUTO: 2.8 10E9/L (ref 1.6–8.3)
NEUTROPHILS NFR BLD AUTO: 56.8 %
PLATELET # BLD AUTO: 272 10E9/L (ref 150–450)
POTASSIUM SERPL-SCNC: 4.3 MMOL/L (ref 3.4–5.3)
PROT SERPL-MCNC: 7.5 G/DL (ref 6.8–8.8)
RBC # BLD AUTO: 4.93 10E12/L (ref 4.4–5.9)
SODIUM SERPL-SCNC: 141 MMOL/L (ref 133–144)
TSH SERPL DL<=0.005 MIU/L-ACNC: 2.96 MU/L (ref 0.4–4)
URATE SERPL-MCNC: 6.3 MG/DL (ref 3.5–7.2)
WBC # BLD AUTO: 4.9 10E9/L (ref 4–11)

## 2018-02-05 PROCEDURE — 86780 TREPONEMA PALLIDUM: CPT | Performed by: FAMILY MEDICINE

## 2018-02-05 PROCEDURE — 99214 OFFICE O/P EST MOD 30 MIN: CPT | Performed by: FAMILY MEDICINE

## 2018-02-05 PROCEDURE — 82306 VITAMIN D 25 HYDROXY: CPT | Performed by: FAMILY MEDICINE

## 2018-02-05 PROCEDURE — 80050 GENERAL HEALTH PANEL: CPT | Performed by: FAMILY MEDICINE

## 2018-02-05 PROCEDURE — 86431 RHEUMATOID FACTOR QUANT: CPT | Performed by: FAMILY MEDICINE

## 2018-02-05 PROCEDURE — 85652 RBC SED RATE AUTOMATED: CPT | Performed by: FAMILY MEDICINE

## 2018-02-05 PROCEDURE — 86038 ANTINUCLEAR ANTIBODIES: CPT | Performed by: FAMILY MEDICINE

## 2018-02-05 PROCEDURE — 86140 C-REACTIVE PROTEIN: CPT | Performed by: FAMILY MEDICINE

## 2018-02-05 PROCEDURE — 84550 ASSAY OF BLOOD/URIC ACID: CPT | Performed by: FAMILY MEDICINE

## 2018-02-05 PROCEDURE — 36415 COLL VENOUS BLD VENIPUNCTURE: CPT | Performed by: FAMILY MEDICINE

## 2018-02-05 RX ORDER — METOPROLOL SUCCINATE 50 MG/1
50 TABLET, EXTENDED RELEASE ORAL DAILY
Qty: 30 TABLET | Refills: 0 | Status: SHIPPED | OUTPATIENT
Start: 2018-02-05 | End: 2018-02-26

## 2018-02-05 NOTE — MR AVS SNAPSHOT
"              After Visit Summary   2/5/2018    Paco Ponce    MRN: [id number]           Patient Information     Date Of Birth          1980        Visit Information        Provider Department      2/5/2018 8:20 AM Zhao Kirkland Jr., MD [facility]        Today's Diagnoses     Hypertension goal BP (blood pressure) < 140/90    -  1    Morbid obesity due to excess calories (H)        Diffuse pain           Follow-ups after your visit        Follow-up notes from your care team     Return in about 3 weeks (around 2/26/2018) for BP Recheck and recheck pain.      Who to contact     If you have questions or need follow up information about today's clinic visit or your schedule please contact [facility] [place] directly at [phone].  Normal or non-critical lab and imaging results will be communicated to you by MyChart, letter or phone within 4 business days after the clinic has received the results. If you do not hear from us within 7 days, please contact the clinic through MyChart or phone. If you have a critical or abnormal lab result, we will notify you by phone as soon as possible.  Submit refill requests through [facility] or call your pharmacy and they will forward the refill request to us. Please allow 3 business days for your refill to be completed.          Additional Information About Your Visit        MyChart Information     [facility] lets you send messages to your doctor, view your test results, renew your prescriptions, schedule appointments and more. To sign up, go to [email or web address] . Click on \"Log in\" on the left side of the screen, which will take you to the Welcome page. Then click on \"Sign up Now\" on the right side of the page.     You will be asked to enter the access code listed below, as well as some personal information. Please follow the directions to create your username and password.     Your access code is: [email or web address]  Expires: 5/6/2018  8:51 AM   "   Your access code will  in 90 days. If you need help or a new code, please call your [facility] clinic or [phone].        Care EveryWhere ID     This is your Care EveryWhere ID. This could be used by other organizations to access your [facility] medical records  [id number]        Your Vitals Were     Pulse Temperature Height Pulse Oximetry BMI (Body Mass Index)       99 97.8  F (36.6  C) (Oral) 6' (1.829 m) 98% 33.77 kg/m2        Blood Pressure from Last 3 Encounters:   18 (!) 142/103   17 (!) 138/97   17 (!) 128/92    Weight from Last 3 Encounters:   18 249 lb (112.9 kg)   17 249 lb (112.9 kg)   17 250 lb 3.2 oz (113.5 kg)              We Performed the Following     Anti Nuclear Nay IgG by IFA with Reflex     Anti Treponema     CBC with platelets differential     Comprehensive metabolic panel (BMP + Alb, Alk Phos, ALT, AST, Total. Bili, TP)     CRP inflammation     Erythrocyte sedimentation rate auto     Rheumatoid factor     TSH with free T4 reflex     Uric acid     Vitamin D Deficiency          Today's Medication Changes          These changes are accurate as of 18  8:51 AM.  If you have any questions, ask your nurse or doctor.               These medicines have changed or have updated prescriptions.        Dose/Directions    metoprolol succinate 50 MG 24 hr tablet   Commonly known as:  TOPROL-XL   This may have changed:    - medication strength  - how much to take   Used for:  Hypertension goal BP (blood pressure) < 140/90   Changed by:  Zhao Kirkland Jr., MD        Dose:  50 mg   Take 1 tablet (50 mg) by mouth daily   Quantity:  30 tablet   Refills:  0            Where to get your medicines      These medications were sent to [facility] - [place], [place] - [address]  [address], [place] [address]     Phone:  [phone]     metoprolol succinate 50 MG 24 hr tablet                Primary Care Provider Office Phone # Fax #    Zhao  ANI Kirkland Jr., [phone]       [address]  [place] [address]        Equal Access to Services     COLLEEN CASTELLANOS : Anoop cathi carbajal noa Rodriguez, waverada luqtab, qacierata kaermada patti, steve pekrinscoretta tj. So [facility] [phone].    ATENCIÓN: Si habla español, tiene a nicholson disposición servicios gratuitos de asistencia lingüística. Llame al [phone].    We comply with applicable federal civil rights laws and [place] laws. We do not discriminate on the basis of race, color, national origin, age, disability, sex, sexual orientation, or gender identity.            Thank you!     Thank you for choosing [facility]  for your care. Our goal is always to provide you with excellent care. Hearing back from our patients is one way we can continue to improve our services. Please take a few minutes to complete the written survey that you may receive in the mail after your visit with us. Thank you!             Your Updated Medication List - Protect others around you: Learn how to safely use, store and throw away your medicines at www.disposemymeds.org.          This list is accurate as of 2/5/18  8:51 AM.  Always use your most recent med list.                   Brand Name Dispense Instructions for use Diagnosis    cyclobenzaprine 10 MG tablet    FLEXERIL    30 tablet    Take 0.5-1 tablets (5-10 mg) by mouth 3 times daily as needed for muscle spasms    Acute right-sided low back pain without sciatica       diphenhydrAMINE 25 MG tablet    BENADRYL    56 tablet    Take 1-2 tablets (25-50 mg) by mouth every 6 hours as needed for allergies        metoprolol succinate 50 MG 24 hr tablet    TOPROL-XL    30 tablet    Take 1 tablet (50 mg) by mouth daily    Hypertension goal BP (blood pressure) < 140/90       omeprazole 20 MG CR capsule    priLOSEC    90 capsule    Take 1 capsule (20 mg) by mouth daily    Gastroesophageal reflux disease without esophagitis

## 2018-02-05 NOTE — NURSING NOTE
Chief Complaint   Patient presents with     Recheck Medication     Hypertension       Initial BP (!) 164/92  Pulse 99  Temp 97.8  F (36.6  C) (Oral)  Ht 6' (1.829 m)  Wt 249 lb (112.9 kg)  SpO2 98%  BMI 33.77 kg/m2 Estimated body mass index is 33.77 kg/(m^2) as calculated from the following:    Height as of this encounter: 6' (1.829 m).    Weight as of this encounter: 249 lb (112.9 kg).  Medication Reconciliation: complete

## 2018-02-05 NOTE — LETTER
[facility]                      [phone]   February 7, 2018    Paco Ponce  [address] [facility] [address]      Dear Paco,    Here is a summary of your recent test results:    Liver and gallbladder tests are normal. (ALT,AST, Alk phos, bilirubin), kidney function is normal (Cr, GFR), Sodium is normal, Potassium is normal, Calcium is normal, Glucose is normal (diabetes screening test).   -TSH (thyroid stimulating hormone) level is normal which indicates normal thyroid function.   -Normal red blood cell (hgb) levels, normal white blood cell count and normal platelet levels.   -Vitamin D level is low.  I'm going to start you on a vitamin D supplement once per week for two months.  Then you should take 800 IU of vitamin D daily in an over the counter supplement.  Lastly, we should recheck your vitamin D level in three months Paco.  This might be causing some of your issues.  I sent a prescription for vitamin D to [facility] for you.   -The remainder of your labs are all normal.     Your test results are enclosed.      Please contact me if you have any questions.             Thank you very much for trusting [facility].     Healthy regards,  Zhao Kirkland Jr, MD          Results for orders placed or performed in visit on 02/05/18   TSH with free T4 reflex   Result Value Ref Range    TSH 2.96 0.40 - 4.00 mU/L   CRP inflammation   Result Value Ref Range    CRP Inflammation <2.9 0.0 - 8.0 mg/L   Erythrocyte sedimentation rate auto   Result Value Ref Range    Sed Rate 6 0 - 15 mm/h   Rheumatoid factor   Result Value Ref Range    Rheumatoid Factor <20 <20 IU/mL   Anti Nuclear Nay IgG by IFA with Reflex   Result Value Ref Range    MIGDALIA interpretation Negative NEG^Negative   Uric acid   Result Value Ref Range    Uric Acid 6.3 3.5 - 7.2 mg/dL   Anti Treponema   Result Value Ref Range    Treponema pallidum Antibody Negative NEG^Negative   Vitamin D Deficiency    Result Value Ref Range    Vitamin D Deficiency screening 15 (L) 20 - 75 ug/L   CBC with platelets differential   Result Value Ref Range    WBC 4.9 4.0 - 11.0 10e9/L    RBC Count 4.93 4.4 - 5.9 10e12/L    Hemoglobin 14.7 13.3 - 17.7 g/dL    Hematocrit 42.5 40.0 - 53.0 %    MCV 86 78 - 100 fl    MCH 29.8 26.5 - 33.0 pg    MCHC 34.6 31.5 - 36.5 g/dL    RDW 12.8 10.0 - 15.0 %    Platelet Count 272 150 - 450 10e9/L    Diff Method Automated Method     % Neutrophils 56.8 %    % Lymphocytes 32.6 %    % Monocytes 8.4 %    % Eosinophils 1.8 %    % Basophils 0.4 %    Absolute Neutrophil 2.8 1.6 - 8.3 10e9/L    Absolute Lymphocytes 1.6 0.8 - 5.3 10e9/L    Absolute Monocytes 0.4 0.0 - 1.3 10e9/L    Absolute Eosinophils 0.1 0.0 - 0.7 10e9/L    Absolute Basophils 0.0 0.0 - 0.2 10e9/L   Comprehensive metabolic panel (BMP + Alb, Alk Phos, ALT, AST, Total. Bili, TP)   Result Value Ref Range    Sodium 141 133 - 144 mmol/L    Potassium 4.3 3.4 - 5.3 mmol/L    Chloride 107 94 - 109 mmol/L    Carbon Dioxide 27 20 - 32 mmol/L    Anion Gap 7 3 - 14 mmol/L    Glucose 92 70 - 99 mg/dL    Urea Nitrogen 11 7 - 30 mg/dL    Creatinine 1.08 0.66 - 1.25 mg/dL    GFR Estimate 76 >60 mL/min/1.7m2    GFR Estimate If Black >90 >60 mL/min/1.7m2    Calcium 9.5 8.5 - 10.1 mg/dL    Bilirubin Total 0.3 0.2 - 1.3 mg/dL    Albumin 4.2 3.4 - 5.0 g/dL    Protein Total 7.5 6.8 - 8.8 g/dL    Alkaline Phosphatase 109 40 - 150 U/L    ALT 28 0 - 70 U/L    AST 18 0 - 45 U/L

## 2018-02-05 NOTE — PROGRESS NOTES
"  SUBJECTIVE:   Paco Ponce is a 37 year old male who presents to clinic today for the following health issues:    Hypertension Follow-up      Outpatient blood pressures are not being checked.    Low Salt Diet: not monitoring salt    Amount of exercise or physical activity: active job     Problems taking medications regularly: No    Medication side effects: none    Diet: regular (no restrictions)      Pain- Pt states he has had wide spread pain constantly, has seen chiropractor and massage therapy, taken flexeril and nothing has really helped, wife suggested he say something. He has been dealing with generalized pain around for years. No hx of trauma that he recalls. Pain is worse in his ribs, chest, shoulders, trapezius, and legs. He tried to get off the couch and things \"lock\". He is not sleeping well at night, moves around a lot in bed. He never wakes up in the mornings feeling refreshed or energized. Pt has had nerves in his neck \"burned\" couple times, has had injections in the back of his neck as well.  Has family history of depression in his mother who takes several medications to manage this.    BP- He takes his Metoprolol everyday, has not noticed any side effects with it. He is unsure why he was put on Metoprolol. He has noticed he has a lot of tension HA's, has hx of migraines, but Metoprolol has not helped with his migraines.  I'm thinking this may have been started in an effort to prophylactically treat his migraines.        Problem list and histories reviewed & adjusted, as indicated.  Additional history: as documented    BP Readings from Last 3 Encounters:   02/05/18 (!) 164/92   12/24/17 (!) 138/97   11/30/17 (!) 128/92    Wt Readings from Last 3 Encounters:   02/05/18 112.9 kg (249 lb)   11/30/17 112.9 kg (249 lb)   09/26/17 113.5 kg (250 lb 3.2 oz)        Reviewed and updated as needed this visit by clinical staff  Allergies  Meds  Med Hx       Reviewed and updated as needed this visit by " Provider       ROS:  Constitutional, HEENT, cardiovascular, pulmonary, gi and gu systems are negative, except as otherwise noted.    This document serves as a record of the services and decisions personally performed and made by Zhao Kirkland MD. It was created on his behalf by Yuval Saavedra, a trained medical scribe. The creation of this document is based on the provider's statements to the medical scribe.  Yuval Saavedra 8:32 AM February 5, 2018    OBJECTIVE:     BP (!) 164/92  Pulse 99  Temp 97.8  F (36.6  C) (Oral)  Ht 1.829 m (6')  Wt 112.9 kg (249 lb)  SpO2 98%  BMI 33.77 kg/m2  Body mass index is 33.77 kg/(m^2).  GENERAL: healthy, alert and no distress  HENT: ear canals and TM's normal, nose and mouth without ulcers or lesions  NECK: no adenopathy, no asymmetry, masses, or scars and thyroid normal to palpation  RESP: lungs clear to auscultation - no rales, rhonchi or wheezes  CV: regular rate and rhythm, normal S1 S2, no S3 or S4, no murmur, click or rub, no peripheral edema and peripheral pulses strong  ABDOMEN: soft, nontender, no hepatosplenomegaly, no masses and bowel sounds normal  MS: no gross musculoskeletal defects noted, no edema  PSYCH: mentation appears normal, affect normal/bright    Diagnostic Test Results:  No results found for this or any previous visit (from the past 24 hour(s)).    ASSESSMENT/PLAN:     (I10) Hypertension goal BP (blood pressure) < 140/90  (primary encounter diagnosis)  Comment: Will double his Metoprolol; my hope is this decreases the severity and frequency of his migraines, as well as control his BP better. Discussed with him that fatigue is a common side effect with Metoprolol; if his fatigue worsens with doubled dose in 3 weeks, will pursue alternative BP medication.  Would consider lisinopril.  Plan: metoprolol succinate (TOPROL-XL) 50 MG 24 hr         tablet, Comprehensive metabolic panel (BMP +         Alb, Alk Phos, ALT, AST, Total. Bili, TP)        Will  have him follow up in 3 weeks to recheck his BP, and go over blood work results.    (E66.01) Morbid obesity due to excess calories (H)  Comment: Discussed with pt that given his weight has been stable, his elevated BP is unlikely due to his weight alone. However, encouraged frequent exercise and healthy diet if possible to help improve his BP.  Plan: Follow up if needed.    (R52) Diffuse pain  Comment: Given his generalized pain, will draw blood work to check for any underlying concerns that will hopefully give us an idea of what to which treatment route to pursue. If blood work does not show any significant findings, can pursue depression medications such as Cymbalta given his pain may be attributed to the physical nature of depression.  Would also consider referral to rheumatology to ensure we're not missing anything.  Plan: TSH with free T4 reflex, CRP inflammation,         Erythrocyte sedimentation rate auto, Rheumatoid        factor, Anti Nuclear Nay IgG by IFA with         Reflex, Uric acid, Anti Treponema, Vitamin D         Deficiency, CBC with platelets differential,         Comprehensive metabolic panel (BMP + Alb, Alk         Phos, ALT, AST, Total. Bili, TP)        Will go over blood work results when pt follows up in 2-3 weeks.    The information in this document, created by the medical scribe for me, accurately reflects the services I personally performed and the decisions made by me. I have reviewed and approved this document for accuracy prior to leaving the patient care area.  February 5, 2018 8:32 AM    Zhao Kirkland Jr, MD  [facility]

## 2018-02-06 DIAGNOSIS — R79.89 LOW VITAMIN D LEVEL: Primary | ICD-10-CM

## 2018-02-06 LAB
ANA SER QL IF: NEGATIVE
RHEUMATOID FACT SER NEPH-ACNC: <20 IU/ML (ref 0–20)
T PALLIDUM IGG+IGM SER QL: NEGATIVE

## 2018-02-06 RX ORDER — ERGOCALCIFEROL 1.25 MG/1
50000 CAPSULE, LIQUID FILLED ORAL
Qty: 8 CAPSULE | Refills: 0 | Status: SHIPPED | OUTPATIENT
Start: 2018-02-06 | End: 2018-03-26

## 2018-02-07 NOTE — PROGRESS NOTES
Please send the following letter:    Mr. Ponce,    -Liver and gallbladder tests are normal. (ALT,AST, Alk phos, bilirubin), kidney function is normal (Cr, GFR), Sodium is normal, Potassium is normal, Calcium is normal, Glucose is normal (diabetes screening test).   -TSH (thyroid stimulating hormone) level is normal which indicates normal thyroid function.  -Normal red blood cell (hgb) levels, normal white blood cell count and normal platelet levels.  -Vitamin D level is low.  I'm going to start you on a vitamin D supplement once per week for two months.  Then you should take 800 IU of vitamin D daily in an over the counter supplement.  Lastly, we should recheck your vitamin D level in three months Paco.  This might be causing some of your issues.  I sent a prescription for vitamin D to [facility] for you.  -The remainder of your labs are all normal.    If you have further questions about the interpretation of your labs, [email or web address] is a good website to check out for further information.      Please contact the clinic if you have additional questions.  Thank you.    Sincerely,    Zhao Kirkland MD

## 2018-02-26 ENCOUNTER — OFFICE VISIT (OUTPATIENT)
Dept: FAMILY MEDICINE | Facility: CLINIC | Age: 38
End: 2018-02-26
Payer: COMMERCIAL

## 2018-02-26 VITALS
HEIGHT: 72 IN | SYSTOLIC BLOOD PRESSURE: 112 MMHG | WEIGHT: 252 LBS | HEART RATE: 88 BPM | DIASTOLIC BLOOD PRESSURE: 80 MMHG | BODY MASS INDEX: 34.13 KG/M2 | TEMPERATURE: 98 F | OXYGEN SATURATION: 98 %

## 2018-02-26 DIAGNOSIS — F41.9 ANXIETY: Primary | ICD-10-CM

## 2018-02-26 DIAGNOSIS — I10 HYPERTENSION GOAL BP (BLOOD PRESSURE) < 140/90: ICD-10-CM

## 2018-02-26 DIAGNOSIS — R79.89 LOW SERUM VITAMIN D: ICD-10-CM

## 2018-02-26 DIAGNOSIS — F32.1 MAJOR DEPRESSIVE DISORDER, SINGLE EPISODE, MODERATE (H): ICD-10-CM

## 2018-02-26 DIAGNOSIS — G43.109 MIGRAINE WITH AURA AND WITHOUT STATUS MIGRAINOSUS, NOT INTRACTABLE: ICD-10-CM

## 2018-02-26 PROCEDURE — 99214 OFFICE O/P EST MOD 30 MIN: CPT | Performed by: FAMILY MEDICINE

## 2018-02-26 RX ORDER — SUMATRIPTAN 100 MG/1
100 TABLET, FILM COATED ORAL
Qty: 9 TABLET | Refills: 1 | Status: SHIPPED | OUTPATIENT
Start: 2018-02-26 | End: 2020-03-05

## 2018-02-26 RX ORDER — METOPROLOL SUCCINATE 50 MG/1
50 TABLET, EXTENDED RELEASE ORAL DAILY
Qty: 90 TABLET | Refills: 1 | Status: SHIPPED | OUTPATIENT
Start: 2018-02-26 | End: 2018-08-28

## 2018-02-26 RX ORDER — VENLAFAXINE HYDROCHLORIDE 37.5 MG/1
CAPSULE, EXTENDED RELEASE ORAL
Qty: 46 CAPSULE | Refills: 1 | Status: SHIPPED | OUTPATIENT
Start: 2018-02-26 | End: 2018-03-26

## 2018-02-26 ASSESSMENT — ANXIETY QUESTIONNAIRES
5. BEING SO RESTLESS THAT IT IS HARD TO SIT STILL: MORE THAN HALF THE DAYS
1. FEELING NERVOUS, ANXIOUS, OR ON EDGE: MORE THAN HALF THE DAYS
2. NOT BEING ABLE TO STOP OR CONTROL WORRYING: MORE THAN HALF THE DAYS
7. FEELING AFRAID AS IF SOMETHING AWFUL MIGHT HAPPEN: SEVERAL DAYS
3. WORRYING TOO MUCH ABOUT DIFFERENT THINGS: MORE THAN HALF THE DAYS
6. BECOMING EASILY ANNOYED OR IRRITABLE: MORE THAN HALF THE DAYS
GAD7 TOTAL SCORE: 13

## 2018-02-26 ASSESSMENT — PATIENT HEALTH QUESTIONNAIRE - PHQ9: 5. POOR APPETITE OR OVEREATING: MORE THAN HALF THE DAYS

## 2018-02-26 NOTE — MR AVS SNAPSHOT
"              After Visit Summary   2/26/2018    Paco Ponce    MRN: [id number]           Patient Information     Date Of Birth          1980        Visit Information        Provider Department      2/26/2018 8:20 AM Zhao Kirkland Jr., MD [facility]        Today's Diagnoses     Anxiety    -  1    Major depressive disorder, single episode, moderate (H)        Migraine with aura and without status migrainosus, not intractable        Low serum vitamin D        Hypertension goal BP (blood pressure) < 140/90           Follow-ups after your visit        Follow-up notes from your care team     Return in about 4 weeks (around 3/26/2018) for Recheck depression, anxiety and migraine headaches.      Who to contact     If you have questions or need follow up information about today's clinic visit or your schedule please contact [facility] [place] directly at [phone].  Normal or non-critical lab and imaging results will be communicated to you by MyChart, letter or phone within 4 business days after the clinic has received the results. If you do not hear from us within 7 days, please contact the clinic through MyChart or phone. If you have a critical or abnormal lab result, we will notify you by phone as soon as possible.  Submit refill requests through Spry or call your pharmacy and they will forward the refill request to us. Please allow 3 business days for your refill to be completed.          Additional Information About Your Visit        MyChart Information     Spry lets you send messages to your doctor, view your test results, renew your prescriptions, schedule appointments and more. To sign up, go to [email or web address] . Click on \"Log in\" on the left side of the screen, which will take you to the Welcome page. Then click on \"Sign up Now\" on the right side of the page.     You will be asked to enter the access code listed below, as well as some personal information. " Please follow the directions to create your username and password.     Your access code is: [email or web address]  Expires: 2018  8:51 AM     Your access code will  in 90 days. If you need help or a new code, please call your [facility] clinic or [phone].        Care EveryWhere ID     This is your Care EveryWhere ID. This could be used by other organizations to access your [facility] medical records  [id number]        Your Vitals Were     Pulse Temperature Height Pulse Oximetry BMI (Body Mass Index)       88 98  F (36.7  C) (Oral) 6' (1.829 m) 98% 34.18 kg/m2        Blood Pressure from Last 3 Encounters:   18 112/80   18 (!) 142/103   17 (!) 138/97    Weight from Last 3 Encounters:   18 252 lb (114.3 kg)   18 249 lb (112.9 kg)   17 249 lb (112.9 kg)              Today, you had the following     No orders found for display         Today's Medication Changes          These changes are accurate as of 18  8:46 AM.  If you have any questions, ask your nurse or doctor.               Start taking these medicines.        Dose/Directions    SUMAtriptan 100 MG tablet   Commonly known as:  IMITREX   Used for:  Migraine with aura and without status migrainosus, not intractable   Started by:  Zhao Kirkland Jr., MD        Dose:  100 mg   Take 1 tablet (100 mg) by mouth at onset of headache for migraine May repeat in 2 hours. Max 2 tablets/24 hours.   Quantity:  9 tablet   Refills:  1       venlafaxine 37.5 MG 24 hr capsule   Commonly known as:  EFFEXOR-XR   Used for:  Anxiety, Major depressive disorder, single episode, moderate (H)   Started by:  Zhao Kirkland Jr., MD        Take 1 capsule daily for 14 days, then take 2 capsules daily.   Quantity:  46 capsule   Refills:  1            Where to get your medicines      These medications were sent to [facility] [id number] [facility] - [place], [place] - [address] [address]  [address] [address], [place] [address]     Phone:  [phone]      metoprolol succinate 50 MG 24 hr tablet    SUMAtriptan 100 MG tablet    venlafaxine 37.5 MG 24 hr capsule                Primary Care Provider Office Phone # Fax #    Zhao Kirkland Jr., [phone] [address]  [place] [address]        Equal Access to Services     [facility] : Hadii aad ku hadasho Soomaali, waaxda luqadaha, qaybta kaalmada adeegyada, waxay idiin haylen aderobert victor laamorcoretta tj. So [facility] [phone].    ATENCIÓN: Si habla español, tiene a nicholson disposición servicios gratuitos de asistencia lingüística. [facility] [phone].    We comply with applicable federal civil rights laws and [place] laws. We do not discriminate on the basis of race, color, national origin, age, disability, sex, sexual orientation, or gender identity.            Thank you!     Thank you for choosing [facility]  for your care. Our goal is always to provide you with excellent care. Hearing back from our patients is one way we can continue to improve our services. Please take a few minutes to complete the written survey that you may receive in the mail after your visit with us. Thank you!             Your Updated Medication List - Protect others around you: Learn how to safely use, store and throw away your medicines at www.disposemymeds.org.          This list is accurate as of 2/26/18  8:46 AM.  Always use your most recent med list.                   Brand Name Dispense Instructions for use Diagnosis    cyclobenzaprine 10 MG tablet    FLEXERIL    30 tablet    Take 0.5-1 tablets (5-10 mg) by mouth 3 times daily as needed for muscle spasms    Acute right-sided low back pain without sciatica       diphenhydrAMINE 25 MG tablet    BENADRYL    56 tablet    Take 1-2 tablets (25-50 mg) by mouth every 6 hours as needed for allergies        metoprolol succinate 50 MG 24 hr tablet    TOPROL-XL    90 tablet    Take 1 tablet (50 mg) by mouth daily    Hypertension goal BP (blood pressure) < 140/90,  Migraine with aura and without status migrainosus, not intractable       omeprazole 20 MG CR capsule    priLOSEC    90 capsule    Take 1 capsule (20 mg) by mouth daily    Gastroesophageal reflux disease without esophagitis       SUMAtriptan 100 MG tablet    IMITREX    9 tablet    Take 1 tablet (100 mg) by mouth at onset of headache for migraine May repeat in 2 hours. Max 2 tablets/24 hours.    Migraine with aura and without status migrainosus, not intractable       venlafaxine 37.5 MG 24 hr capsule    EFFEXOR-XR    46 capsule    Take 1 capsule daily for 14 days, then take 2 capsules daily.    Anxiety, Major depressive disorder, single episode, moderate (H)       vitamin D 12775 UNIT capsule    ERGOCALCIFEROL    8 capsule    Take 1 capsule (50,000 Units) by mouth every 7 days for 8 doses    Low vitamin D level

## 2018-02-26 NOTE — PROGRESS NOTES
"  SUBJECTIVE:   Paco Ponce is a 37 year old male who presents to clinic today for the following health issues:    Hypertension Follow-up      Outpatient blood pressures are not being checked.    Low Salt Diet: not monitoring salt    Followup:    Facility:  [facility] [place]  Date of visit: 2/5/18  Reason for visit: Pain- Pt states he has had wide spread pain constantly, has seen chiropractor and massage therapy, taken flexeril and nothing has really helped, wife suggested he say something. He has been dealing with generalized pain for years. No hx of trauma that he recalls. Pain is worse in his ribs, chest, shoulders, trapezius, and legs. He tried to get off the couch and things \"lock\". He is not sleeping well at night, moves around a lot in bed. He never wakes up in the mornings feeling refreshed or energized. Pt has had nerves in his neck \"burned\" couple times, has had injections in the back of his neck as well.  Has family history of depression in his mother who takes several medications to manage this.  Current Status: still having wide spread pain, not different with taking Vit D (has had 2 doses so far)        Pain- Pt is still having wide spread pain. He has not noticed a difference with his vitamin D levels in regards to improving his pain.    Anxiety/depression- Pt has stressful life at home, has 2 kids, one of which has ADD. Pt and his wife both work full time as well and has to deal with all of these things together. His mother has anxiety and depression. He assumes his dad has anxiety and depression as well but is not on medications. His brother has chemical dependency which he uses to cope with anxiety and depression.     HA's- Pt still struggles with HA's, had migraine last night. He only takes Excedrin for them. He always gets Toradol shots when he goes to ER which helps right away. He has gone to neurologist; burned nerves in neck 2X which helped but he still gets migraines. He now gets migraines " 2-3X a month; it used to be 2X a week before he had nerves in his neck burned. He has times where he experiences emesis with his migraines. He has never been on Imitrex; he would rather not do injection version, wants to try pill version.        Problem list and histories reviewed & adjusted, as indicated.  Additional history: as documented    Reviewed and updated as needed this visit by clinical staff  Allergies  Meds  Med Hx       Reviewed and updated as needed this visit by Provider       ROS:  Constitutional, HEENT, cardiovascular, pulmonary, gi and gu systems are negative, except as otherwise noted.    This document serves as a record of the services and decisions personally performed and made by Zhao Kirkland MD. It was created on his behalf by Yuval Saavedra, a trained medical scribe. The creation of this document is based on the provider's statements to the medical scribe.  Yuval Saavedra 8:27 AM February 26, 2018    OBJECTIVE:     /80  Pulse 88  Temp 98  F (36.7  C) (Oral)  Ht 1.829 m (6')  Wt 114.3 kg (252 lb)  SpO2 98%  BMI 34.18 kg/m2  Body mass index is 34.18 kg/(m^2).  GENERAL: healthy, alert and no distress  PSYCH: mentation appears normal, affect normal/bright    Diagnostic Test Results:  none     ASSESSMENT/PLAN:     (F41.9) Anxiety  (primary encounter diagnosis)  Comment: Discussed with pt that anxiety and depression can be associated with pain. Discussed with him that I think it would be worthwhile to treat his anxiety/depression to see if this helps his chronic pain. Therefore, I recommended we put him on 75 mg Effexor to improve his anxiety/depression. This dose will boost his energy, but will also hopefully minimize sex drive side effects. Discussed with him that HA's, nausea, dizziness, and fatigue are all possible side effects in first 2 weeks on Effexor. If he is doing well on 75 mg dose, will have him continue with it. However, if his anxiety/depression is still not  improved, will increase dose to 150 mg.  Plan: venlafaxine (EFFEXOR-XR) 37.5 MG 24 hr capsule        Follow up in 1 month for recheck.    (F32.1) Major depressive disorder, single episode, moderate (H)  Comment: See above for plan.  Plan: venlafaxine (EFFEXOR-XR) 37.5 MG 24 hr capsule        Follow up in 1 month for recheck.    (G43.109) Migraine with aura and without status migrainosus, not intractable  Comment: Pt still having HA's 2X a week; has never been on prescription medication for these. Recommended Imitrex; advised him to take it at the onset of his HA's. If he gets an aura with his HA's, advised him to take his Imitrex as soon as aura develops.  Plan: SUMAtriptan (IMITREX) 100 MG tablet, metoprolol        succinate (TOPROL-XL) 50 MG 24 hr tablet        Will recheck his HA's in 1 month when he follows up next.    (R79.89) Low serum vitamin D  Comment: Pt taking 50,000 units vitamin D every 7 days without noticing a difference in regards to his pain. Discussed with pt that it is too early to notice a difference; can take 6 weeks before he notices a difference with his vitamin D supplements. Will recheck him in 2 months to reevaluate his vitamin D levels. Until then, as the weather improves, I recommended 5 minutes of sun exposure to help increase vitamin D levels as well as this is the main source of vitamin D.  Plan: Will recheck his Vitamin D levels in 2 months, and will see if his pain has improved at that time as well.    (I10) Hypertension goal BP (blood pressure) < 140/90  Comment: BP was good today (112/80), will refill and have him continue with his metoprolol as prescribed. Discussed with pt that I am hoping that his HA's will also improve as we continue to control his BP.  Plan: metoprolol succinate (TOPROL-XL) 50 MG 24 hr         tablet        Follow up if needed.    The information in this document, created by the medical scribe for me, accurately reflects the services I personally performed and  the decisions made by me. I have reviewed and approved this document for accuracy prior to leaving the patient care area.  February 26, 2018 8:27 AM    Zhao Kirkland Jr, MD  [facility]

## 2018-02-26 NOTE — NURSING NOTE
Chief Complaint   Patient presents with     Hypertension     Pain       Initial /80  Pulse 88  Temp 98  F (36.7  C) (Oral)  Ht 6' (1.829 m)  Wt 252 lb (114.3 kg)  SpO2 98%  BMI 34.18 kg/m2 Estimated body mass index is 34.18 kg/(m^2) as calculated from the following:    Height as of this encounter: 6' (1.829 m).    Weight as of this encounter: 252 lb (114.3 kg).  Medication Reconciliation: complete

## 2018-02-27 ENCOUNTER — HOSPITAL ENCOUNTER (EMERGENCY)
Facility: CLINIC | Age: 38
Discharge: HOME | End: 2018-02-27
Attending: EMERGENCY MEDICINE | Admitting: EMERGENCY MEDICINE
Payer: COMMERCIAL

## 2018-02-27 VITALS
RESPIRATION RATE: 20 BRPM | OXYGEN SATURATION: 96 % | DIASTOLIC BLOOD PRESSURE: 97 MMHG | TEMPERATURE: 100.8 F | HEART RATE: 109 BPM | SYSTOLIC BLOOD PRESSURE: 138 MMHG

## 2018-02-27 DIAGNOSIS — K21.9 GASTROESOPHAGEAL REFLUX DISEASE WITHOUT ESOPHAGITIS: ICD-10-CM

## 2018-02-27 DIAGNOSIS — J11.1 INFLUENZA-LIKE ILLNESS: ICD-10-CM

## 2018-02-27 LAB
ALBUMIN SERPL-MCNC: 4.1 G/DL (ref 3.4–5)
ALP SERPL-CCNC: 101 U/L (ref 40–150)
ALT SERPL W P-5'-P-CCNC: 28 U/L (ref 0–70)
ANION GAP SERPL CALCULATED.3IONS-SCNC: 5 MMOL/L (ref 3–14)
AST SERPL W P-5'-P-CCNC: 20 U/L (ref 0–45)
BILIRUB SERPL-MCNC: 0.8 MG/DL (ref 0.2–1.3)
BUN SERPL-MCNC: 13 MG/DL (ref 7–30)
CALCIUM SERPL-MCNC: 9 MG/DL (ref 8.5–10.1)
CHLORIDE SERPL-SCNC: 103 MMOL/L (ref 94–109)
CK SERPL-CCNC: 93 U/L (ref 30–300)
CO2 SERPL-SCNC: 29 MMOL/L (ref 20–32)
CREAT SERPL-MCNC: 1.16 MG/DL (ref 0.66–1.25)
GFR SERPL CREATININE-BSD FRML MDRD: 70 ML/MIN/1.7M2
GLUCOSE SERPL-MCNC: 123 MG/DL (ref 70–99)
HGB BLD-MCNC: 14.7 G/DL (ref 13.3–17.7)
LIPASE SERPL-CCNC: 87 U/L (ref 73–393)
POTASSIUM SERPL-SCNC: 3.8 MMOL/L (ref 3.4–5.3)
PROT SERPL-MCNC: 7.5 G/DL (ref 6.8–8.8)
SODIUM SERPL-SCNC: 137 MMOL/L (ref 133–144)

## 2018-02-27 PROCEDURE — 25000132 ZZH RX MED GY IP 250 OP 250 PS 637: Performed by: EMERGENCY MEDICINE

## 2018-02-27 PROCEDURE — 96374 THER/PROPH/DIAG INJ IV PUSH: CPT

## 2018-02-27 PROCEDURE — 96375 TX/PRO/DX INJ NEW DRUG ADDON: CPT

## 2018-02-27 PROCEDURE — 99284 EMERGENCY DEPT VISIT MOD MDM: CPT | Mod: 25

## 2018-02-27 PROCEDURE — 25000128 H RX IP 250 OP 636: Performed by: EMERGENCY MEDICINE

## 2018-02-27 PROCEDURE — 80053 COMPREHEN METABOLIC PANEL: CPT | Performed by: EMERGENCY MEDICINE

## 2018-02-27 PROCEDURE — 83690 ASSAY OF LIPASE: CPT | Performed by: EMERGENCY MEDICINE

## 2018-02-27 PROCEDURE — 85018 HEMOGLOBIN: CPT | Performed by: EMERGENCY MEDICINE

## 2018-02-27 PROCEDURE — 82550 ASSAY OF CK (CPK): CPT | Performed by: EMERGENCY MEDICINE

## 2018-02-27 PROCEDURE — 96361 HYDRATE IV INFUSION ADD-ON: CPT

## 2018-02-27 RX ORDER — NAPROXEN 500 MG/1
500 TABLET ORAL 2 TIMES DAILY WITH MEALS
Qty: 16 TABLET | Refills: 0 | Status: SHIPPED | OUTPATIENT
Start: 2018-02-27 | End: 2018-03-07

## 2018-02-27 RX ORDER — KETOROLAC TROMETHAMINE 15 MG/ML
15 INJECTION, SOLUTION INTRAMUSCULAR; INTRAVENOUS ONCE
Status: COMPLETED | OUTPATIENT
Start: 2018-02-27 | End: 2018-02-27

## 2018-02-27 RX ORDER — ONDANSETRON 4 MG/1
4 TABLET, ORALLY DISINTEGRATING ORAL EVERY 6 HOURS PRN
Qty: 10 TABLET | Refills: 0 | Status: SHIPPED | OUTPATIENT
Start: 2018-02-27 | End: 2018-03-02

## 2018-02-27 RX ORDER — ONDANSETRON 2 MG/ML
8 INJECTION INTRAMUSCULAR; INTRAVENOUS ONCE
Status: COMPLETED | OUTPATIENT
Start: 2018-02-27 | End: 2018-02-27

## 2018-02-27 RX ORDER — ACETAMINOPHEN 500 MG
1000 TABLET ORAL ONCE
Status: COMPLETED | OUTPATIENT
Start: 2018-02-27 | End: 2018-02-27

## 2018-02-27 RX ADMIN — SODIUM CHLORIDE 1000 ML: 9 INJECTION, SOLUTION INTRAVENOUS at 01:35

## 2018-02-27 RX ADMIN — ONDANSETRON 8 MG: 2 INJECTION INTRAMUSCULAR; INTRAVENOUS at 01:35

## 2018-02-27 RX ADMIN — KETOROLAC TROMETHAMINE 15 MG: 15 INJECTION, SOLUTION INTRAMUSCULAR; INTRAVENOUS at 01:36

## 2018-02-27 RX ADMIN — ACETAMINOPHEN 1000 MG: 500 TABLET, FILM COATED ORAL at 01:35

## 2018-02-27 ASSESSMENT — ENCOUNTER SYMPTOMS
SORE THROAT: 0
CHILLS: 0
ABDOMINAL PAIN: 1
MYALGIAS: 1
SHORTNESS OF BREATH: 0
VOMITING: 0
COUGH: 0
DIARRHEA: 0
HEMATURIA: 0
FREQUENCY: 0
RHINORRHEA: 0
NAUSEA: 0
DYSURIA: 0
FEVER: 1
HEADACHES: 1

## 2018-02-27 ASSESSMENT — PATIENT HEALTH QUESTIONNAIRE - PHQ9: SUM OF ALL RESPONSES TO PHQ QUESTIONS 1-9: 13

## 2018-02-27 ASSESSMENT — ANXIETY QUESTIONNAIRES: GAD7 TOTAL SCORE: 13

## 2018-02-27 NOTE — LETTER
February 27, 2018      To Whom It May Concern:      Paco Ponce was seen in our Emergency Department today, 02/27/18.  I expect his condition to improve over the next 3-5 days.  He may return to work when improved.    Sincerely,        Julius Barnett MD

## 2018-02-27 NOTE — ED PROVIDER NOTES
"  History     Chief Complaint:  \"My whole body hurts\"    HPI   Paco Ponce is a 37-year-old male who presents for evaluation of multiple complaints.  He states that he has diffuse myalgias all over his body.  The patient reports this started in the afternoon but went away for some time.  Unfortunately, the pain returned along with a fever.  He states that he took 400 mg ibuprofen with minimal relief of his symptoms.  The patient remarks that he has a headache and states that his muscles including the chest wall has been sensitive to touch.  The patient also reports that he has epigastric abdominal pain that is sharp and has been waxing and waning.  He states that this started after eating.  He has not noticed any other aggravating or alleviating factors for this.  He denies any nausea, vomiting, diarrhea, bloody stools.  The patient has not had any other recent infectious symptoms such as cough, sore throat, urinary symptoms, congestion, or runny nose.  He has had no known ill contacts.  He states that he was at his PCP this morning for a blood pressure check and felt completely normal.    Allergies:  Amoxicillin--Anaphylaxis      Medications:    Effexor   Imitrex   Metoprolol   Flexeril   Omeprazole     Past Medical History:    Anxiety   Bowel obstruction   Depression   Diverticulitis   Hypertension   Kidney stone   Migraine   Obesity     Past Surgical History:    Appendectomy   Hernia repair   ENT Surgery   Knee arthroscopy, left     Family History:    History reviewed. No pertinent family history.     Social History:  Marital Status:    Presents to the ED alone   Tobacco Use: Never  Alcohol Use: Yes   PCP: Zhao Kirkland Jr      Review of Systems   Constitutional: Positive for fever. Negative for chills.   HENT: Negative for congestion, rhinorrhea and sore throat.    Respiratory: Negative for cough and shortness of breath.    Cardiovascular: Negative for chest pain.   Gastrointestinal: Positive " for abdominal pain (epigastric; sharp; waxing and waning). Negative for diarrhea, nausea and vomiting.   Genitourinary: Negative for dysuria, frequency, hematuria and urgency.   Musculoskeletal: Positive for myalgias.   Neurological: Positive for headaches.   All other systems reviewed and are negative.    Physical Exam     Patient Vitals for the past 24 hrs:   BP Temp Temp src Pulse Resp SpO2   02/27/18 0215 (!) 150/97 - - - - 96 %   02/27/18 0209 - - - - - 96 %   02/27/18 0208 (!) 148/97 - - - - 99 %   02/27/18 0035 (!) 160/117 100.8  F (38.2  C) Temporal 109 20 98 %        Physical Exam    GEN:    Pleasant, age appropriate.     Resting comfortably in the bed.  HEENT:    Tympanic membranes are clear bilateral.      Oropharynx is moist.       No tonsillar erythema, exudate or asymmetric edema.     No deviation of the uvula.     No pooling of secretions, trismus or sublingual edema.  Eyes:    Conjunctiva normal, PERRL  Neck:    Supple, no meningismus.       No pain with manipulation of the hyoid.   CV:     Regular rate and rhythm.     No murmurs, rubs or gallops.       No peripheral edema.     2+ radial pulses bilateral  PULM:    Clear to auscultation bilateral.       No respiratory distress.       No stridor, rales or wheezing.     Anterior chest wall tender to touch.  ABD:    Soft, non-distended.      Mild-moderate epigastric-LUQ tenderness     No rigidity     No rebound or guarding.     No splenomegaly.  MSK:     No gross deformity to all four extremities.      Compartments are soft and compressible but tender in all 4 extremities.  LYMPH:   No cervical lymphadenopathy.  NEURO:   Alert.  Normal muscular tone, no atrophy.  Skin:    Warm, dry and intact.    PSYCH:    Mood is good and affect is appropriate.    Emergency Department Course   Laboratory:  Blood:  CMP: Glc 123, otherwise WNL (Creatinine 1.16)   Hemoglobin: 14.7  CK total: 93   Lipase: 87    Interventions:  (0135) Normal Saline, 1 liter, IV bolus    (0135) Zofran, 8 mg, IV injection   (0135) Tylenol, 1000 mg, PO   (0136) Toradol, 30 mg, IV injection     Emergency Department Course:  Nursing notes and vitals reviewed.    (0111) I entered the room with my scribe, obtained the history, and performed an exam of the patient as documented above.    A peripheral IV was established. Blood was drawn from the patient. This was sent for laboratory testing, findings above.      The patient received the interventions above.     (0226) I went to check in on the patient who reports that he is feeling better after the above interventions. Updated the patient on lab findings. Discussed Tamiflu and the patient states that he does not want to take this medication. Answered questions prior to discharge.     Findings and plan explained to the patient. Patient discharged home with instructions regarding supportive care, medications, and reasons to return. The importance of close follow-up was reviewed. The patient was prescribed Naproxen and  Zofran.     I personally reviewed the laboratory results with the patient and answered all related questions prior to discharge.         Impression & Plan    Medical Decision Makin-year-old male presented to the ED with influenza-like symptoms of diffuse myalgias, fever, nausea and epigastric pain.  Given his significant myalgias I did check a CK which revealed no evidence of rhabdomyolysis.  His abdominal examination is benign with reassuring laboratory studies.  Again symptoms are most consistent with influenza.  We opted against testing for influenza as he would not opt for Tamiflu if present.  We will focus on supportive measures.  Patient safe for discharge home and close follow-up with primary care physician as needed.    Diagnosis:    ICD-10-CM   1. Influenza-like illness R69     Disposition:  discharged to home    Discharge Medications:  New Prescriptions    NAPROXEN (NAPROSYN) 500 MG TABLET    Take 1 tablet (500 mg) by mouth 2  times daily (with meals) for 8 days    ONDANSETRON (ZOFRAN ODT) 4 MG ODT TAB    Take 1 tablet (4 mg) by mouth every 6 hours as needed for nausea           [facility] EMERGENCY DEPARTMENT      Scribe disclosure:   I, Jaycob Mcdermott, am serving as a scribe on 2/27/2018 at 1:11 AM to personally document services performed by Dr. Barnett based on my observations and the provider's statements to me.                Julius Barnett MD  02/27/18 0321

## 2018-02-27 NOTE — ED AVS SNAPSHOT
[facility] Emergency Department    [address]    [facility] [address]    Phone:  [phone]    Fax:  [phone]                                       Paco Ponce   MRN: [id number]    Department:  [facility] Emergency Department   Date of Visit:  2/27/2018           After Visit Summary Signature Page     I have received my discharge instructions, and my questions have been answered. I have discussed any challenges I see with this plan with the nurse or doctor.    ..........................................................................................................................................  Patient/Patient Representative Signature      ..........................................................................................................................................  Patient Representative Print Name and Relationship to Patient    ..................................................               ................................................  Date                                            Time    ..........................................................................................................................................  Reviewed by Signature/Title    ...................................................              ..............................................  Date                                                            Time

## 2018-02-27 NOTE — ED NOTES
Pt c/o   1. abd pain  2. Body pain  3. bilat rib pain  4. headaache  5. Torso pain    All yesterday.    Pt A&O x 3, CMS x 3, ABCD's adequate in triage

## 2018-02-27 NOTE — ED AVS SNAPSHOT
[facility] Emergency Department    [address] [place] [address]    Phone:  [phone]    Fax:  [phone]                                       Paco Ponce   MRN: [id number]    Department:  [facility] Emergency Department   Date of Visit:  2/27/2018           Patient Information     Date Of Birth          1980        Your diagnoses for this visit were:     Influenza-like illness        You were seen by Julius Barnett MD.      Follow-up Information     Follow up with Zhao Kirkland Jr., MD. Schedule an appointment as soon as possible for a visit in 5 days.    Specialties:  Family Practice, Obstetrics    Why:  As needed    Contact information:    [address] [address]  [phone]          Discharge Instructions       Discharge Instructions  Influenza    You were diagnosed today with influenza or influenza like illness.  Influenza is a respiratory (breathing) illness caused by influenza A or B viruses.  Influenza causes five primary symptoms: fever, headache, muscle aches/fatigue/malaise, sore throat and cough.  These symptoms start one to four days after you have been around a person with this illness. Influenza is spread through sneezing and coughing and can live on surfaces for several days.  It is usually contagious for 5 days but in some cases up to 10 days and often affects several family members. If you have a family member who is less than 2 years old, older than 65 years old, pregnant or has a serious medical condition, they should be seen right away by a provider to decide if they should take preventative medications. Although influenza will make you feel very ill, most patients don t require any specific treatment. An antiviral medication might be prescribed for certain groups of patients (older patients, younger patients, and those with certain chronic medical problems).    Generally, every Emergency Department visit  should have a follow-up clinic visit with either a primary or a specialty clinic/provider. Please follow-up as instructed by your emergency provider today.    Return to the Emergency Department if:    You have trouble breathing.    You develop pain in your chest.    You have signs of being dehydrated, such as dizziness or unable to urinate (pee) at least three times daily.    You are confused or severely weak.    You cannot stop vomiting (throwing up) or you cannot drink enough fluids.    In children, you should seek help if the child has any of the above or if child:    Has blue or purplish skin color.    Is so irritable that he or she does not want to be held.    Does not have tears when crying (in infants) or does not urinate at least three times daily.    Does not wake up easily.    What can I do to help myself?    Rest.    Fluids -- Drink hydrating solutions such as Gatorade  or Pedialyte  as often as you can. If you are drinking enough, you should pass urine at least every eight hours.    Tylenol  (acetaminophen) and Advil  (ibuprofen) can relieve fever, headache, and muscle aches. Do not give aspirin to children under 18 years old.     Antiviral treatment -- Antiviral medicines do not make the flu symptoms go away immediately.  They have only been shown to make the symptoms go away 12 to 24 hours sooner than they would without treatment.       Antibiotics -- Antibiotics are NOT useful for treating viral illnesses such as influenza. Antibiotics should only be used if there is a bacterial complication of the flu such as bacterial pneumonia, ear infection, or sinusitis.    Because you were diagnosed with a flu like illness you are very contagious.  This means you cannot work, attend school or  for at least 24 hours or until you no longer have a fever.  If you were given a prescription for medicine here today, be sure to read all of the information (including the package insert) that comes with your  prescription.  This will include important information about the medicine, its side effects, and any warnings that you need to know about.  The pharmacist who fills the prescription can provide more information and answer questions you may have about the medicine.  If you have questions or concerns that the pharmacist cannot address, please call or return to the Emergency Department.   Remember that you can always come back to the Emergency Department if you are not able to see your regular provider in the amount of time listed above, if you get any new symptoms, or if there is anything that worries you.      Future Appointments        Provider Department Dept Phone Center    3/26/2018 8:40 AM Zhao Kirkland Jr, MD [facility] [phone] [facility]      24 Hour Appointment Hotline       To make an appointment at any [facility], call [phone] ([phone]). If you don't have a family doctor or clinic, we will help you find one. [facility] are conveniently located to serve the needs of you and your family.             Review of your medicines      START taking        Dose / Directions Last dose taken    naproxen 500 MG tablet   Commonly known as:  NAPROSYN   Dose:  500 mg   Quantity:  16 tablet        Take 1 tablet (500 mg) by mouth 2 times daily (with meals) for 8 days   Refills:  0        ondansetron 4 MG ODT tab   Commonly known as:  ZOFRAN ODT   Dose:  4 mg   Quantity:  10 tablet        Take 1 tablet (4 mg) by mouth every 6 hours as needed for nausea   Refills:  0          Our records show that you are taking the medicines listed below. If these are incorrect, please call your family doctor or clinic.        Dose / Directions Last dose taken    cyclobenzaprine 10 MG tablet   Commonly known as:  FLEXERIL   Dose:  5-10 mg   Quantity:  30 tablet        Take 0.5-1 tablets (5-10 mg) by mouth 3 times daily as needed for muscle spasms   Refills:  0        diphenhydrAMINE 25 MG tablet    Commonly known as:  BENADRYL   Dose:  25-50 mg   Quantity:  56 tablet        Take 1-2 tablets (25-50 mg) by mouth every 6 hours as needed for allergies   Refills:  0        metoprolol succinate 50 MG 24 hr tablet   Commonly known as:  TOPROL-XL   Dose:  50 mg   Quantity:  90 tablet        Take 1 tablet (50 mg) by mouth daily   Refills:  1        omeprazole 20 MG CR capsule   Commonly known as:  priLOSEC   Dose:  20 mg   Quantity:  90 capsule        Take 1 capsule (20 mg) by mouth daily   Refills:  1        SUMAtriptan 100 MG tablet   Commonly known as:  IMITREX   Dose:  100 mg   Quantity:  9 tablet        Take 1 tablet (100 mg) by mouth at onset of headache for migraine May repeat in 2 hours. Max 2 tablets/24 hours.   Refills:  1        venlafaxine 37.5 MG 24 hr capsule   Commonly known as:  EFFEXOR-XR   Quantity:  46 capsule        Take 1 capsule daily for 14 days, then take 2 capsules daily.   Refills:  1        vitamin D 19180 UNIT capsule   Commonly known as:  ERGOCALCIFEROL   Dose:  52464 Units   Quantity:  8 capsule        Take 1 capsule (50,000 Units) by mouth every 7 days for 8 doses   Refills:  0                Prescriptions were sent or printed at these locations (2 Prescriptions)                   Other Prescriptions                Printed at Department/Unit printer (2 of 2)         ondansetron (ZOFRAN ODT) 4 MG ODT tab               naproxen (NAPROSYN) 500 MG tablet                Procedures and tests performed during your visit     CK total    Comprehensive metabolic panel    Hemoglobin    Lipase    Peripheral IV catheter      Orders Needing Specimen Collection     None      Pending Results     No orders found from 2/25/2018 to 2/28/2018.            Pending Culture Results     No orders found from 2/25/2018 to 2/28/2018.            Pending Results Instructions     If you had any lab results that were not finalized at the time of your Discharge, you can call the ED Lab Result RN at [phone]. You  will be contacted by this team for any positive Lab results or changes in treatment. The nurses are available 7 days a week from 10A to 6:30P.  You can leave a message 24 hours per day and they will return your call.        Test Results From Your Hospital Stay        2/27/2018  2:07 AM      Component Results     Component Value Ref Range & Units Status    Sodium 137 133 - 144 mmol/L Final    Potassium 3.8 3.4 - 5.3 mmol/L Final    Chloride 103 94 - 109 mmol/L Final    Carbon Dioxide 29 20 - 32 mmol/L Final    Anion Gap 5 3 - 14 mmol/L Final    Glucose 123 (H) 70 - 99 mg/dL Final    Urea Nitrogen 13 7 - 30 mg/dL Final    Creatinine 1.16 0.66 - 1.25 mg/dL Final    GFR Estimate 70 >60 mL/min/1.7m2 Final    Non  GFR Calc    GFR Estimate If Black 84 >60 mL/min/1.7m2 Final    African American GFR Calc    Calcium 9.0 8.5 - 10.1 mg/dL Final    Bilirubin Total 0.8 0.2 - 1.3 mg/dL Final    Albumin 4.1 3.4 - 5.0 g/dL Final    Protein Total 7.5 6.8 - 8.8 g/dL Final    Alkaline Phosphatase 101 40 - 150 U/L Final    ALT 28 0 - 70 U/L Final    AST 20 0 - 45 U/L Final         2/27/2018  2:07 AM      Component Results     Component Value Ref Range & Units Status    Lipase 87 73 - 393 U/L Final         2/27/2018  2:07 AM      Component Results     Component Value Ref Range & Units Status    CK Total 93 30 - 300 U/L Final         2/27/2018  1:46 AM      Component Results     Component Value Ref Range & Units Status    Hemoglobin 14.7 13.3 - 17.7 g/dL Final                Clinical Quality Measure: Blood Pressure Screening     Your blood pressure was checked while you were in the emergency department today. The last reading we obtained was  BP: (!) 150/97 . Please read the guidelines below about what these numbers mean and what you should do about them.  If your systolic blood pressure (the top number) is less than 120 and your diastolic blood pressure (the bottom number) is less than 80, then your blood pressure is  "normal. There is nothing more that you need to do about it.  If your systolic blood pressure (the top number) is 120-139 or your diastolic blood pressure (the bottom number) is 80-89, your blood pressure may be higher than it should be. You should have your blood pressure rechecked within a year by a primary care provider.  If your systolic blood pressure (the top number) is 140 or greater or your diastolic blood pressure (the bottom number) is 90 or greater, you may have high blood pressure. High blood pressure is treatable, but if left untreated over time it can put you at risk for heart attack, stroke, or kidney failure. You should have your blood pressure rechecked by a primary care provider within the next 4 weeks.  If your provider in the emergency department today gave you specific instructions to follow-up with your doctor or provider even sooner than that, you should follow that instruction and not wait for up to 4 weeks for your follow-up visit.        Thank you for choosing [facility]       Thank you for choosing [facility] for your care. Our goal is always to provide you with excellent care. Hearing back from our patients is one way we can continue to improve our services. Please take a few minutes to complete the written survey that you may receive in the mail after you visit with us. Thank you!        [facility] Information     [facility] lets you send messages to your doctor, view your test results, renew your prescriptions, schedule appointments and more. To sign up, go to [email or web address] . Click on \"Log in\" on the left side of the screen, which will take you to the Welcome page. Then click on \"Sign up Now\" on the right side of the page.     You will be asked to enter the access code listed below, as well as some personal information. Please follow the directions to create your username and password.     Your access code is: [email or web address]  Expires: 2018  8:51 AM     Your access code will  in " 90 days. If you need help or a new code, please call your [facility] clinic or [phone].        Care EveryWhere ID     This is your Care EveryWhere ID. This could be used by other organizations to access your [facility] medical records  [id number]        Equal Access to Services     COLLEEN CASTELLANOS : Anoop latham Sobry, waaxda luqadaha, qaybta kaalmada patti, steve spencer. So [facility] [phone].    ATENCIÓN: Si habla español, tiene a nicholson disposición servicios gratuitos de asistencia lingüística. Llame al [phone].    We comply with applicable federal civil rights laws and [place] laws. We do not discriminate on the basis of race, color, national origin, age, disability, sex, sexual orientation, or gender identity.            After Visit Summary       This is your record. Keep this with you and show to your community pharmacist(s) and doctor(s) at your next visit.

## 2018-03-26 ENCOUNTER — OFFICE VISIT (OUTPATIENT)
Dept: FAMILY MEDICINE | Facility: CLINIC | Age: 38
End: 2018-03-26
Payer: COMMERCIAL

## 2018-03-26 VITALS
WEIGHT: 246 LBS | BODY MASS INDEX: 33.32 KG/M2 | HEART RATE: 90 BPM | OXYGEN SATURATION: 96 % | DIASTOLIC BLOOD PRESSURE: 86 MMHG | TEMPERATURE: 98.6 F | HEIGHT: 72 IN | SYSTOLIC BLOOD PRESSURE: 130 MMHG

## 2018-03-26 DIAGNOSIS — G43.109 MIGRAINE WITH AURA AND WITHOUT STATUS MIGRAINOSUS, NOT INTRACTABLE: Primary | ICD-10-CM

## 2018-03-26 DIAGNOSIS — F33.1 MAJOR DEPRESSIVE DISORDER, RECURRENT EPISODE, MODERATE (H): ICD-10-CM

## 2018-03-26 DIAGNOSIS — E55.9 VITAMIN D DEFICIENCY: ICD-10-CM

## 2018-03-26 DIAGNOSIS — F41.9 ANXIETY: ICD-10-CM

## 2018-03-26 PROCEDURE — 99214 OFFICE O/P EST MOD 30 MIN: CPT | Performed by: FAMILY MEDICINE

## 2018-03-26 RX ORDER — VENLAFAXINE HYDROCHLORIDE 150 MG/1
150 CAPSULE, EXTENDED RELEASE ORAL DAILY
Qty: 30 CAPSULE | Refills: 1 | Status: SHIPPED | OUTPATIENT
Start: 2018-03-26 | End: 2018-05-21

## 2018-03-26 RX ORDER — KETOROLAC TROMETHAMINE 10 MG/1
10 TABLET, FILM COATED ORAL EVERY 6 HOURS PRN
Qty: 20 TABLET | Refills: 1 | Status: SHIPPED | OUTPATIENT
Start: 2018-03-26 | End: 2018-12-23

## 2018-03-26 ASSESSMENT — ANXIETY QUESTIONNAIRES
3. WORRYING TOO MUCH ABOUT DIFFERENT THINGS: SEVERAL DAYS
IF YOU CHECKED OFF ANY PROBLEMS ON THIS QUESTIONNAIRE, HOW DIFFICULT HAVE THESE PROBLEMS MADE IT FOR YOU TO DO YOUR WORK, TAKE CARE OF THINGS AT HOME, OR GET ALONG WITH OTHER PEOPLE: SOMEWHAT DIFFICULT
1. FEELING NERVOUS, ANXIOUS, OR ON EDGE: SEVERAL DAYS
5. BEING SO RESTLESS THAT IT IS HARD TO SIT STILL: NOT AT ALL
2. NOT BEING ABLE TO STOP OR CONTROL WORRYING: SEVERAL DAYS
7. FEELING AFRAID AS IF SOMETHING AWFUL MIGHT HAPPEN: SEVERAL DAYS
GAD7 TOTAL SCORE: 7
6. BECOMING EASILY ANNOYED OR IRRITABLE: NEARLY EVERY DAY

## 2018-03-26 ASSESSMENT — PATIENT HEALTH QUESTIONNAIRE - PHQ9: 5. POOR APPETITE OR OVEREATING: NOT AT ALL

## 2018-03-26 NOTE — PROGRESS NOTES
SUBJECTIVE:   Paco Ponce is a 37 year old male who presents to clinic today for the following health issues:    Depression and Anxiety Follow-Up    Status since last visit: small change, has not noticed any change since taking 2 tabs     Complicating factors:     Significant life event: No     Current substance abuse: None    PHQ-9 2/26/2018 3/26/2018   Total Score 13 7   Q9: Suicide Ideation Several days Several days   F/U: Thoughts of suicide or self-harm No No   F/U: Safety concerns No No     MAAME-7 SCORE 2/26/2018 3/26/2018   Total Score 13 7     PHQ-9  English  PHQ-9   Any Language  MAAME-7  Suicide Assessment Five-step Evaluation and Treatment (SAFE-T)    Amount of exercise or physical activity: None    Problems taking medications regularly: No    Medication side effects: Fatigue, lack of appetite      Diet: regular (no restrictions)      Anxiety/depression- Pt has noticed a slight improvement in his anxiety/depression with his current Effexor dose. He did notice his vision changed the first couple of days being on it, but this resolved shortly after. He also has noticed more irritability, and decreased appetite. His energy in the morning has increased though.    HA's- Pt has used Imitrex as prescribed, but has not noticed a huge difference. He took the Imitrex before the onset of. He is still getting a couple of HA's a week. He feels his HA's are tension related. He thinks the only think that has helped with HA's his a Toradol injection. He is wondering if he could get prescription for Toradol tablets. He only has 1 Imitrex tablet left.    He only has 1 tablet left of his 50,000 unit cholecalciferol supplements.        Problem list and histories reviewed & adjusted, as indicated.  Additional history: as documented    Reviewed and updated as needed this visit by clinical staff  Allergies  Meds  Med Hx       Reviewed and updated as needed this visit by Provider       ROS:  Constitutional, HEENT,  cardiovascular, pulmonary, gi and gu systems are negative, except as otherwise noted.    This document serves as a record of the services and decisions personally performed and made by Zhao Kirkland MD. It was created on his behalf by Yuval Saavedra, a trained medical scribe. The creation of this document is based on the provider's statements to the medical scribe.  Yuval Saavedra 8:53 AM March 26, 2018    OBJECTIVE:     /86  Pulse 90  Temp 98.6  F (37  C) (Oral)  Ht 1.829 m (6')  Wt 111.6 kg (246 lb)  SpO2 96%  BMI 33.36 kg/m2  Body mass index is 33.36 kg/(m^2).  GENERAL: healthy, alert and no distress  PSYCH: mentation appears normal, affect normal/bright    Diagnostic Test Results:  none     ASSESSMENT/PLAN:     (G43.109) Migraine with aura and without status migrainosus, not intractable  (primary encounter diagnosis)  Comment: Imitrex has not helped with his migraines. Pt has had Toradol injections in the past that have worked well to reduce his migraines; he is requesting Toradol tablets to see if this helps improve his migraines. Discussed with pt that if Toradol prescription does not help with his migraines, may recommend a daily preventative medication. However, discussed with pt that we will focus on doing one thing at a time; do not want to overload him on medications. My hope is that his Effexor will also help reduce the intensity and frequency of his HA's. Lastly, discussed with him that if we exhaust all our options, I will recommend doing a Botox injection as this has helped many patient's achieve relief from their chronic migraines if neck muscle spasm is the underlying culprit.  Plan: ketorolac (TORADOL) 10 MG tablet        Will recheck this on 1 month.    (F33.1) Major depressive disorder, recurrent episode, moderate (H)  Comment: Effexor has been helping improve his anxiety and depression. Pt did experience vision change when he first started taking his Effexor, but given this has  resolved, I recommended we increase his dose to 150 mg to help him achieve better control of his anxiety/depression; pt was in agreement with this.  Plan: venlafaxine (EFFEXOR-XR) 150 MG 24 hr capsule        Will have him follow up in 1 month to recheck his anxiety/depression to see how he is doing on 150 mg Effexor dose.    (F41.9) Anxiety  Comment: See above for plan.  Plan: venlafaxine (EFFEXOR-XR) 150 MG 24 hr capsule        Will recheck this in 1 month.    (E55.9) Vitamin D deficiency  Comment: Will transition him to daily vitamin D supplement.  Plan: cholecalciferol (VITAMIN D3) 1000 UNIT tablet        Will recheck his vitamin D levels when he follows up in 1 month.    The information in this document, created by the medical scribe for me, accurately reflects the services I personally performed and the decisions made by me. I have reviewed and approved this document for accuracy prior to leaving the patient care area.  March 26, 2018 8:52 AM    Zhao Kirkland Jr, MD  [facility]

## 2018-03-26 NOTE — NURSING NOTE
Chief Complaint   Patient presents with     Anxiety     Depression       Initial /86  Pulse 90  Temp 98.6  F (37  C) (Oral)  Ht 6' (1.829 m)  Wt 246 lb (111.6 kg)  SpO2 96%  BMI 33.36 kg/m2 Estimated body mass index is 33.36 kg/(m^2) as calculated from the following:    Height as of this encounter: 6' (1.829 m).    Weight as of this encounter: 246 lb (111.6 kg).  Medication Reconciliation: complete

## 2018-03-26 NOTE — MR AVS SNAPSHOT
"              After Visit Summary   3/26/2018    Paco Ponce    MRN: [id number]           Patient Information     Date Of Birth          1980        Visit Information        Provider Department      3/26/2018 8:40 AM Zhao Kirkland Jr., MD [facility]        Today's Diagnoses     Migraine with aura and without status migrainosus, not intractable    -  1    Major depressive disorder, recurrent episode, moderate (H)        Anxiety        Vitamin D deficiency           Follow-ups after your visit        Follow-up notes from your care team     Return in about 4 weeks (around 4/23/2018) for Depression, anxiety, migraines, vit D deficiency.      Who to contact     If you have questions or need follow up information about today's clinic visit or your schedule please contact [facility] [place] directly at [phone].  Normal or non-critical lab and imaging results will be communicated to you by MyChart, letter or phone within 4 business days after the clinic has received the results. If you do not hear from us within 7 days, please contact the clinic through MyChart or phone. If you have a critical or abnormal lab result, we will notify you by phone as soon as possible.  Submit refill requests through [employer] or call your pharmacy and they will forward the refill request to us. Please allow 3 business days for your refill to be completed.          Additional Information About Your Visit        MyChart Information     [employer] lets you send messages to your doctor, view your test results, renew your prescriptions, schedule appointments and more. To sign up, go to [email or web address] . Click on \"Log in\" on the left side of the screen, which will take you to the Welcome page. Then click on \"Sign up Now\" on the right side of the page.     You will be asked to enter the access code listed below, as well as some personal information. Please follow the directions to create your username and " password.     Your access code is: [email or web address]  Expires: 2018  9:51 AM     Your access code will  in 90 days. If you need help or a new code, please call your [facility] or [phone].        Care EveryWhere ID     This is your Care EveryWhere ID. This could be used by other organizations to access your [facility] medical records  [id number]        Your Vitals Were     Pulse Temperature Height Pulse Oximetry BMI (Body Mass Index)       90 98.6  F (37  C) (Oral) 6' (1.829 m) 96% 33.36 kg/m2        Blood Pressure from Last 3 Encounters:   18 130/86   18 (!) 138/97   18 112/80    Weight from Last 3 Encounters:   18 246 lb (111.6 kg)   18 252 lb (114.3 kg)   18 249 lb (112.9 kg)              Today, you had the following     No orders found for display         Today's Medication Changes          These changes are accurate as of 3/26/18  9:09 AM.  If you have any questions, ask your nurse or doctor.               Start taking these medicines.        Dose/Directions    cholecalciferol 1000 UNIT tablet   Commonly known as:  vitamin D3   Used for:  Vitamin D deficiency   Started by:  Zhao Kirkland Jr., MD        Dose:  1000 Units   Take 1 tablet (1,000 Units) by mouth daily   Quantity:  100 tablet   Refills:  3       ketorolac 10 MG tablet   Commonly known as:  TORADOL   Used for:  Migraine with aura and without status migrainosus, not intractable   Started by:  Zhao Kirkland Jr., MD        Dose:  10 mg   Take 1 tablet (10 mg) by mouth every 6 hours as needed for moderate pain   Quantity:  20 tablet   Refills:  1         These medicines have changed or have updated prescriptions.        Dose/Directions    venlafaxine 150 MG 24 hr capsule   Commonly known as:  EFFEXOR-XR   This may have changed:    - medication strength  - how much to take  - how to take this  - when to take this  - additional instructions   Used for:  Anxiety, Major depressive disorder,  recurrent episode, moderate (H)   Changed by:  Zhao Kirkland Jr., MD        Dose:  150 mg   Take 1 capsule (150 mg) by mouth daily   Quantity:  30 capsule   Refills:  1            Where to get your medicines      These medications were sent to [place] [id number] [facility] [place] [facility] [address]  [address] [facility] [address], [place] [address]     Phone:  [phone]     cholecalciferol 1000 UNIT tablet    ketorolac 10 MG tablet    venlafaxine 150 MG 24 hr capsule                Primary Care Provider Office Phone # Fax #    Zhao Kirkland Jr., [phone] [address]  [place] [address]        Equal Access to Services     [facility] : Hadii cathi carbajal hadasho Soomaali, waaxda luqadaha, qaybta kaalmada adeegyada, steve segovia . So [facility] [phone].    ATENCIÓN: Si habla español, tiene a nicholson disposición servicios gratuitos de asistencia lingüística. Llame al [phone].    We comply with applicable federal civil rights laws and [place] laws. We do not discriminate on the basis of race, color, national origin, age, disability, sex, sexual orientation, or gender identity.            Thank you!     Thank you for choosing [facility] (formerly [facility])  for your care. Our goal is always to provide you with excellent care. Hearing back from our patients is one way we can continue to improve our services. Please take a few minutes to complete the written survey that you may receive in the mail after your visit with us. Thank you!             Your Updated Medication List - Protect others around you: Learn how to safely use, store and throw away your medicines at www.disposemymeds.org.          This list is accurate as of 3/26/18  9:09 AM.  Always use your most recent med list.                   Brand Name Dispense Instructions for use Diagnosis    cholecalciferol 1000 UNIT tablet    vitamin D3    100 tablet    Take 1 tablet (1,000 Units) by mouth daily    Vitamin D  deficiency       diphenhydrAMINE 25 MG tablet    BENADRYL    56 tablet    Take 1-2 tablets (25-50 mg) by mouth every 6 hours as needed for allergies        ketorolac 10 MG tablet    TORADOL    20 tablet    Take 1 tablet (10 mg) by mouth every 6 hours as needed for moderate pain    Migraine with aura and without status migrainosus, not intractable       metoprolol succinate 50 MG 24 hr tablet    TOPROL-XL    90 tablet    Take 1 tablet (50 mg) by mouth daily    Hypertension goal BP (blood pressure) < 140/90, Migraine with aura and without status migrainosus, not intractable       omeprazole 20 MG CR capsule    priLOSEC    90 capsule    Take 1 capsule (20 mg) by mouth daily    Gastroesophageal reflux disease without esophagitis       SUMAtriptan 100 MG tablet    IMITREX    9 tablet    Take 1 tablet (100 mg) by mouth at onset of headache for migraine May repeat in 2 hours. Max 2 tablets/24 hours.    Migraine with aura and without status migrainosus, not intractable       venlafaxine 150 MG 24 hr capsule    EFFEXOR-XR    30 capsule    Take 1 capsule (150 mg) by mouth daily    Anxiety, Major depressive disorder, recurrent episode, moderate (H)

## 2018-03-27 ASSESSMENT — PATIENT HEALTH QUESTIONNAIRE - PHQ9: SUM OF ALL RESPONSES TO PHQ QUESTIONS 1-9: 7

## 2018-03-27 ASSESSMENT — ANXIETY QUESTIONNAIRES: GAD7 TOTAL SCORE: 7

## 2018-04-07 PROBLEM — F41.9 ANXIETY: Status: ACTIVE | Noted: 2018-04-07

## 2018-04-07 PROBLEM — F32.9 MAJOR DEPRESSIVE DISORDER: Status: ACTIVE | Noted: 2018-04-07

## 2018-05-21 DIAGNOSIS — F33.1 MAJOR DEPRESSIVE DISORDER, RECURRENT EPISODE, MODERATE (H): ICD-10-CM

## 2018-05-21 DIAGNOSIS — F41.9 ANXIETY: ICD-10-CM

## 2018-05-21 NOTE — TELEPHONE ENCOUNTER
"Requested Prescriptions   Pending Prescriptions Disp Refills     venlafaxine (EFFEXOR-XR) 150 MG 24 hr capsule 30 capsule 1     Sig: Take 1 capsule (150 mg) by mouth daily    Serotonin-Norepinephrine Reuptake Inhibitors  Failed    5/21/2018  1:55 PM       Failed - PHQ-9 score of less than 5 in past 6 months    Please review last PHQ-9 score.          Passed - Blood pressure under 140/90 in past 12 months    BP Readings from Last 3 Encounters:   03/26/18 130/86   02/27/18 (!) 138/97   02/26/18 112/80                Passed - Patient is age 18 or older       Passed - Normal serum creatinine on file in past 12 months    Recent Labs   Lab Test  02/27/18   0129   CR  1.16            Passed - Recent (6 mo) or future (30 days) visit within the authorizing provider's specialty    Patient had office visit in the last 6 months or has a visit in the next 30 days with authorizing provider or within the authorizing provider's specialty.  See \"Patient Info\" tab in inbasket, or \"Choose Columns\" in Meds & Orders section of the refill encounter.            Effexor      Last Written Prescription Date:  3/26/2018  Last Fill Quantity: 30,   # refills: 1  Last Office Visit: 3/26/2018  Future Office visit:           "

## 2018-05-22 RX ORDER — VENLAFAXINE HYDROCHLORIDE 150 MG/1
150 CAPSULE, EXTENDED RELEASE ORAL DAILY
Qty: 90 CAPSULE | Refills: 1 | Status: SHIPPED | OUTPATIENT
Start: 2018-05-22 | End: 2018-11-13

## 2018-05-22 NOTE — TELEPHONE ENCOUNTER
Chart reviewed.  Rx sent to pt's preferred pharmacy.    [id number] - [place], [place] - [address] [address]    Zhao Kirkland MD

## 2018-05-22 NOTE — TELEPHONE ENCOUNTER
Routing refill request to provider for review/approval because:  PHQ9 : 7      PHQ-9 SCORE 2/26/2018 3/26/2018   Total Score 13 7

## 2018-06-06 ENCOUNTER — TELEPHONE (OUTPATIENT)
Dept: FAMILY MEDICINE | Facility: CLINIC | Age: 38
End: 2018-06-06

## 2018-06-06 NOTE — TELEPHONE ENCOUNTER
Pt is due now to update PHQ9.  Please call pt. Follow up start date 6/26/18.  Follow up end date 10/26/18.   PHQ-9 SCORE 2/26/2018 3/26/2018   Total Score 13 7     Cabrera JIMÉNEZ CMA

## 2018-06-06 NOTE — LETTER
[facility]  [address]  [facility] [address] [phone]  June 28, 2018    Paco Ponce  [address] [facility] [address]    Dear Paco,    I care about your health and have reviewed your health plan. I have reviewed your medical conditions, medication list, and lab results and am making recommendations based on this review, to better manage your health.    You are in particular need of attention regarding:  -Depression    I am recommending that you:  -Please complete the attached questionnaire and mail it back in the enclosed envelope.    Here is a list of Health Maintenance topics that are due now or due soon:  Health Maintenance Due   Topic Date Due     DEPRESSION ACTION PLAN Q1 YR  05/23/1996     HIV SCREEN (SYSTEM ASSIGNED)  05/23/1996       Please call us at [phone] (or use [employer]) to address the above recommendations.     Thank you for trusting [facility] and we appreciate the opportunity to serve you.  We look forward to supporting your healthcare needs in the future.    Healthy Regards,    Zhao Kirkland MD

## 2018-06-26 NOTE — TELEPHONE ENCOUNTER
Spoke with patient. He advises that he is doing great on the increased dose of Effexor, however, did not have time to go through the PHQ-9 at the time of call. He states that he will try to call back today when he has time.  Mary Burrell RN, BSN  [facility]

## 2018-06-28 NOTE — TELEPHONE ENCOUNTER
Patient Contact    Attempt # 2    Was call answered?  No.  Left message on voicemail with information to call me back.    Routing to /MA pool to mail PHQ-9. Thank you.  Mary Burrell, RN, BSN  [facility]

## 2018-07-10 ASSESSMENT — ANXIETY QUESTIONNAIRES
6. BECOMING EASILY ANNOYED OR IRRITABLE: SEVERAL DAYS
7. FEELING AFRAID AS IF SOMETHING AWFUL MIGHT HAPPEN: NOT AT ALL
GAD7 TOTAL SCORE: 2
IF YOU CHECKED OFF ANY PROBLEMS ON THIS QUESTIONNAIRE, HOW DIFFICULT HAVE THESE PROBLEMS MADE IT FOR YOU TO DO YOUR WORK, TAKE CARE OF THINGS AT HOME, OR GET ALONG WITH OTHER PEOPLE: NOT DIFFICULT AT ALL
1. FEELING NERVOUS, ANXIOUS, OR ON EDGE: SEVERAL DAYS
2. NOT BEING ABLE TO STOP OR CONTROL WORRYING: NOT AT ALL
5. BEING SO RESTLESS THAT IT IS HARD TO SIT STILL: NOT AT ALL
3. WORRYING TOO MUCH ABOUT DIFFERENT THINGS: NOT AT ALL

## 2018-07-10 ASSESSMENT — PATIENT HEALTH QUESTIONNAIRE - PHQ9: 5. POOR APPETITE OR OVEREATING: NOT AT ALL

## 2018-07-11 ASSESSMENT — ANXIETY QUESTIONNAIRES: GAD7 TOTAL SCORE: 2

## 2018-07-11 ASSESSMENT — PATIENT HEALTH QUESTIONNAIRE - PHQ9: SUM OF ALL RESPONSES TO PHQ QUESTIONS 1-9: 2

## 2018-08-24 DIAGNOSIS — M54.50 ACUTE RIGHT-SIDED LOW BACK PAIN WITHOUT SCIATICA: ICD-10-CM

## 2018-08-24 NOTE — TELEPHONE ENCOUNTER
cyclobenzaprine (FLEXERIL) 10 MG tablet   Discontinued      Last Written Prescription Date:  11/30/2017  Last Fill Quantity: 30 tablet,   # refills: 0  Last Office Visit: 3/26/2018  Kirkland  Future Office visit:       Routing refill request to provider for review/approval because:  Drug not on the [facility], [facility] or [facility] refill protocol or controlled substance

## 2018-08-28 DIAGNOSIS — I10 HYPERTENSION GOAL BP (BLOOD PRESSURE) < 140/90: ICD-10-CM

## 2018-08-28 DIAGNOSIS — G43.109 MIGRAINE WITH AURA AND WITHOUT STATUS MIGRAINOSUS, NOT INTRACTABLE: ICD-10-CM

## 2018-08-28 RX ORDER — METOPROLOL SUCCINATE 50 MG/1
50 TABLET, EXTENDED RELEASE ORAL DAILY
Qty: 90 TABLET | Refills: 1 | Status: SHIPPED | OUTPATIENT
Start: 2018-08-28 | End: 2019-02-25

## 2018-08-28 RX ORDER — CYCLOBENZAPRINE HCL 10 MG
5-10 TABLET ORAL 3 TIMES DAILY PRN
Qty: 30 TABLET | Refills: 0 | Status: SHIPPED | OUTPATIENT
Start: 2018-08-28 | End: 2020-03-05

## 2018-08-28 NOTE — TELEPHONE ENCOUNTER
PHQ-9 now at goal.  Migraines better.  Chart reviewed.  Rx sent to pt's preferred pharmacy.    [id number] [place] [facility] [address] [place] [address] [address]    Zhao Kirkland MD

## 2018-08-28 NOTE — TELEPHONE ENCOUNTER
Routing refill request to provider for review/approval because:  Patient needs to be seen because:  Per last OV note, patient was due for follow up appointment at the end of April 2018, however, patient cancelled.  Mary Burrell RN, BSN  [facility]

## 2018-08-28 NOTE — TELEPHONE ENCOUNTER
"Requested Prescriptions   Pending Prescriptions Disp Refills     metoprolol succinate (TOPROL-XL) 50 MG 24 hr tablet  Last Written Prescription Date:  2/26/2018  Last Fill Quantity: 90 tablet,  # refills: 1   Last office visit: 3/26/2018 with prescribing provider:  Isael   Future Office Visit:       90 tablet 1     Sig: Take 1 tablet (50 mg) by mouth daily    Beta-Blockers Protocol Passed    8/28/2018  8:42 AM       Passed - Blood pressure under 140/90 in past 12 months    BP Readings from Last 3 Encounters:   03/26/18 130/86   02/27/18 (!) 138/97   02/26/18 112/80                Passed - Patient is age 6 or older       Passed - Recent (12 mo) or future (30 days) visit within the authorizing provider's specialty    Patient had office visit in the last 12 months or has a visit in the next 30 days with authorizing provider or within the authorizing provider's specialty.  See \"Patient Info\" tab in inbasket, or \"Choose Columns\" in Meds & Orders section of the refill encounter.              "

## 2018-11-13 DIAGNOSIS — F33.1 MAJOR DEPRESSIVE DISORDER, RECURRENT EPISODE, MODERATE (H): ICD-10-CM

## 2018-11-13 DIAGNOSIS — F41.9 ANXIETY: ICD-10-CM

## 2018-11-13 NOTE — TELEPHONE ENCOUNTER
"Requested Prescriptions   Pending Prescriptions Disp Refills     venlafaxine (EFFEXOR-XR) 150 MG 24 hr capsule  Last Written Prescription Date:  5/22/2018  Last Fill Quantity: 90 capsule,  # refills: 1   Last office visit: 3/26/2018 with prescribing provider:  Isael   Future Office Visit:       90 capsule 1     Sig: Take 1 capsule (150 mg) by mouth daily    Serotonin-Norepinephrine Reuptake Inhibitors  Failed    11/13/2018 10:01 AM       Failed - Recent (6 mo) or future (30 days) visit within the authorizing provider's specialty    Patient had office visit in the last 6 months or has a visit in the next 30 days with authorizing provider or within the authorizing provider's specialty.  See \"Patient Info\" tab in inbasket, or \"Choose Columns\" in Meds & Orders section of the refill encounter.           Passed - Blood pressure under 140/90 in past 12 months    BP Readings from Last 3 Encounters:   03/26/18 130/86   02/27/18 (!) 138/97   02/26/18 112/80            Passed - PHQ-9 score of less than 5 in past 6 months    Please review last PHQ-9 score.     PHQ-9 SCORE 2/26/2018 3/26/2018 7/10/2018   Total Score 13 7 2     MAAME-7 SCORE 2/26/2018 3/26/2018 7/10/2018   Total Score 13 7 2          Passed - Patient is age 18 or older       Passed - Normal serum creatinine on file in past 12 months    Recent Labs   Lab Test  02/27/18   0129   CR  1.16               "

## 2018-11-15 RX ORDER — VENLAFAXINE HYDROCHLORIDE 150 MG/1
150 CAPSULE, EXTENDED RELEASE ORAL DAILY
Qty: 90 CAPSULE | Refills: 1 | Status: SHIPPED | OUTPATIENT
Start: 2018-11-15 | End: 2019-05-13

## 2018-11-15 NOTE — TELEPHONE ENCOUNTER
Routing refill request to provider for review/approval because:  Patient needs to be seen because:  It has been more than 6 months since LOV.    IVAN RegaladoN, RN  [employer] [facility]

## 2018-11-15 NOTE — TELEPHONE ENCOUNTER
MAAME-7 and PHQ-9 from 7/10/18 show both anxiety and depression are under good control.  Will refill medication.    Chart reviewed.  Rx sent to pt's preferred pharmacy.    [id number] [place] [facility] [place] [address] [address]    Zhao Kirkland MD

## 2018-12-23 ENCOUNTER — OFFICE VISIT (OUTPATIENT)
Dept: URGENT CARE | Facility: URGENT CARE | Age: 38
End: 2018-12-23
Payer: COMMERCIAL

## 2018-12-23 VITALS
DIASTOLIC BLOOD PRESSURE: 80 MMHG | RESPIRATION RATE: 18 BRPM | TEMPERATURE: 98.9 F | OXYGEN SATURATION: 98 % | BODY MASS INDEX: 36.01 KG/M2 | SYSTOLIC BLOOD PRESSURE: 136 MMHG | WEIGHT: 265.5 LBS | HEART RATE: 87 BPM

## 2018-12-23 DIAGNOSIS — K52.9 GASTROENTERITIS: ICD-10-CM

## 2018-12-23 DIAGNOSIS — R52 BODY ACHES: Primary | ICD-10-CM

## 2018-12-23 DIAGNOSIS — G43.011 INTRACTABLE MIGRAINE WITHOUT AURA AND WITH STATUS MIGRAINOSUS: ICD-10-CM

## 2018-12-23 DIAGNOSIS — R50.9 FEBRILE ILLNESS, ACUTE: ICD-10-CM

## 2018-12-23 DIAGNOSIS — Z87.19 HISTORY OF DIVERTICULITIS: ICD-10-CM

## 2018-12-23 LAB
BASOPHILS # BLD AUTO: 0 10E9/L (ref 0–0.2)
BASOPHILS NFR BLD AUTO: 0.4 %
DIFFERENTIAL METHOD BLD: NORMAL
EOSINOPHIL # BLD AUTO: 0 10E9/L (ref 0–0.7)
EOSINOPHIL NFR BLD AUTO: 0.4 %
ERYTHROCYTE [DISTWIDTH] IN BLOOD BY AUTOMATED COUNT: 12.6 % (ref 10–15)
FLUAV+FLUBV AG SPEC QL: NEGATIVE
FLUAV+FLUBV AG SPEC QL: NEGATIVE
HCT VFR BLD AUTO: 42.2 % (ref 40–53)
HGB BLD-MCNC: 14.5 G/DL (ref 13.3–17.7)
LYMPHOCYTES # BLD AUTO: 1.1 10E9/L (ref 0.8–5.3)
LYMPHOCYTES NFR BLD AUTO: 16.3 %
MCH RBC QN AUTO: 30 PG (ref 26.5–33)
MCHC RBC AUTO-ENTMCNC: 34.4 G/DL (ref 31.5–36.5)
MCV RBC AUTO: 87 FL (ref 78–100)
MONOCYTES # BLD AUTO: 0.7 10E9/L (ref 0–1.3)
MONOCYTES NFR BLD AUTO: 9.7 %
NEUTROPHILS # BLD AUTO: 5.1 10E9/L (ref 1.6–8.3)
NEUTROPHILS NFR BLD AUTO: 73.2 %
PLATELET # BLD AUTO: 227 10E9/L (ref 150–450)
RBC # BLD AUTO: 4.84 10E12/L (ref 4.4–5.9)
SPECIMEN SOURCE: NORMAL
WBC # BLD AUTO: 7 10E9/L (ref 4–11)

## 2018-12-23 PROCEDURE — 87804 INFLUENZA ASSAY W/OPTIC: CPT | Performed by: FAMILY MEDICINE

## 2018-12-23 PROCEDURE — 99214 OFFICE O/P EST MOD 30 MIN: CPT | Performed by: FAMILY MEDICINE

## 2018-12-23 PROCEDURE — 85025 COMPLETE CBC W/AUTO DIFF WBC: CPT | Performed by: FAMILY MEDICINE

## 2018-12-23 PROCEDURE — 36415 COLL VENOUS BLD VENIPUNCTURE: CPT | Performed by: FAMILY MEDICINE

## 2018-12-23 RX ORDER — KETOROLAC TROMETHAMINE 30 MG/ML
60 INJECTION, SOLUTION INTRAMUSCULAR; INTRAVENOUS ONCE
Qty: 2 ML | Refills: 0
Start: 2018-12-23 | End: 2019-04-19

## 2018-12-23 RX ORDER — ONDANSETRON 4 MG/1
4 TABLET, FILM COATED ORAL ONCE
Qty: 1 TABLET | Refills: 0
Start: 2018-12-23 | End: 2019-04-19

## 2018-12-23 RX ORDER — KETOROLAC TROMETHAMINE 10 MG/1
10 TABLET, FILM COATED ORAL EVERY 6 HOURS PRN
Qty: 20 TABLET | Refills: 0 | Status: SHIPPED | OUTPATIENT
Start: 2018-12-23 | End: 2020-03-05

## 2018-12-23 NOTE — PATIENT INSTRUCTIONS
Patient Education     Food Poisoning or Viral Gastroenteritis (Adult)  You have a stomach illness that is likely either food poisoning or viral gastroenteritis.  Food poisoning is illness that is passed along in food and affects the stomach and intestinal tract. It usually occurs from 1 to 24 hours after eating food that has spoiled. When it happens within a few hours of eating, it is often caused by toxins from bacteria in food that has not been cooked or refrigerated properly.  Viral gastroenteritis is an illness from a virus that also affects the stomach and intestinal tract. Many people call it the  stomach flu,  but it has nothing to do with influenza. In fact, it can happen from food poisoning, but it can also happen when germs are passed from person-to-person or contaminated surface (toothbrush, cutting board, toilet) to a person.  Either illness can cause these symptoms:    Abdominal pain and cramping    Nausea    Vomiting    Diarrhea    Fever and chills    Loss of bowel control  The symptoms of food poisoning usually last 1 to 2 days. The symptoms of viral gastroenteritis can sometimes last up to 7 days but usually end sooner.  Antibiotics are not effective for either illness.  Other causes of gastroenteritis include bacteria and parasites which are not discussed here.  Home care  Follow all instructions given by your healthcare provider. Rest at home for the next 24 hours, or until you feel better. Avoid caffeine, tobacco, and alcohol. These can make diarrhea, cramping, and pain worse.  If taking medicines:    Over-the-counter diarrhea or nausea medicines are generally OK unless you have bleeding, fever, or severe abdominal pain.    You may use acetaminophen or NSAID medicines like ibuprofen or naproxen to reduce pain and fever. Don t use these if you have chronic liver or kidney disease, or ever had a stomach ulcer or gastrointestinal bleeding. Talk with your healthcare provider first. Don't use NSAID  medicines if you are already taking one for another condition (like arthritis) or are on daily aspirin therapy (such as for heart disease or after a stroke).  To prevent the spread of illness:    Remember that washing with soap and water is the best way to prevent the spread of infection. Wash your hands before and after caring for a sick person. Dry your hands with a single use towel.    Clean the toilet after each use.    Wash your hands or use alcohol-based hand  before eating.    Wash your hands or use alcohol-based hand  before and after preparing food. Keep in mind that people with diarrhea or vomiting should not prepare food for others.    Wash your hands or use alcohol-based hand  after using cutting boards, counter-tops, and knives (and other utensils) that have been in contact with raw foods.    Wash and then peel fruits and vegetables.    Keep uncooked meats away from cooked and ready-to-eat foods.    Use a food thermometer when cooking. Cook poultry to at least 165 F (74 C). Cook ground meat (beef, veal, pork, lamb) to at least 160 F (71 C). Cook fresh beef, veal, lamb, and pork to at least 145 F (63 C).    Don t eat raw or undercooked eggs (poached or monica side up), poultry, meat or unpasteurized milk and juices.  Food and drinks  The main goal while treating vomiting or diarrhea is to prevent dehydration. This is done by taking small amounts of liquids often.    Keep in mind that liquids are more important than food right now.    Drink only small amounts of liquids at a time.    Don t force yourself to eat, especially if you are having cramping, vomiting, or diarrhea. Don t eat large amounts at a time, even if you are hungry.    If you eat, avoid fatty, greasy, spicy, or fried foods.    Don t eat dairy foods or drink milk if you have diarrhea. These can make diarrhea worse.  The first 24 hours you can try:    Oral rehydration solutions, available at grocery stores and  pharmacies. Sports drinks are not a good choice if you are very dehydrated. They have too much sugar and not enough electrolytes.    Soft drinks without caffeine    Ginger ale    Water (plain or flavored)    Decaf tea or coffee    Clear broth, consommé, or bouillon    Gelatin, ice pops, or frozen fruit juice bars   The second 24 hours, if you are feeling better, you can add:    Hot cereal, plain toast, bread, rolls, or crackers    Plain noodles, rice, mashed potatoes, chicken noodle soup, or rice soup    Unsweetened canned fruit (no pineapple)    Bananas  As you recover:    Limit fat intake to less than 15 grams per day. Don t eat margarine, butter, oils, mayonnaise, sauces, gravies, fried foods, peanut butter, meat, poultry, or fish.    Limit fiber. Don t eat raw or cooked vegetables, fresh fruits except bananas, and bran cereals.    Limit caffeine and chocolate.    Don t use spices or seasonings except salt.    Resume a normal diet over time, as you feel better and your symptoms improve.    If the symptoms come back, go back to a simple diet or clear liquids.  Follow-up care  Follow up with your healthcare provider, or as advised. If a stool sample was taken or cultures were done, call the healthcare provider for the results as instructed.  Call 911  Call 911 if you have any of these symptoms:    Trouble breathing    Confusion    Extreme drowsiness or trouble walking    Loss of consciousness    Rapid heart rate    Chest pain    Stiff neck    Seizure   When to seek medical advice  Call your healthcare provider right away if any of these occur:    Abdominal pain that gets worse    Constant lower right abdominal pain    Continued vomiting and inability to keep liquids down    Diarrhea more than 5 times a day    Blood in vomit or stool    Dark urine or no urine for 8 hours, dry mouth and tongue, tiredness, weakness, or dizziness    New rash    You don t get better in 2 to 3 days    Fever of 100.4 F (38 C) or higher,  or as directed by your healthcare provider    You have new symptoms of arthritis  Date Last Reviewed: 6/1/2018 2000-2018 [employer]. [address] [place] [address], [place] [address]. All rights reserved. This information is not intended as a substitute for professional medical care. Always follow your healthcare professional's instructions.           Patient Education   About Migraine Headaches  What is a migraine headache?  A migraine is a very painful type of headache. It may last a few hours or days. During a migraine, you may have vision problems and feel sick to your stomach.  Migraines are three times more common in women than in men. Once they start, you may get them for the rest of your life. They may occur less often as you age.  What causes it?  We don't know the exact cause, but many things can trigger a migraine. These include:    Stress and anxiety    Lack of food or sleep    Foods and drinks that contain tyramine, such as:  ? Red caroline and some beers  ? Aged cheeses (like cheddar or blue cheese)  ? Yeast  ? Aged, dried or cured meats  ? Fermented foods like sauerkraut, soy sauce, miso and cinthya chi    Too much light    Chemicals (gasoline, cleaning products, perfume, glue, etc.)    Weather changes    Certain medicines    Hormone changes (in women).  What are symptoms?  Some people can tell when they're about to have a migraine. They may see flashing lights or zigzag lines in front of their eyes. Or they may lose their vision for a short time.  With a migraine, you may:    Feel pain or pulsing on one side of the head. For some people, the entire head hurts.    Be very sensitive to light and sound.    Feel nauseated (sick to your stomach) and vomit (throw up).  How is it treated?  Your care team may suggest medicine to prevent or relieve your symptoms. Once you start having migraines, you may also need medicine to keep them from getting worse.   Take your medicine at the first sign of a  migraine. It may take several tries to find a medicine that works for you.   When a migraine comes:    Lie down in a quiet, dark room. Try not to bend over, as this may cause more pain.    Put a cold pack on your head. Try a bag of frozen vegetables, wrapped with a thin cloth.    Drink extra fluids. If you can't drink, suck on ice chips.  How can I prevent migraines?  It will help to keep a migraine diary. By keeping a diary, you may find the cause of your headaches. Once you know the cause, you can take steps to prevent migraines in the future.  It also helps to live a healthy lifestyle. This means:    Get regular exercise. (If exercise triggers your headaches, tell your doctor.)    Drink plenty of water.    Eat healthy meals at regular times.    Try to go to bed and get up and regular times.    Don't smoke. Avoid second-hand smoke.    Avoid caffeine. Coffee, tea and soda may help a migraine. But if you drink them too often, they can cause migraines.    Find ways to relax, have fun and reduce stress in your life.    Try complementary therapies (yoga, acupuncture, massage, biofeedback, etc.).  When should I call my clinic?  Call your clinic at once if you have new or unusual symptoms, such as:     Pain that gets worse or lasts more than 24 hours.    Slurred speech or problems talking.    A weak arm or leg that you can't move normally.    A fever over 100 F (37.8 C), taken under the tongue.    Stiff neck.    Vomiting (throwing up) for several hours.  For more information about migraines  Contact the [employer] [place] [employer] Education (ACHE) at 1-476.758.6907 or www.headaches.org.  Local providers of complementary therapies  These services may not be covered by insurance. Check your insurance plan.  [facility]  [phone]   Includes pain education, behavioral therapy,   trigger point injections and more.  [facility]   [phone]   Includes acupuncture, massage,  myofascial release.  [facility]  [phone]  [email or web address]  Includes mindfulness, meditation, yoga.  Community Education     [place]: [email or web address]: commedprograms.spps.org  Look for programs on yoga, mindfulness, etc.  [facility]   [phone]  [email or web address]  Includes mindfulness, yoga, body-mind skills.  For informational purposes only. Not to replace the advice of your health care provider.   Copyright   2011 [facility]. All rights reserved. [employer] 675238 - 11/15.

## 2018-12-24 NOTE — PROGRESS NOTES
SUBJECTIVE:    Chief Complaint   Patient presents with     Urgent Care     Severe bodyaches started last night, fever at 100     Headache     Taking excedrine, out of toradol pills      Generalized Body Aches     HPI:  Paco Ponce is a 38 year old male who presents with sudden onset of febrile illness with nausea, abdominal pain, generalized muscle aches at midnight last night ,  No vomiting, no diarrhea,  He had past diverticulitis with surgical  removal of 2 ft of colon, and he is concerned that the diverticulitis has recurred    Pain is located in the generalized area, with radiation to None.  The pain is characterized as aching and cramping,   He continues with severe muscle aches  He developed a severe but typical migraine this morning , with sharp pain, photophobia, nausea  Pain at worst is a level 12 on a scale of 1-10.   No sore throat, no respiratory symptoms  EXACERBATING FACTORS: movement/walking  RELIEVING FACTORS: nothing.     Patient denies vomiting, diarrhea, constipation, bloating, melena, dysuria, frequency and hematuria     Last time the patient passed stool- yesterday, formed  Last time the patient urinated- today, no dysuria  Last time the patient was eating/ drinking -today, nauseated, but no vomiting    Surgical History :      Appendectomy   No  cholecystectomy    No        Past Medical History:   Diagnosis Date     Anxiety      Bowel obstruction (H)      Diverticulitis      Hypertension      Kidney stone      Major depressive disorder      Migraine      Obesity      Patient Active Problem List   Diagnosis     CARDIOVASCULAR SCREENING; LDL GOAL LESS THAN 160     Hypertension goal BP (blood pressure) < 140/90     Morbid obesity due to excess calories (H)     Tear of medial meniscus of left knee, current, unspecified tear type, subsequent encounter     Migraine with aura and without status migrainosus, not intractable     Kidney stone     Anxiety     Major depressive disorder        ALLERGIES:  Amoxicillin      Current Outpatient Medications on File Prior to Visit:  cholecalciferol (VITAMIN D3) 1000 UNIT tablet Take 1 tablet (1,000 Units) by mouth daily   cyclobenzaprine (FLEXERIL) 10 MG tablet Take 0.5-1 tablets (5-10 mg) by mouth 3 times daily as needed for muscle spasms   diphenhydrAMINE (BENADRYL) 25 MG tablet Take 1-2 tablets (25-50 mg) by mouth every 6 hours as needed for allergies   metoprolol succinate (TOPROL-XL) 50 MG 24 hr tablet Take 1 tablet (50 mg) by mouth daily   omeprazole (PRILOSEC) 20 MG CR capsule Take 1 capsule (20 mg) by mouth daily   SUMAtriptan (IMITREX) 100 MG tablet Take 1 tablet (100 mg) by mouth at onset of headache for migraine May repeat in 2 hours. Max 2 tablets/24 hours.   venlafaxine (EFFEXOR-XR) 150 MG 24 hr capsule Take 1 capsule (150 mg) by mouth daily     No current facility-administered medications on file prior to visit.     Social History     Tobacco Use     Smoking status: Never Smoker     Smokeless tobacco: Never Used   Substance Use Topics     Alcohol use: Yes     Alcohol/week: 0.0 oz     Comment: per wk 1-2     Family History:  Non-contributory,  No associated family health conditions    ROS:  CONSTITUTIONAL fever, chills,   INTEGUMENTARY/SKIN: NEGATIVE for worrisome rashes,    EYES: NEGATIVE for vision changes or irritation  ENT/MOUTH: NEGATIVE for ear, mouth and throat problems  RESP:NEGATIVE for significant cough or SOB    OBJECTIVE:  /80   Pulse 87   Temp 98.9  F (37.2  C) (Oral)   Resp 18   Wt 120.4 kg (265 lb 8 oz)   SpO2 98%   BMI 36.01 kg/m    GENERAL APPEARANCE: alert and severe distress-  Photophobic, covering his face, laying on exam table  EYES: EOMI,  PERRL, conjunctiva clear  HENT: ear canals and TM's normal.  Nose and mouth without ulcers, erythema or lesions  NECK: supple, nontender, no lymphadenopathy  RESP: lungs clear to auscultation - no rales, rhonchi or wheezes  CV: regular rates and rhythm, normal S1 S2, no  murmur noted  ABDOMEN: obese, soft, tenderness mild generalized, hyperactive bowel sounds  NEURO: Normal strength and tone, sensory exam grossly normal,  normal speech and mentation  SKIN: no suspicious lesions or rashes    After toradol injection/ ondansatron  - reported migraine pain 12/10 decreased to 2/10      Labs:      Results for orders placed or performed in visit on 12/23/18   CBC with platelets differential   Result Value Ref Range    WBC 7.0 4.0 - 11.0 10e9/L    RBC Count 4.84 4.4 - 5.9 10e12/L    Hemoglobin 14.5 13.3 - 17.7 g/dL    Hematocrit 42.2 40.0 - 53.0 %    MCV 87 78 - 100 fl    MCH 30.0 26.5 - 33.0 pg    MCHC 34.4 31.5 - 36.5 g/dL    RDW 12.6 10.0 - 15.0 %    Platelet Count 227 150 - 450 10e9/L    Diff Method Automated Method     % Neutrophils 73.2 %    % Lymphocytes 16.3 %    % Monocytes 9.7 %    % Eosinophils 0.4 %    % Basophils 0.4 %    Absolute Neutrophil 5.1 1.6 - 8.3 10e9/L    Absolute Lymphocytes 1.1 0.8 - 5.3 10e9/L    Absolute Monocytes 0.7 0.0 - 1.3 10e9/L    Absolute Eosinophils 0.0 0.0 - 0.7 10e9/L    Absolute Basophils 0.0 0.0 - 0.2 10e9/L   Influenza A/B antigen   Result Value Ref Range    Influenza A/B Agn Specimen Nasal     Influenza A Negative NEG^Negative    Influenza B Negative NEG^Negative            ASSESSMENT:  Febrile illness, acute  Body aches     - Influenza A/B antigen    Gastroenteritis    No respiratory illness, not typical for mono, influenza  With his active bowel sounds/ fevers/ myalgias suspect infection like norovirus      Diet: small amounts clear fluids frequently,soups,juices,water,advance diet as tolerated  Rest as much as possible.  Patient was advised to go to the ER if there is persistent vomiting and unable to keep down liquids  and/or severe weakness/ listlessness.     Follow-up with PCP if not improving  Advise careful hand washing to reduce the risk of passing the illness to others in close contact    Intractable migraine without aura and with status  migrainosus     - ketorolac (TORADOL) 60 MG/2ML injection; Inject 2 mLs (60 mg) into the muscle once for 1 dose  - ondansetron (ZOFRAN) 4 MG tablet; Take 1 tablet (4 mg) by mouth once for 1 dose  - ketorolac (TORADOL) 10 MG tablet; Take 1 tablet (10 mg) by mouth every 6 hours as needed for moderate pain-- for home use       History of diverticulitis     - CBC with platelets differential    With normal CBC- discussed that there is unlikely significant intraabdominal infection like appendicitis, cholecystitis or diverticulitis,        Patient was counselled that if the abdominal symptoms worsen, especially with worsening pain, associated fever, chills, and/or vomiting with inability to keep down foods and liquids, blood in the urine, stool or vomitus  that they should be re-evaluated in the Emergency Department.

## 2019-02-25 DIAGNOSIS — I10 HYPERTENSION GOAL BP (BLOOD PRESSURE) < 140/90: ICD-10-CM

## 2019-02-25 DIAGNOSIS — G43.109 MIGRAINE WITH AURA AND WITHOUT STATUS MIGRAINOSUS, NOT INTRACTABLE: ICD-10-CM

## 2019-02-25 RX ORDER — METOPROLOL SUCCINATE 50 MG/1
50 TABLET, EXTENDED RELEASE ORAL DAILY
Qty: 30 TABLET | Refills: 0 | Status: SHIPPED | OUTPATIENT
Start: 2019-02-25 | End: 2019-03-08

## 2019-02-25 NOTE — TELEPHONE ENCOUNTER
"Requested Prescriptions   Pending Prescriptions Disp Refills     metoprolol succinate ER (TOPROL-XL) 50 MG 24 hr tablet  Requesting a 90-Day Supply  Last Written Prescription Date:  8/28/2018  Last Fill Quantity: 90 tablet,  # refills: 1   Last office visit: 3/26/2018 with prescribing provider:  Isael     Future Office Visit:       90 tablet 1     Sig: Take 1 tablet (50 mg) by mouth daily    Beta-Blockers Protocol Passed - 2/25/2019 10:14 AM       Passed - Blood pressure under 140/90 in past 12 months    BP Readings from Last 3 Encounters:   12/23/18 136/80   03/26/18 130/86   02/27/18 (!) 138/97            Passed - Patient is age 6 or older       Passed - Recent (12 mo) or future (30 days) visit within the authorizing provider's specialty    Patient had office visit in the last 12 months or has a visit in the next 30 days with authorizing provider or within the authorizing provider's specialty.  See \"Patient Info\" tab in inbasket, or \"Choose Columns\" in Meds & Orders section of the refill encounter.             Passed - Medication is active on med list        "

## 2019-02-25 NOTE — TELEPHONE ENCOUNTER
Medication is being filled for 1 time refill only due to:  Patient needs to be seen because was due for follow up in April. Has been almost one year.     Will route to  to call patient to schedule office visit. Lizzy Orta R.N.

## 2019-02-27 DIAGNOSIS — I10 HYPERTENSION GOAL BP (BLOOD PRESSURE) < 140/90: ICD-10-CM

## 2019-02-27 DIAGNOSIS — G43.109 MIGRAINE WITH AURA AND WITHOUT STATUS MIGRAINOSUS, NOT INTRACTABLE: ICD-10-CM

## 2019-02-27 RX ORDER — METOPROLOL SUCCINATE 50 MG/1
TABLET, EXTENDED RELEASE ORAL
Qty: 90 TABLET | Refills: 1
Start: 2019-02-27

## 2019-02-27 NOTE — TELEPHONE ENCOUNTER
"Requested Prescriptions   Pending Prescriptions Disp Refills     metoprolol succinate ER (TOPROL-XL) 50 MG 24 hr tablet [Pharmacy Med Name: METOPROLOL SUCC ER 50 MG TAB]  Requesting a 90-Day Supply  See 2/25/2019 Refill Request  Last Written Prescription Date:  2/25/2019  Last Fill Quantity: 30 tablet,  # refills: 0   Last office visit: 3/26/2018 with prescribing provider:  Isael     Future Office Visit:       90 tablet 1     Sig: TAKE 1 TABLET BY MOUTH EVERY DAY    Beta-Blockers Protocol Passed - 2/27/2019  1:21 AM       Passed - Blood pressure under 140/90 in past 12 months    BP Readings from Last 3 Encounters:   12/23/18 136/80   03/26/18 130/86   02/27/18 (!) 138/97                Passed - Patient is age 6 or older       Passed - Recent (12 mo) or future (30 days) visit within the authorizing provider's specialty    Patient had office visit in the last 12 months or has a visit in the next 30 days with authorizing provider or within the authorizing provider's specialty.  See \"Patient Info\" tab in inbasket, or \"Choose Columns\" in Meds & Orders section of the refill encounter.             Passed - Medication is active on med list        "

## 2019-02-27 NOTE — TELEPHONE ENCOUNTER
Refill denied, patient was due to follow up 10 months ago per last OV notes. Had appointment but was cancelled. Rx Maty sent on 2/25/19 -- 2 days ago. Patient has been called and did not make appointment at that time but informed us he will  call back. Lizzy Orta R.N.

## 2019-02-27 NOTE — TELEPHONE ENCOUNTER
Called [phone] and spoke with pt.  He is at work and needs to check his schedule.  He will call back to make an appt.  Kendra Pineda

## 2019-03-07 DIAGNOSIS — I10 HYPERTENSION GOAL BP (BLOOD PRESSURE) < 140/90: ICD-10-CM

## 2019-03-07 DIAGNOSIS — G43.109 MIGRAINE WITH AURA AND WITHOUT STATUS MIGRAINOSUS, NOT INTRACTABLE: ICD-10-CM

## 2019-03-07 NOTE — LETTER
[facility]  [address]  [facility] [place] [address]  [phone]  March 18, 2019    Paco Ponce  [address] [facility] [address]    Dear Paco,    I care about your health and have reviewed your health plan. I have reviewed your medical conditions, medication list, and lab results and am making recommendations based on this review, to better manage your health.    You are in particular need of attention regarding:  -Medication(s)     I am recommending that you:  -  We were able to refill the medication you need for a one time kelly refill, but we will need to see you in the office before we can provide any further refills.  Please call [phone] to schedule an appointment at your earliest convenience.    Thank you for trusting [facility] and we appreciate the opportunity to serve you.  We look forward to supporting your healthcare needs in the future.    Healthy Regards,    Zhao Kirkland Jr, MD

## 2019-03-07 NOTE — TELEPHONE ENCOUNTER
"Requested Prescriptions   Pending Prescriptions Disp Refills     metoprolol succinate ER (TOPROL-XL) 50 MG 24 hr tablet  Last Written Prescription Date:  2/25/2019  Last Fill Quantity: 30 tablet,  # refills: 0   Last Office Visit: 3/26/2018   Future Office Visit:      30 tablet 0     Sig: Take 1 tablet (50 mg) by mouth daily    Beta-Blockers Protocol Passed - 3/7/2019  3:23 PM       Passed - Blood pressure under 140/90 in past 12 months    BP Readings from Last 3 Encounters:   12/23/18 136/80   03/26/18 130/86   02/27/18 (!) 138/97            Passed - Patient is age 6 or older       Passed - Recent (12 mo) or future (30 days) visit within the authorizing provider's specialty    Patient had office visit in the last 12 months or has a visit in the next 30 days with authorizing provider or within the authorizing provider's specialty.  See \"Patient Info\" tab in inbasket, or \"Choose Columns\" in Meds & Orders section of the refill encounter.             Passed - Medication is active on med list          "

## 2019-03-08 RX ORDER — METOPROLOL SUCCINATE 50 MG/1
50 TABLET, EXTENDED RELEASE ORAL DAILY
Qty: 30 TABLET | Refills: 0 | Status: SHIPPED | OUTPATIENT
Start: 2019-03-08 | End: 2019-04-30

## 2019-03-08 NOTE — TELEPHONE ENCOUNTER
A 30 day supply is given, patient is due for an office visit.  Please call to  assist the patient in scheduling an appointment.  KENISHA Regalado, RN  [employer] [facility]

## 2019-03-18 NOTE — TELEPHONE ENCOUNTER
Pt has been notified for past refills needs to be seen. Letter sent to pt. Jocelyn Azevedo CMA

## 2019-04-19 ENCOUNTER — TELEPHONE (OUTPATIENT)
Dept: FAMILY MEDICINE | Facility: CLINIC | Age: 39
End: 2019-04-19

## 2019-04-19 ENCOUNTER — OFFICE VISIT (OUTPATIENT)
Dept: FAMILY MEDICINE | Facility: CLINIC | Age: 39
End: 2019-04-19
Payer: COMMERCIAL

## 2019-04-19 VITALS
OXYGEN SATURATION: 98 % | TEMPERATURE: 101.6 F | HEART RATE: 106 BPM | WEIGHT: 270 LBS | SYSTOLIC BLOOD PRESSURE: 136 MMHG | DIASTOLIC BLOOD PRESSURE: 88 MMHG | RESPIRATION RATE: 18 BRPM | HEIGHT: 72 IN | BODY MASS INDEX: 36.57 KG/M2

## 2019-04-19 DIAGNOSIS — R52 BODY ACHES: Primary | ICD-10-CM

## 2019-04-19 DIAGNOSIS — F32.9 MAJOR DEPRESSIVE DISORDER: ICD-10-CM

## 2019-04-19 DIAGNOSIS — R30.0 DYSURIA: ICD-10-CM

## 2019-04-19 LAB
ALBUMIN UR-MCNC: ABNORMAL MG/DL
AMORPH CRY #/AREA URNS HPF: ABNORMAL /HPF
APPEARANCE UR: CLEAR
BILIRUB UR QL STRIP: NEGATIVE
COLOR UR AUTO: YELLOW
FLUAV+FLUBV AG SPEC QL: NEGATIVE
FLUAV+FLUBV AG SPEC QL: NEGATIVE
GLUCOSE UR STRIP-MCNC: NEGATIVE MG/DL
HGB UR QL STRIP: NEGATIVE
KETONES UR STRIP-MCNC: NEGATIVE MG/DL
LEUKOCYTE ESTERASE UR QL STRIP: NEGATIVE
NITRATE UR QL: NEGATIVE
PH UR STRIP: 6.5 PH (ref 5–7)
RBC #/AREA URNS AUTO: ABNORMAL /HPF
SOURCE: ABNORMAL
SP GR UR STRIP: 1.02 (ref 1–1.03)
SPECIMEN SOURCE: NORMAL
UROBILINOGEN UR STRIP-ACNC: 0.2 EU/DL (ref 0.2–1)
WBC #/AREA URNS AUTO: ABNORMAL /HPF

## 2019-04-19 PROCEDURE — 81001 URINALYSIS AUTO W/SCOPE: CPT | Performed by: FAMILY MEDICINE

## 2019-04-19 PROCEDURE — 99213 OFFICE O/P EST LOW 20 MIN: CPT | Mod: 25 | Performed by: FAMILY MEDICINE

## 2019-04-19 PROCEDURE — 96372 THER/PROPH/DIAG INJ SC/IM: CPT | Performed by: FAMILY MEDICINE

## 2019-04-19 PROCEDURE — 87804 INFLUENZA ASSAY W/OPTIC: CPT | Performed by: FAMILY MEDICINE

## 2019-04-19 RX ORDER — KETOROLAC TROMETHAMINE 30 MG/ML
60 INJECTION, SOLUTION INTRAMUSCULAR; INTRAVENOUS ONCE
Status: COMPLETED | OUTPATIENT
Start: 2019-04-19 | End: 2019-04-19

## 2019-04-19 RX ORDER — KETOROLAC TROMETHAMINE 30 MG/ML
60 INJECTION, SOLUTION INTRAMUSCULAR; INTRAVENOUS ONCE
Qty: 2 ML | Refills: 0 | OUTPATIENT
Start: 2019-04-19 | End: 2019-04-30

## 2019-04-19 RX ADMIN — KETOROLAC TROMETHAMINE 60 MG: 30 INJECTION, SOLUTION INTRAMUSCULAR; INTRAVENOUS at 13:25

## 2019-04-19 ASSESSMENT — PATIENT HEALTH QUESTIONNAIRE - PHQ9
SUM OF ALL RESPONSES TO PHQ QUESTIONS 1-9: 2
5. POOR APPETITE OR OVEREATING: NOT AT ALL

## 2019-04-19 ASSESSMENT — MIFFLIN-ST. JEOR: SCORE: 2172.71

## 2019-04-19 ASSESSMENT — ANXIETY QUESTIONNAIRES
7. FEELING AFRAID AS IF SOMETHING AWFUL MIGHT HAPPEN: NOT AT ALL
IF YOU CHECKED OFF ANY PROBLEMS ON THIS QUESTIONNAIRE, HOW DIFFICULT HAVE THESE PROBLEMS MADE IT FOR YOU TO DO YOUR WORK, TAKE CARE OF THINGS AT HOME, OR GET ALONG WITH OTHER PEOPLE: NOT DIFFICULT AT ALL
GAD7 TOTAL SCORE: 3
6. BECOMING EASILY ANNOYED OR IRRITABLE: SEVERAL DAYS
2. NOT BEING ABLE TO STOP OR CONTROL WORRYING: SEVERAL DAYS
3. WORRYING TOO MUCH ABOUT DIFFERENT THINGS: NOT AT ALL
5. BEING SO RESTLESS THAT IT IS HARD TO SIT STILL: NOT AT ALL
1. FEELING NERVOUS, ANXIOUS, OR ON EDGE: SEVERAL DAYS

## 2019-04-19 NOTE — PROGRESS NOTES
SUBJECTIVE:   Paco Ponce is a 38 year old male who presents to clinic today for the following   health issues:        Headache       Duration: 2 days     Description (location/character/radiation): headache, body aches, fever      Intensity:  moderate    Accompanying signs and symptoms: none     History (similar episodes/previous evaluation): None    Precipitating or alleviating factors: None    Therapies tried and outcome: Toradol, Excedrin        Additional history: as documented    Reviewed  and updated as needed this visit by clinical staff         Reviewed and updated as needed this visit by Provider         Patient Active Problem List   Diagnosis     CARDIOVASCULAR SCREENING; LDL GOAL LESS THAN 160     Hypertension goal BP (blood pressure) < 140/90     Morbid obesity due to excess calories (H)     Tear of medial meniscus of left knee, current, unspecified tear type, subsequent encounter     Migraine with aura and without status migrainosus, not intractable     Kidney stone     Anxiety     Major depressive disorder     Past Surgical History:   Procedure Laterality Date     APPENDECTOMY       ARTHROSCOPY KNEE Left 02/01/2017    Procedure: Left knee arthroscopy and partial medial meniscectomy. Surgeon:  Yordan Main MD  Location: [facility]     ENT SURGERY       HERNIA REPAIR         Social History     Tobacco Use     Smoking status: Never Smoker     Smokeless tobacco: Never Used   Substance Use Topics     Alcohol use: Yes     Alcohol/week: 0.0 oz     Comment: per wk 1-2     No family history on file.      Current Outpatient Medications   Medication Sig Dispense Refill     cyclobenzaprine (FLEXERIL) 10 MG tablet Take 0.5-1 tablets (5-10 mg) by mouth 3 times daily as needed for muscle spasms 30 tablet 0     ketorolac (TORADOL) 10 MG tablet Take 1 tablet (10 mg) by mouth every 6 hours as needed for moderate pain 20 tablet 0     metoprolol succinate ER (TOPROL-XL) 50 MG 24 hr tablet Take 1  tablet (50 mg) by mouth daily 30 tablet 0     omeprazole (PRILOSEC) 20 MG CR capsule Take 1 capsule (20 mg) by mouth daily 90 capsule 1     venlafaxine (EFFEXOR-XR) 150 MG 24 hr capsule Take 1 capsule (150 mg) by mouth daily 90 capsule 1     cholecalciferol (VITAMIN D3) 1000 UNIT tablet Take 1 tablet (1,000 Units) by mouth daily (Patient not taking: Reported on 4/19/2019) 100 tablet 3     diphenhydrAMINE (BENADRYL) 25 MG tablet Take 1-2 tablets (25-50 mg) by mouth every 6 hours as needed for allergies (Patient not taking: Reported on 4/19/2019) 56 tablet 0     SUMAtriptan (IMITREX) 100 MG tablet Take 1 tablet (100 mg) by mouth at onset of headache for migraine May repeat in 2 hours. Max 2 tablets/24 hours. (Patient not taking: Reported on 4/19/2019) 9 tablet 1     Allergies   Allergen Reactions     Amoxicillin Anaphylaxis     Recent Labs   Lab Test 02/27/18  0129 02/05/18  0859  02/29/16  0844   LDL  --   --   --  106*   HDL  --   --   --  34*   TRIG  --   --   --  271*   ALT 28 28  --   --    CR 1.16 1.08   < >  --    GFRESTIMATED 70 76   < >  --    GFRESTBLACK 84 >90   < >  --    POTASSIUM 3.8 4.3   < >  --    TSH  --  2.96  --   --     < > = values in this interval not displayed.      BP Readings from Last 3 Encounters:   04/19/19 136/88   12/23/18 136/80   03/26/18 130/86    Wt Readings from Last 3 Encounters:   04/19/19 122.5 kg (270 lb)   12/23/18 120.4 kg (265 lb 8 oz)   03/26/18 111.6 kg (246 lb)                    ROS:  Constitutional, HEENT, cardiovascular, pulmonary, gi and gu systems are negative, except as otherwise noted.    OBJECTIVE:     /88 (Cuff Size: Adult Large)   Pulse 106   Temp 101.6  F (38.7  C) (Tympanic)   Resp 18   Ht 1.829 m (6')   Wt 122.5 kg (270 lb)   SpO2 98%   BMI 36.62 kg/m    Body mass index is 36.62 kg/m .  GENERAL: healthy, alert and no distress  EYES: Eyes grossly normal to inspection, PERRL and conjunctivae and sclerae normal  HENT: ear canals and TM's normal, nose  and mouth without ulcers or lesions  NECK: no adenopathy, no asymmetry, masses, or scars and thyroid normal to palpation  RESP: lungs clear to auscultation - no rales, rhonchi or wheezes  CV: regular rate and rhythm, normal S1 S2, no S3 or S4, no murmur, click or rub, no peripheral edema and peripheral pulses strong  ABDOMEN: soft, nontender, no hepatosplenomegaly, no masses and bowel sounds normal  MS: no gross musculoskeletal defects noted, no edema  SKIN: no suspicious lesions or rashes  NEURO: Normal strength and tone, mentation intact and speech normal  BACK: no CVA tenderness, no paralumbar tenderness  PSYCH: mentation appears normal, affect normal/bright  LYMPH: no cervical, supraclavicular, axillary, or inguinal adenopathy        ASSESSMENT/PLAN:   Paco was seen today for headache.    Diagnoses and all orders for this visit:    Body aches  -     ketorolac (TORADOL) 60 MG/2ML injection; Inject 2 mLs (60 mg) into the muscle once for 1 dose  -     ketorolac (TORADOL) injection 60 mg    Fever  -     Influenza A/B antigen    Dysuria  -     UA reflex to Microscopic and Culture  -     ketorolac (TORADOL) 60 MG/2ML injection; Inject 2 mLs (60 mg) into the muscle once for 1 dose  -     ketorolac (TORADOL) injection 60 mg      Has body ache with fever, has dysuria and burning sensation   UA showed no abnormal finding except cast related with dehydration   Encouraged him to keep working on hydration and watching sx closely   If not improving within next 1-2 days, he must be rechecked at ER. Pt acknowledged and agreed with the plan       Lauri Gan MD  [facility] [place]

## 2019-04-19 NOTE — TELEPHONE ENCOUNTER
Has migraine for 2 days, has gotten Toradol injections in the past and toradol pills but they don't work as well. Says he has a significant history of migraines. He has now had maigraine for 2 days--medication is not knocking it down.  I advised him he needs to be seen. We do not have any openings nor does [place] today. I have scheduled him with Dr. Gan in  today at 1 pm. Lizzy Orta R.N.

## 2019-04-20 ASSESSMENT — ANXIETY QUESTIONNAIRES: GAD7 TOTAL SCORE: 3

## 2019-04-30 ENCOUNTER — OFFICE VISIT (OUTPATIENT)
Dept: FAMILY MEDICINE | Facility: CLINIC | Age: 39
End: 2019-04-30
Payer: COMMERCIAL

## 2019-04-30 VITALS
HEART RATE: 84 BPM | DIASTOLIC BLOOD PRESSURE: 94 MMHG | SYSTOLIC BLOOD PRESSURE: 150 MMHG | OXYGEN SATURATION: 96 % | BODY MASS INDEX: 36.48 KG/M2 | WEIGHT: 269 LBS | TEMPERATURE: 98.4 F

## 2019-04-30 DIAGNOSIS — E66.01 MORBID OBESITY DUE TO EXCESS CALORIES (H): ICD-10-CM

## 2019-04-30 DIAGNOSIS — I10 HYPERTENSION GOAL BP (BLOOD PRESSURE) < 140/90: Primary | ICD-10-CM

## 2019-04-30 PROCEDURE — 36415 COLL VENOUS BLD VENIPUNCTURE: CPT | Performed by: FAMILY MEDICINE

## 2019-04-30 PROCEDURE — 99213 OFFICE O/P EST LOW 20 MIN: CPT | Performed by: FAMILY MEDICINE

## 2019-04-30 PROCEDURE — 80048 BASIC METABOLIC PNL TOTAL CA: CPT | Performed by: FAMILY MEDICINE

## 2019-04-30 RX ORDER — HYDROCHLOROTHIAZIDE 12.5 MG/1
25 TABLET ORAL DAILY
Qty: 30 TABLET | Refills: 0 | Status: SHIPPED | OUTPATIENT
Start: 2019-04-30 | End: 2019-05-14 | Stop reason: DRUGHIGH

## 2019-04-30 NOTE — LETTER
May 2, 2019      Paco Ponce  [address] [place] [address]        Dear ,    We are writing to inform you of your test results.    -Kidney function (GFR) is normal.   -Sodium is normal.   -Potassium is normal.   -Calcium is normal.   -Glucose is mildly elevated but you were nonfasting and this is okay.    Resulted Orders   Basic metabolic panel   Result Value Ref Range    Sodium 140 133 - 144 mmol/L    Potassium 4.4 3.4 - 5.3 mmol/L    Chloride 105 94 - 109 mmol/L    Carbon Dioxide 25 20 - 32 mmol/L    Anion Gap 10 3 - 14 mmol/L    Glucose 133 (H) 70 - 99 mg/dL      Comment:      Non Fasting    Urea Nitrogen 13 7 - 30 mg/dL    Creatinine 1.28 (H) 0.66 - 1.25 mg/dL    GFR Estimate 69 >60 mL/min/[1.73_m2]      Comment:      Non  GFR Calc  Starting 12/18/2018, serum creatinine based estimated GFR (eGFR) will be   calculated using the Chronic Kidney Disease Epidemiology Collaboration   (CKD-EPI) equation.      GFR Estimate If Black 80 >60 mL/min/[1.73_m2]      Comment:       GFR Calc  Starting 12/18/2018, serum creatinine based estimated GFR (eGFR) will be   calculated using the Chronic Kidney Disease Epidemiology Collaboration   (CKD-EPI) equation.      Calcium 9.4 8.5 - 10.1 mg/dL       If you have any questions or concerns, please call the clinic at the number listed above.       Sincerely,        Douglas Longo, DO

## 2019-04-30 NOTE — PROGRESS NOTES
SUBJECTIVE:   Paco Ponce is a 38 year old male who presents to clinic today for the following   health issues:      Hypertension Follow-up      Outpatient blood pressures are not being checked.    Low Salt Diet: no added salt      Amount of exercise or physical activity: None    Problems taking medications regularly: No    Medication side effects: no    Diet: regular (no restrictions)    Current medication includes Toprol XL 50 mg daily    Feels like his BP has been higher. Dealing with migraines here and there.     He does get migraine headaches. Otherwise, no lightheadedness, dizziness, vision changes, chest pain, shortnes of breath, or dyspnea.    Additional history: as documented    Reviewed  and updated as needed this visit by clinical staff  Tobacco  Allergies  Meds  Problems  Med Hx  Surg Hx  Fam Hx         Reviewed and updated as needed this visit by Provider  Tobacco  Allergies  Meds  Problems  Med Hx  Surg Hx  Fam Hx         Patient Active Problem List   Diagnosis     CARDIOVASCULAR SCREENING; LDL GOAL LESS THAN 160     Hypertension goal BP (blood pressure) < 140/90     Morbid obesity due to excess calories (H)     Tear of medial meniscus of left knee, current, unspecified tear type, subsequent encounter     Migraine with aura and without status migrainosus, not intractable     Kidney stone     Anxiety     Major depressive disorder     Past Surgical History:   Procedure Laterality Date     APPENDECTOMY       ARTHROSCOPY KNEE Left 02/01/2017    Procedure: Left knee arthroscopy and partial medial meniscectomy. Surgeon:  Yordan Main MD  Location: [facility]     ENT SURGERY       HERNIA REPAIR         Social History     Tobacco Use     Smoking status: Never Smoker     Smokeless tobacco: Never Used   Substance Use Topics     Alcohol use: Yes     Alcohol/week: 0.0 oz     Comment: per wk 1-2     History reviewed. No pertinent family history.        ROS:  Constitutional,  HEENT, cardiovascular, pulmonary, gi and gu systems are negative, except as otherwise noted.    OBJECTIVE:     BP (!) 150/94   Pulse 84   Temp 98.4  F (36.9  C) (Oral)   Wt 122 kg (269 lb)   SpO2 96%   BMI 36.48 kg/m    Body mass index is 36.48 kg/m .  GENERAL: healthy, alert and no distress  RESP: lungs clear to auscultation - no rales, rhonchi or wheezes  CV: regular rate and rhythm, normal S1 S2, no S3 or S4, no murmur, click or rub, no peripheral edema and peripheral pulses strong  MS: no gross musculoskeletal defects noted, no edema  PSYCH: mentation appears normal, affect normal/bright    Diagnostic Test Results:  none     ASSESSMENT/PLAN:   1. Hypertension goal BP (blood pressure) < 140/90: he doesn't feel like metoprolol has been very helpful. Will change medication to hydrochlorothiazide and have him follow up to recheck blood pressure in 2 weeks. Adjustmedication as needed. Also recheck labs today.   - Basic metabolic panel  - hydrochlorothiazide (HYDRODIURIL) 12.5 MG tablet; Take 2 tablets (25 mg) by mouth daily  Dispense: 30 tablet; Refill: 0  - RN HTN MGMT    2. Morbid obesity due to excess calories (H): emphasized diet and exercise recommendations to helpf facilitate weight loss and also improve blood pressure -- mediterranean diet, low sodium.    Douglas Longo,   [facility] ASIA

## 2019-05-01 LAB
ANION GAP SERPL CALCULATED.3IONS-SCNC: 10 MMOL/L (ref 3–14)
BUN SERPL-MCNC: 13 MG/DL (ref 7–30)
CALCIUM SERPL-MCNC: 9.4 MG/DL (ref 8.5–10.1)
CHLORIDE SERPL-SCNC: 105 MMOL/L (ref 94–109)
CO2 SERPL-SCNC: 25 MMOL/L (ref 20–32)
CREAT SERPL-MCNC: 1.28 MG/DL (ref 0.66–1.25)
GFR SERPL CREATININE-BSD FRML MDRD: 69 ML/MIN/{1.73_M2}
GLUCOSE SERPL-MCNC: 133 MG/DL (ref 70–99)
POTASSIUM SERPL-SCNC: 4.4 MMOL/L (ref 3.4–5.3)
SODIUM SERPL-SCNC: 140 MMOL/L (ref 133–144)

## 2019-05-03 DIAGNOSIS — G43.109 MIGRAINE WITH AURA AND WITHOUT STATUS MIGRAINOSUS, NOT INTRACTABLE: ICD-10-CM

## 2019-05-03 DIAGNOSIS — I10 HYPERTENSION GOAL BP (BLOOD PRESSURE) < 140/90: ICD-10-CM

## 2019-05-03 NOTE — TELEPHONE ENCOUNTER
"Requested Prescriptions   Pending Prescriptions Disp Refills     metoprolol succinate ER (TOPROL-XL) 50 MG 24 hr tablet [Pharmacy Med Name: METOPROLOL SUCC ER 50 MG TAB] 30 tablet 0     Sig: TAKE 1 TABLET BY MOUTH EVERY DAY. DUE FOR OFFICE VISIT FOR MORE REFILLS  Last Written Prescription Date:  0  Last Fill Quantity: 0,  # refills: 0   Last Office Visit: 4/30/2019 Qing  Future Office Visit:    Next 5 appointments (look out 90 days)    May 14, 2019  4:00 PM CDT  Nurse Only with  ANTICOAGULATION CLINIC  [facility] [address] [facility] [address]  [phone]                Beta-Blockers Protocol Failed - 5/3/2019  1:23 AM        Failed - Blood pressure under 140/90 in past 12 months     BP Readings from Last 3 Encounters:   04/30/19 (!) 150/94   04/19/19 136/88   12/23/18 136/80             Failed - Medication is active on med list        Passed - Patient is age 6 or older        Passed - Recent (12 mo) or future (30 days) visit within the authorizing provider's specialty     Patient had office visit in the last 12 months or has a visit in the next 30 days with authorizing provider or within the authorizing provider's specialty.  See \"Patient Info\" tab in inbasket, or \"Choose Columns\" in Meds & Orders section of the refill encounter.                "

## 2019-05-06 RX ORDER — METOPROLOL SUCCINATE 50 MG/1
TABLET, EXTENDED RELEASE ORAL
Qty: 30 TABLET | Refills: 0
Start: 2019-05-06

## 2019-05-13 DIAGNOSIS — F41.9 ANXIETY: ICD-10-CM

## 2019-05-13 DIAGNOSIS — F33.1 MAJOR DEPRESSIVE DISORDER, RECURRENT EPISODE, MODERATE (H): ICD-10-CM

## 2019-05-13 RX ORDER — VENLAFAXINE HYDROCHLORIDE 150 MG/1
150 CAPSULE, EXTENDED RELEASE ORAL DAILY
Qty: 90 CAPSULE | Refills: 1 | Status: SHIPPED | OUTPATIENT
Start: 2019-05-13 | End: 2019-11-04

## 2019-05-13 NOTE — TELEPHONE ENCOUNTER
"Requested Prescriptions   Pending Prescriptions Disp Refills     venlafaxine (EFFEXOR-XR) 150 MG 24 hr capsule [Pharmacy Med Name: VENLAFAXINE HCL  MG CAP]  Last Written Prescription Date:  11/15/2018  Last Fill Quantity: 90 capsule,  # refills: 1   Last office visit: 4/30/2019 with prescribing provider:  Qing     Future Office Visit:   Next 5 appointments (look out 90 days)    May 14, 2019  4:00 PM CDT  Nurse Only with  ANTICOAGULATION CLINIC  [facility] ([facility]) [address]  [facility] [place] [id number] [phone]            90 capsule 1     Sig: TAKE 1 CAPSULE (150 MG) BY MOUTH DAILY       Serotonin-Norepinephrine Reuptake Inhibitors  Failed - 5/13/2019  1:24 AM        Failed - Blood pressure under 140/90 in past 12 months     BP Readings from Last 3 Encounters:   04/30/19 (!) 150/94   04/19/19 136/88   12/23/18 136/80             Failed - Normal serum creatinine on file in past 12 months     Recent Labs   Lab Test 04/30/19  1458   CR 1.28*             Passed - PHQ-9 score of less than 5 in past 6 months     Please review last PHQ-9 score.     PHQ-9 SCORE 3/26/2018 7/10/2018 4/19/2019   PHQ-9 Total Score 7 2 2     MAAME-7 SCORE 3/26/2018 7/10/2018 4/19/2019   Total Score 7 2 3           Passed - Medication is active on med list        Passed - Patient is age 18 or older        Passed - Recent (6 mo) or future (30 days) visit within the authorizing provider's specialty     Patient had office visit in the last 6 months or has a visit in the next 30 days with authorizing provider or within the authorizing provider's specialty.  See \"Patient Info\" tab in inbasket, or \"Choose Columns\" in Meds & Orders section of the refill encounter.            "

## 2019-05-13 NOTE — TELEPHONE ENCOUNTER
Creatinine only modestly elevated and blood pressure getting rechecked tomorrow.  PHQ-9 and MAAME-7 both at goal.  Chart reviewed.  Rx sent to pt's preferred pharmacy.    [facility] [id number] [place] [facility] [place] [address] [address]    Zhao Kirkland MD

## 2019-05-13 NOTE — TELEPHONE ENCOUNTER
Routing refill request to provider for review/approval because:  Labs out of range:  BP, creat.  Will be rechecking BP tomorrow.   Lizzy Orta R.N.

## 2019-05-14 ENCOUNTER — ALLIED HEALTH/NURSE VISIT (OUTPATIENT)
Dept: NURSING | Facility: CLINIC | Age: 39
End: 2019-05-14
Payer: COMMERCIAL

## 2019-05-14 VITALS — HEART RATE: 102 BPM | OXYGEN SATURATION: 99 % | DIASTOLIC BLOOD PRESSURE: 100 MMHG | SYSTOLIC BLOOD PRESSURE: 148 MMHG

## 2019-05-14 DIAGNOSIS — I10 HYPERTENSION GOAL BP (BLOOD PRESSURE) < 140/90: Primary | ICD-10-CM

## 2019-05-14 RX ORDER — LISINOPRIL 10 MG/1
10 TABLET ORAL DAILY
Qty: 90 TABLET | Refills: 0 | Status: SHIPPED | OUTPATIENT
Start: 2019-05-14 | End: 2019-08-09

## 2019-05-14 RX ORDER — HYDROCHLOROTHIAZIDE 25 MG/1
25 TABLET ORAL DAILY
Qty: 90 TABLET | Refills: 0 | Status: SHIPPED | OUTPATIENT
Start: 2019-05-14 | End: 2019-08-09

## 2019-05-21 NOTE — PROGRESS NOTES
SUBJECTIVE:   Paco Ponce is a 38 year old male who presents to clinic today for the following   health issues:        Acute Illness   Acute illness concerns: body aches, chills, sweats  Onset: 3-4 weeks - lasted about 12 hours    Fever: YES- hasn't checked temp but felt hot.    Chills/Sweats: YES    Headache (location?): YES    Sinus Pressure:no    Conjunctivitis:  no    Ear Pain: no    Rhinorrhea: no    Congestion: no    Sore Throat: no     Cough: no    Shortness of breath: no    Wheeze: no    Decreased Appetite: no    Nausea: no    Vomiting: no    Diarrhea:  no    Dysuria/Freq.: no    Fatigue/Achiness: YES, severe whole body aches    Sick/Strep Exposure: no     Therapies Tried and outcome: advil    He states that when these episodes have occurred, they may start when he is just sitting around. 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 When episodes occur, has not noticed heart racing or palpitations but also acknowledges that he hasn't really paid attention because the body aches are so bad. The headaches are different from his migraines and are less severe. No lower extremity edema or orthopnea.     Additional history: as documented    Reviewed  and updated as needed this visit by clinical staff  Tobacco  Allergies  Meds  Problems  Med Hx  Surg Hx  Fam Hx         Reviewed and updated as needed this visit by Provider  Tobacco  Allergies  Meds  Problems  Med Hx  Surg Hx  Fam Hx         Patient Active Problem List   Diagnosis     CARDIOVASCULAR SCREENING; LDL GOAL LESS THAN 160     Hypertension goal BP (blood pressure) < 140/90     Morbid obesity due to excess calories (H)     Tear of medial meniscus of left knee, current, unspecified tear type, subsequent encounter     Migraine with aura and without status migrainosus, not intractable     Kidney stone     Anxiety     Major depressive disorder     Past Surgical History:   Procedure Laterality Date     APPENDECTOMY        ARTHROSCOPY KNEE Left 02/01/2017    Procedure: Left knee arthroscopy and partial medial meniscectomy. Surgeon:  Yordan Main MD  Location: [facility]     ENT SURGERY       HERNIA REPAIR         Social History     Tobacco Use     Smoking status: Never Smoker     Smokeless tobacco: Never Used   Substance Use Topics     Alcohol use: Yes     Alcohol/week: 0.0 oz     Comment: per wk 1-2     History reviewed. No pertinent family history.        ROS:  CONSTITUTIONAL: NEGATIVE for fever, chills, change in weight  INTEGUMENTARY/SKIN: NEGATIVE for worrisome rashes, moles or lesions  EYES: NEGATIVE for vision changes or irritation  ENT/MOUTH: NEGATIVE for ear, mouth and throat problems  RESP: NEGATIVE for significant cough or SOB  BREAST: NEGATIVE for masses, tenderness or discharge  CV: NEGATIVE for chest pain, palpitations or peripheral edema  GI: NEGATIVE for nausea, abdominal pain, heartburn, or change in bowel habits  : NEGATIVE for frequency, dysuria, or hematuria  MUSCULOSKELETAL: NEGATIVE for significant arthralgias or myalgia  NEURO: NEGATIVE for weakness, dizziness or paresthesias  ENDOCRINE: NEGATIVE for temperature intolerance, skin/hair changes  HEME: NEGATIVE for bleeding problems  PSYCHIATRIC: NEGATIVE for changes in mood or affect    OBJECTIVE:                                                    /86   Pulse 99   Temp 98.5  F (36.9  C) (Oral)   Wt 119.3 kg (263 lb)   SpO2 100%   BMI 35.67 kg/m    Body mass index is 35.67 kg/m .  GENERAL: healthy, alert and no distress  EYES: Eyes grossly normal to inspection, extraocular movements - intact, and PERRL  HENT: ear canals- normal; TMs- normal; Nose- normal; Mouth- no ulcers, no lesions  NECK: no tenderness, no adenopathy, no asymmetry, no masses, no stiffness; thyroid- normal to palpation  RESP: lungs clear to auscultation - no rales, no rhonchi, no wheezes  CV: regular rates and rhythm, normal S1 S2, no S3 or S4 and no murmur, no click  or rub -  ABDOMEN: soft, no tenderness, no  hepatosplenomegaly, no masses, normal bowel sounds  MS: extremities- no gross deformities noted, no edema  SKIN: no suspicious lesions, no rashes  NEURO: strength and tone- normal, sensory exam- grossly normal, mentation- intact, speech- normal, reflexes- symmetric  PSYCH: Alert and oriented times 3; speech- coherent , normal rate and volume; able to articulate logical thoughts, able to abstract reason, no tangential thoughts, no hallucinations or delusions, affect- normal         ASSESSMENT/PLAN:                                                    Paco is a 38 year old male with past medical history significant for hypertension and migraines who presents with intermittent episodes of whole body aches, subjective fevers/chills, diaphoresis, jaw/left shoulder pain. He has had 3 of these episodes in the past 5 months and none necessarily associated with exertion. They resolve without any intervention after the day.Unclear etiology. It may simply represent a viral etiology. I also wonder if it could be due to blood pressure spikes. Recommend checking blood pressure and temperature if these happen again to see if it is elevated and if he has true fevers. With general unwell feeling along with sweating and jaw/left shoulder pain, I do wonder about possible cardiac etiology even though his symptoms are not exertional. He does have some family history of heart disease. He has HTN but no diabetes. Last lipid panel was a few years ago and had low HDL and elevated LDL. Will recheck lipids today and proceed with stress test for further evaluation. It is reasonable to also check other basic blood tests to reevaluate kidney function (creatine was mildly elevated a month ago), liver function tests, blood count, and thyroid. With possible fluctuation in blood pressure along with headaches, body aches, diaphoresis, I would also consider pheochromocytoma. If all work up is negative, could  collect 24 hour urine metanephrine for further evaluation.      1. Generalized body aches  - HIV Screening  - Lipid panel reflex to direct LDL Fasting  - TSH with free T4 reflex  - CBC with platelets and differential  - Echocardiogram Exercise Stress; Future  - Comprehensive metabolic panel (BMP + Alb, Alk Phos, ALT, AST, Total. Bili, TP)    2. Hypertension goal BP (blood pressure) < 140/90  - Echocardiogram Exercise Stress; Future    3. Nonintractable headache, unspecified chronicity pattern, unspecified headache type  - Echocardiogram Exercise Stress; Future    4. Jaw pain  - Echocardiogram Exercise Stress; Future        Douglas Longo,   [facility] ASIA

## 2019-05-22 ENCOUNTER — OFFICE VISIT (OUTPATIENT)
Dept: FAMILY MEDICINE | Facility: CLINIC | Age: 39
End: 2019-05-22
Payer: COMMERCIAL

## 2019-05-22 VITALS
BODY MASS INDEX: 35.67 KG/M2 | DIASTOLIC BLOOD PRESSURE: 86 MMHG | SYSTOLIC BLOOD PRESSURE: 134 MMHG | TEMPERATURE: 98.5 F | HEART RATE: 99 BPM | WEIGHT: 263 LBS | OXYGEN SATURATION: 100 %

## 2019-05-22 DIAGNOSIS — R68.84 JAW PAIN: ICD-10-CM

## 2019-05-22 DIAGNOSIS — R52 GENERALIZED BODY ACHES: Primary | ICD-10-CM

## 2019-05-22 DIAGNOSIS — R51.9 NONINTRACTABLE HEADACHE, UNSPECIFIED CHRONICITY PATTERN, UNSPECIFIED HEADACHE TYPE: ICD-10-CM

## 2019-05-22 DIAGNOSIS — I10 HYPERTENSION GOAL BP (BLOOD PRESSURE) < 140/90: ICD-10-CM

## 2019-05-22 LAB
BASOPHILS # BLD AUTO: 0 10E9/L (ref 0–0.2)
BASOPHILS NFR BLD AUTO: 0.3 %
DIFFERENTIAL METHOD BLD: NORMAL
EOSINOPHIL # BLD AUTO: 0.1 10E9/L (ref 0–0.7)
EOSINOPHIL NFR BLD AUTO: 1.8 %
ERYTHROCYTE [DISTWIDTH] IN BLOOD BY AUTOMATED COUNT: 12.9 % (ref 10–15)
HCT VFR BLD AUTO: 44.2 % (ref 40–53)
HGB BLD-MCNC: 15.1 G/DL (ref 13.3–17.7)
LYMPHOCYTES # BLD AUTO: 2 10E9/L (ref 0.8–5.3)
LYMPHOCYTES NFR BLD AUTO: 27.1 %
MCH RBC QN AUTO: 29.4 PG (ref 26.5–33)
MCHC RBC AUTO-ENTMCNC: 34.2 G/DL (ref 31.5–36.5)
MCV RBC AUTO: 86 FL (ref 78–100)
MONOCYTES # BLD AUTO: 0.6 10E9/L (ref 0–1.3)
MONOCYTES NFR BLD AUTO: 8 %
NEUTROPHILS # BLD AUTO: 4.7 10E9/L (ref 1.6–8.3)
NEUTROPHILS NFR BLD AUTO: 62.8 %
PLATELET # BLD AUTO: 297 10E9/L (ref 150–450)
RBC # BLD AUTO: 5.13 10E12/L (ref 4.4–5.9)
WBC # BLD AUTO: 7.4 10E9/L (ref 4–11)

## 2019-05-22 PROCEDURE — 87389 HIV-1 AG W/HIV-1&-2 AB AG IA: CPT | Performed by: FAMILY MEDICINE

## 2019-05-22 PROCEDURE — 85025 COMPLETE CBC W/AUTO DIFF WBC: CPT | Performed by: FAMILY MEDICINE

## 2019-05-22 PROCEDURE — 84443 ASSAY THYROID STIM HORMONE: CPT | Performed by: FAMILY MEDICINE

## 2019-05-22 PROCEDURE — 80061 LIPID PANEL: CPT | Performed by: FAMILY MEDICINE

## 2019-05-22 PROCEDURE — 80053 COMPREHEN METABOLIC PANEL: CPT | Performed by: FAMILY MEDICINE

## 2019-05-22 PROCEDURE — 99214 OFFICE O/P EST MOD 30 MIN: CPT | Performed by: FAMILY MEDICINE

## 2019-05-22 PROCEDURE — 84439 ASSAY OF FREE THYROXINE: CPT | Performed by: FAMILY MEDICINE

## 2019-05-22 PROCEDURE — 36415 COLL VENOUS BLD VENIPUNCTURE: CPT | Performed by: FAMILY MEDICINE

## 2019-05-22 NOTE — LETTER
[facility]                     ( [phone]   May 28, 2019    Paco Ponce  [address] [facility] [address]      Dear Paco,    Here is a summary of your recent test results:    Normal red blood cell (hgb) levels, normal white blood cell count and normal platelet levels.   -LDL(bad) cholesterol level is elevated, HDL(good) cholesterol level is low and your triglycerides are elevated which can increase your heart disease risk.  A diet high in fat and simple carbohydrates, genetics and being overweight can contribute to this.   WE ADVISE: exercising 150 minutes of aerobic exercise per week (30 minutes for 5 days per week or 50 minutes for 3 days per week are options), eating a low saturated fat/low carbohydrate diet, and omega-3 fatty acids (fish oil) 0227-3431 mg daily are helpful to improve this. In 12 months, you should recheck your fasting cholesterol panel by scheduling a lab-only appointment.   -Liver and gallbladder tests (ALT,AST, Alk phos,bilirubin) are normal.   -Kidney function (GFR) is normal.   -Sodium is normal.   -Potassium is normal.   -Calcium is normal.   -Glucose is slight elevated and may be a sign of early diabetes (prediabetes).  WE ADVISE:: eating a low carbohydrate diet, exercising, trying to lose weight (if necessary) and rechecking your glucose level in 12 months.   -TSH (thyroid stimulating hormone) level indicates is slightly elevated which can sometimes indicate an underactive thyroid (hypothyroidism).  This can cause a number of symptoms including weight gain, fatigue, high cholesterol, constipation, hair loss, cold intolerance. However, your active thyroid hormone (T4) level was normal. Sometimes we will treat people with a thyroid replacement medication if they are having some of the aforementioned symptoms, however, I would first recommend just rechecking your TSH in 1 month. If the results are similar and you feel like you are having  symptoms, we could start a medication.   -HIV test is normal.     Your test results are enclosed.      Please contact me if you have any questions.             Thank you very much for trusting [facility].     Healthy regards,  Douglas Longo,            Results for orders placed or performed in visit on 05/22/19   HIV Screening   Result Value Ref Range    HIV Antigen Antibody Combo Nonreactive NR^Nonreactive       Lipid panel reflex to direct LDL Fasting   Result Value Ref Range    Cholesterol 218 (H) <200 mg/dL    Triglycerides 314 (H) <150 mg/dL    HDL Cholesterol 36 (L) >39 mg/dL    LDL Cholesterol Calculated 119 (H) <100 mg/dL    Non HDL Cholesterol 182 (H) <130 mg/dL   TSH with free T4 reflex   Result Value Ref Range    TSH 4.92 (H) 0.40 - 4.00 mU/L   CBC with platelets and differential   Result Value Ref Range    WBC 7.4 4.0 - 11.0 10e9/L    RBC Count 5.13 4.4 - 5.9 10e12/L    Hemoglobin 15.1 13.3 - 17.7 g/dL    Hematocrit 44.2 40.0 - 53.0 %    MCV 86 78 - 100 fl    MCH 29.4 26.5 - 33.0 pg    MCHC 34.2 31.5 - 36.5 g/dL    RDW 12.9 10.0 - 15.0 %    Platelet Count 297 150 - 450 10e9/L    % Neutrophils 62.8 %    % Lymphocytes 27.1 %    % Monocytes 8.0 %    % Eosinophils 1.8 %    % Basophils 0.3 %    Absolute Neutrophil 4.7 1.6 - 8.3 10e9/L    Absolute Lymphocytes 2.0 0.8 - 5.3 10e9/L    Absolute Monocytes 0.6 0.0 - 1.3 10e9/L    Absolute Eosinophils 0.1 0.0 - 0.7 10e9/L    Absolute Basophils 0.0 0.0 - 0.2 10e9/L    Diff Method Automated Method    Comprehensive metabolic panel (BMP + Alb, Alk Phos, ALT, AST, Total. Bili, TP)   Result Value Ref Range    Sodium 137 133 - 144 mmol/L    Potassium 4.4 3.4 - 5.3 mmol/L    Chloride 102 94 - 109 mmol/L    Carbon Dioxide 26 20 - 32 mmol/L    Anion Gap 9 3 - 14 mmol/L    Glucose 107 (H) 70 - 99 mg/dL    Urea Nitrogen 16 7 - 30 mg/dL    Creatinine 1.06 0.66 - 1.25 mg/dL    GFR Estimate 87 >60 mL/min/[1.73_m2]    GFR Estimate If Black >90 >60 mL/min/[1.73_m2]     Calcium 9.9 8.5 - 10.1 mg/dL    Bilirubin Total 0.4 0.2 - 1.3 mg/dL    Albumin 4.4 3.4 - 5.0 g/dL    Protein Total 8.2 6.8 - 8.8 g/dL    Alkaline Phosphatase 94 40 - 150 U/L    ALT 41 0 - 70 U/L    AST 28 0 - 45 U/L   T4 free   Result Value Ref Range    T4 Free 0.82 0.76 - 1.46 ng/dL

## 2019-05-23 LAB
ALBUMIN SERPL-MCNC: 4.4 G/DL (ref 3.4–5)
ALP SERPL-CCNC: 94 U/L (ref 40–150)
ALT SERPL W P-5'-P-CCNC: 41 U/L (ref 0–70)
ANION GAP SERPL CALCULATED.3IONS-SCNC: 9 MMOL/L (ref 3–14)
AST SERPL W P-5'-P-CCNC: 28 U/L (ref 0–45)
BILIRUB SERPL-MCNC: 0.4 MG/DL (ref 0.2–1.3)
BUN SERPL-MCNC: 16 MG/DL (ref 7–30)
CALCIUM SERPL-MCNC: 9.9 MG/DL (ref 8.5–10.1)
CHLORIDE SERPL-SCNC: 102 MMOL/L (ref 94–109)
CHOLEST SERPL-MCNC: 218 MG/DL
CO2 SERPL-SCNC: 26 MMOL/L (ref 20–32)
CREAT SERPL-MCNC: 1.06 MG/DL (ref 0.66–1.25)
GFR SERPL CREATININE-BSD FRML MDRD: 87 ML/MIN/{1.73_M2}
GLUCOSE SERPL-MCNC: 107 MG/DL (ref 70–99)
HDLC SERPL-MCNC: 36 MG/DL
HIV 1+2 AB+HIV1 P24 AG SERPL QL IA: NONREACTIVE
LDLC SERPL CALC-MCNC: 119 MG/DL
NONHDLC SERPL-MCNC: 182 MG/DL
POTASSIUM SERPL-SCNC: 4.4 MMOL/L (ref 3.4–5.3)
PROT SERPL-MCNC: 8.2 G/DL (ref 6.8–8.8)
SODIUM SERPL-SCNC: 137 MMOL/L (ref 133–144)
T4 FREE SERPL-MCNC: 0.82 NG/DL (ref 0.76–1.46)
TRIGL SERPL-MCNC: 314 MG/DL
TSH SERPL DL<=0.005 MIU/L-ACNC: 4.92 MU/L (ref 0.4–4)

## 2019-05-27 DIAGNOSIS — R79.89 ELEVATED TSH: Primary | ICD-10-CM

## 2019-06-10 ENCOUNTER — HOSPITAL ENCOUNTER (OUTPATIENT)
Dept: CARDIOLOGY | Facility: CLINIC | Age: 39
Discharge: HOME | End: 2019-06-10
Attending: FAMILY MEDICINE | Admitting: FAMILY MEDICINE
Payer: COMMERCIAL

## 2019-06-10 DIAGNOSIS — R51.9 NONINTRACTABLE HEADACHE, UNSPECIFIED CHRONICITY PATTERN, UNSPECIFIED HEADACHE TYPE: ICD-10-CM

## 2019-06-10 DIAGNOSIS — I10 HYPERTENSION GOAL BP (BLOOD PRESSURE) < 140/90: ICD-10-CM

## 2019-06-10 DIAGNOSIS — R68.84 JAW PAIN: ICD-10-CM

## 2019-06-10 DIAGNOSIS — R52 GENERALIZED BODY ACHES: ICD-10-CM

## 2019-06-10 PROCEDURE — 93018 CV STRESS TEST I&R ONLY: CPT | Performed by: INTERNAL MEDICINE

## 2019-06-10 PROCEDURE — 93321 DOPPLER ECHO F-UP/LMTD STD: CPT | Mod: 26 | Performed by: INTERNAL MEDICINE

## 2019-06-10 PROCEDURE — 40000264 ECHO STRESS ECHOCARDIOGRAM

## 2019-06-10 PROCEDURE — 93016 CV STRESS TEST SUPVJ ONLY: CPT | Performed by: INTERNAL MEDICINE

## 2019-06-10 PROCEDURE — 93325 DOPPLER ECHO COLOR FLOW MAPG: CPT | Mod: 26 | Performed by: INTERNAL MEDICINE

## 2019-06-10 PROCEDURE — 25500064 ZZH RX 255 OP 636: Performed by: FAMILY MEDICINE

## 2019-06-10 PROCEDURE — 93350 STRESS TTE ONLY: CPT | Mod: 26 | Performed by: INTERNAL MEDICINE

## 2019-06-10 RX ADMIN — HUMAN ALBUMIN MICROSPHERES AND PERFLUTREN 7 ML: 10; .22 INJECTION, SOLUTION INTRAVENOUS at 10:45

## 2019-06-11 ENCOUNTER — TELEPHONE (OUTPATIENT)
Dept: FAMILY MEDICINE | Facility: CLINIC | Age: 39
End: 2019-06-11

## 2019-06-11 NOTE — TELEPHONE ENCOUNTER
Patient calling for Echo results.  Results given per providers note.    IVAN RegaladoN, RN  [facility] [facility]

## 2019-08-09 DIAGNOSIS — I10 HYPERTENSION GOAL BP (BLOOD PRESSURE) < 140/90: ICD-10-CM

## 2019-08-09 RX ORDER — LISINOPRIL 10 MG/1
TABLET ORAL
Qty: 90 TABLET | Refills: 2 | Status: SHIPPED | OUTPATIENT
Start: 2019-08-09 | End: 2020-05-05

## 2019-08-09 RX ORDER — HYDROCHLOROTHIAZIDE 25 MG/1
TABLET ORAL
Qty: 90 TABLET | Refills: 2 | Status: SHIPPED | OUTPATIENT
Start: 2019-08-09 | End: 2020-05-05

## 2019-08-09 NOTE — TELEPHONE ENCOUNTER
"Requested Prescriptions   Pending Prescriptions Disp Refills     hydrochlorothiazide (HYDRODIURIL) 25 MG tablet [Pharmacy Med Name: HYDROCHLOROTHIAZIDE 25 MG TAB]  Last Written Prescription Date:  5/14/2019  Last Fill Quantity: 90 tablet,  # refills: 0   Last office visit: 5/22/2019 with prescribing provider:  Qing     Future Office Visit:       90 tablet 0     Sig: TAKE 1 TABLET BY MOUTH EVERY DAY       Diuretics (Including Combos) Protocol Passed - 8/9/2019  1:22 AM        Passed - Blood pressure under 140/90 in past 12 months     BP Readings from Last 3 Encounters:   05/22/19 134/86   05/14/19 (!) 148/100   04/30/19 (!) 150/94             Passed - Recent (12 mo) or future (30 days) visit within the authorizing provider's specialty     Patient had office visit in the last 12 months or has a visit in the next 30 days with authorizing provider or within the authorizing provider's specialty.  See \"Patient Info\" tab in inbasket, or \"Choose Columns\" in Meds & Orders section of the refill encounter.              Passed - Medication is active on med list        Passed - Patient is age 18 or older        Passed - Normal serum creatinine on file in past 12 months     Recent Labs   Lab Test 05/22/19  0916   CR 1.06              Passed - Normal serum potassium on file in past 12 months     Recent Labs   Lab Test 05/22/19  0916   POTASSIUM 4.4              Passed - Normal serum sodium on file in past 12 months     Recent Labs   Lab Test 05/22/19  0916                           lisinopril (PRINIVIL/ZESTRIL) 10 MG tablet [Pharmacy Med Name: LISINOPRIL 10 MG TABLET]  Last Written Prescription Date:  5/14/2019  Last Fill Quantity: 90 tablet,  # refills: 0   Last office visit: 5/22/2019 with prescribing provider:  Qing     Future Office Visit:       90 tablet 0     Sig: TAKE 1 TABLET BY MOUTH EVERY DAY       ACE Inhibitors (Including Combos) Protocol Passed - 8/9/2019  1:22 AM        Passed - Blood pressure under " "140/90 in past 12 months     BP Readings from Last 3 Encounters:   05/22/19 134/86   05/14/19 (!) 148/100   04/30/19 (!) 150/94             Passed - Recent (12 mo) or future (30 days) visit within the authorizing provider's specialty     Patient had office visit in the last 12 months or has a visit in the next 30 days with authorizing provider or within the authorizing provider's specialty.  See \"Patient Info\" tab in inbasket, or \"Choose Columns\" in Meds & Orders section of the refill encounter.              Passed - Medication is active on med list        Passed - Patient is age 18 or older        Passed - Normal serum creatinine on file in past 12 months     Recent Labs   Lab Test 05/22/19  0916   CR 1.06             Passed - Normal serum potassium on file in past 12 months     Recent Labs   Lab Test 05/22/19  0916   POTASSIUM 4.4             "

## 2019-10-22 ENCOUNTER — OFFICE VISIT (OUTPATIENT)
Dept: FAMILY MEDICINE | Facility: CLINIC | Age: 39
End: 2019-10-22
Payer: COMMERCIAL

## 2019-10-22 VITALS
HEART RATE: 92 BPM | TEMPERATURE: 98.3 F | BODY MASS INDEX: 35.4 KG/M2 | SYSTOLIC BLOOD PRESSURE: 124 MMHG | WEIGHT: 261 LBS | OXYGEN SATURATION: 98 % | DIASTOLIC BLOOD PRESSURE: 82 MMHG

## 2019-10-22 DIAGNOSIS — J20.9 ACUTE BRONCHITIS WITH SYMPTOMS > 10 DAYS: Primary | ICD-10-CM

## 2019-10-22 PROCEDURE — 99213 OFFICE O/P EST LOW 20 MIN: CPT | Performed by: FAMILY MEDICINE

## 2019-10-22 RX ORDER — ALBUTEROL SULFATE 90 UG/1
2 AEROSOL, METERED RESPIRATORY (INHALATION) EVERY 4 HOURS PRN
Qty: 1 INHALER | Refills: 1 | Status: SHIPPED | OUTPATIENT
Start: 2019-10-22 | End: 2020-03-05

## 2019-10-22 RX ORDER — AZITHROMYCIN 250 MG/1
TABLET, FILM COATED ORAL
Qty: 6 TABLET | Refills: 0 | Status: SHIPPED | OUTPATIENT
Start: 2019-10-22 | End: 2020-02-17

## 2019-10-22 RX ORDER — CODEINE PHOSPHATE AND GUAIFENESIN 10; 100 MG/5ML; MG/5ML
1-2 SOLUTION ORAL
Qty: 80 ML | Refills: 0 | Status: SHIPPED | OUTPATIENT
Start: 2019-10-22 | End: 2020-02-17

## 2019-10-22 NOTE — PROGRESS NOTES
Subjective     Paco Ponce is a 39 year old male who presents to clinic today for the following health issues:    HPI   Acute Illness   Acute illness concerns: sore throat, cough  Onset: 2 weeks    Fever: no    Chills/Sweats: YES    Headache (location?): no    Sinus Pressure:YES    Conjunctivitis:  YES - both    Ear Pain: no    Rhinorrhea: no    Congestion: YES    Sore Throat: YES     Cough: YES-productive of green sputum    Wheeze: YES    Decreased Appetite: no    Nausea: no    Vomiting: no    Diarrhea:  no    Dysuria/Freq.: no    Fatigue/Achiness: YES- fatigue    Sick/Strep Exposure: no     Therapies Tried and outcome: nyquil, dayquil, advil, throat sprays      Patient Active Problem List   Diagnosis     CARDIOVASCULAR SCREENING; LDL GOAL LESS THAN 160     Hypertension goal BP (blood pressure) < 140/90     Morbid obesity due to excess calories (H)     Tear of medial meniscus of left knee, current, unspecified tear type, subsequent encounter     Migraine with aura and without status migrainosus, not intractable     Kidney stone     Anxiety     Major depressive disorder     Past Surgical History:   Procedure Laterality Date     APPENDECTOMY       ARTHROSCOPY KNEE Left 02/01/2017    Procedure: Left knee arthroscopy and partial medial meniscectomy. Surgeon:  Yordan Main MD  Location: [facility]     ENT SURGERY       HERNIA REPAIR         Social History     Tobacco Use     Smoking status: Never Smoker     Smokeless tobacco: Never Used   Substance Use Topics     Alcohol use: Yes     Alcohol/week: 0.0 standard drinks     Comment: per wk 1-2     History reviewed. No pertinent family history.      Reviewed and updated as needed this visit by Provider  Tobacco  Allergies  Meds  Problems  Med Hx  Surg Hx  Fam Hx         Review of Systems   ROS COMP: Constitutional, HEENT, cardiovascular, pulmonary, gi and gu systems are negative, except as otherwise noted.      Objective    /82   Pulse  92   Temp 98.3  F (36.8  C) (Oral)   Wt 118.4 kg (261 lb)   SpO2 98%   BMI 35.40 kg/m    Body mass index is 35.4 kg/m .  Physical Exam   GENERAL: healthy, alert and no distress  EYES: Eyes grossly normal to inspection, PERRL and conjunctivae and sclerae normal  HENT: ear canals and TM's normal, nose and mouth without ulcers or lesions  NECK: no adenopathy and no asymmetry, masses, or scars  RESP: lungs clear to auscultation - no rales, rhonchi or wheezes  CV: regular rate and rhythm, normal S1 S2, no S3 or S4, no murmur, click or rub, no peripheral edema and peripheral pulses strong  MS: no gross musculoskeletal defects noted, no edema  SKIN: no suspicious lesions or rashes  PSYCH: mentation appears normal, affect normal/bright    Diagnostic Test Results:  none         Assessment & Plan     1. Acute bronchitis with symptoms > 10 days: start azithromycin, and use albuterol as needed. Okay to use cough syrup at night for cough relief/sleep. Continue other supportive cares as helpful - push fluids, and rest. Follow up in 1-2 weeks if still not improving.  - azithromycin (ZITHROMAX) 250 MG tablet; Two tablets first day, then one tablet daily for four days.  Dispense: 6 tablet; Refill: 0  - albuterol (PROAIR HFA/PROVENTIL HFA/VENTOLIN HFA) 108 (90 Base) MCG/ACT inhaler; Inhale 2 puffs into the lungs every 4 hours as needed for shortness of breath / dyspnea or wheezing  Dispense: 1 Inhaler; Refill: 1  - guaiFENesin-codeine (ROBITUSSIN AC) 100-10 MG/5ML solution; Take 5-10 mLs by mouth nightly as needed for cough  Dispense: 80 mL; Refill: 0       Return in about 2 weeks (around 11/5/2019) for follow up if symptoms not improving.    Douglas Longo,   [facility]

## 2019-11-04 DIAGNOSIS — F41.9 ANXIETY: ICD-10-CM

## 2019-11-04 DIAGNOSIS — F33.1 MAJOR DEPRESSIVE DISORDER, RECURRENT EPISODE, MODERATE (H): ICD-10-CM

## 2019-11-04 NOTE — TELEPHONE ENCOUNTER
Routing refill request to provider for review/approval because:  Provider review needed because:  Does not appear this issue has been addressed in over one year. Last PHQ 4/19. Most OV's have been acute.     Lizzy Orta R.N.

## 2019-11-04 NOTE — TELEPHONE ENCOUNTER
"Requested Prescriptions   Pending Prescriptions Disp Refills     venlafaxine (EFFEXOR-XR) 150 MG 24 hr capsule [Pharmacy Med Name: VENLAFAXINE HCL  MG CAP]  Last Written Prescription Date:  5/13/2019  Last Fill Quantity: 90 capsule,  # refills: 1   Last office visit: 10/22/2019 with prescribing provider:  Qing     Future Office Visit:       90 capsule 1     Sig: TAKE 1 CAPSULE (150 MG) BY MOUTH DAILY       Serotonin-Norepinephrine Reuptake Inhibitors  Failed - 11/4/2019  2:08 AM        Failed - PHQ-9 score of less than 5 in past 6 months     Please review last PHQ-9 score.     PHQ-9 SCORE 3/26/2018 7/10/2018 4/19/2019   PHQ-9 Total Score 7 2 2     MAAME-7 SCORE 3/26/2018 7/10/2018 4/19/2019   Total Score 7 2 3             Passed - Blood pressure under 140/90 in past 12 months     BP Readings from Last 3 Encounters:   10/22/19 124/82   05/22/19 134/86   05/14/19 (!) 148/100             Passed - Medication is active on med list        Passed - Patient is age 18 or older        Passed - Normal serum creatinine on file in past 12 months     Recent Labs   Lab Test 05/22/19  0916   CR 1.06             Passed - Recent (6 mo) or future (30 days) visit within the authorizing provider's specialty     Patient had office visit in the last 6 months or has a visit in the next 30 days with authorizing provider or within the authorizing provider's specialty.  See \"Patient Info\" tab in inbasket, or \"Choose Columns\" in Meds & Orders section of the refill encounter.            "

## 2019-11-06 RX ORDER — VENLAFAXINE HYDROCHLORIDE 150 MG/1
150 CAPSULE, EXTENDED RELEASE ORAL DAILY
Qty: 90 CAPSULE | Refills: 1 | Status: SHIPPED | OUTPATIENT
Start: 2019-11-06 | End: 2020-03-05

## 2019-11-07 NOTE — TELEPHONE ENCOUNTER
Refill signed.He is due for follow up on mood/PHQ9/MAAME-7. This could be done virtually or via telephone.    Douglas Longo DO  11/6/2019 10:22 PM

## 2019-11-15 NOTE — TELEPHONE ENCOUNTER
Called pt at [phone] and left non detailed message for pt to call back.  See below.  Needs medication follow up.  Can be done as evisit if he signs up for MyChart or as phone visit.  Kendra Pineda

## 2019-11-15 NOTE — TELEPHONE ENCOUNTER
Patient returned phone call, advised of message but patient states he will have to call back to schedule an appointment.    Yasmeen PAYNE  Patient Representative - [facility]

## 2020-01-07 ENCOUNTER — TELEPHONE (OUTPATIENT)
Dept: FAMILY MEDICINE | Facility: CLINIC | Age: 40
End: 2020-01-07

## 2020-01-07 NOTE — TELEPHONE ENCOUNTER
Reason for Call:  Other prescription    Detailed comments: Paco's son recently tested positive for strep and flu a. Would like to know if there is anything he can take as a prophylactic     Phone Number Patient can be reached at: Home number on file [phone] (home)    Best Time: anytime     Can we leave a detailed message on this number? YES    Call taken on 1/7/2020 at 10:01 AM by Nasra Samaniego

## 2020-01-07 NOTE — TELEPHONE ENCOUNTER
No recommended prophylaxis for strep. Avoid close contact, sharing utensils, cups, or other items.    Douglas Longo,   1/7/2020 10:45 AM

## 2020-01-07 NOTE — TELEPHONE ENCOUNTER
Please see message from patient below. Please advise. Thank you.  IVAN HudsonN, RN  [facility]

## 2020-02-17 ENCOUNTER — OFFICE VISIT (OUTPATIENT)
Dept: FAMILY MEDICINE | Facility: CLINIC | Age: 40
End: 2020-02-17
Payer: COMMERCIAL

## 2020-02-17 VITALS
SYSTOLIC BLOOD PRESSURE: 136 MMHG | BODY MASS INDEX: 36.03 KG/M2 | HEIGHT: 72 IN | HEART RATE: 106 BPM | OXYGEN SATURATION: 98 % | DIASTOLIC BLOOD PRESSURE: 80 MMHG | TEMPERATURE: 98 F | WEIGHT: 266 LBS

## 2020-02-17 DIAGNOSIS — F33.9 RECURRENT MAJOR DEPRESSIVE DISORDER, REMISSION STATUS UNSPECIFIED (H): ICD-10-CM

## 2020-02-17 DIAGNOSIS — M25.562 LEFT KNEE PAIN, UNSPECIFIED CHRONICITY: Primary | ICD-10-CM

## 2020-02-17 DIAGNOSIS — E66.01 MORBID OBESITY DUE TO EXCESS CALORIES (H): ICD-10-CM

## 2020-02-17 PROCEDURE — 99214 OFFICE O/P EST MOD 30 MIN: CPT | Performed by: FAMILY MEDICINE

## 2020-02-17 RX ORDER — VENLAFAXINE HYDROCHLORIDE 225 MG/1
225 TABLET, EXTENDED RELEASE ORAL DAILY
Qty: 30 TABLET | Refills: 0 | Status: SHIPPED | OUTPATIENT
Start: 2020-02-17 | End: 2020-03-13

## 2020-02-17 RX ORDER — SILDENAFIL CITRATE 20 MG/1
TABLET ORAL
Qty: 30 TABLET | Refills: 0 | Status: SHIPPED | OUTPATIENT
Start: 2020-02-17 | End: 2020-03-05

## 2020-02-17 ASSESSMENT — MIFFLIN-ST. JEOR: SCORE: 2149.57

## 2020-02-17 ASSESSMENT — PATIENT HEALTH QUESTIONNAIRE - PHQ9: SUM OF ALL RESPONSES TO PHQ QUESTIONS 1-9: 6

## 2020-02-17 NOTE — PROGRESS NOTES
Subjective     Paco Ponce is a 39 year old male who presents to clinic today for the following health issues:    HPI   Depression and Anxiety Follow-Up    How are you doing with your depression since your last visit? No change - stable but wife says he is matthews and tired all the time    How are you doing with your anxiety since your last visit?  No change    Are you having other symptoms that might be associated with depression or anxiety? No    Have you had a significant life event? No     Do you have any concerns with your use of alcohol or other drugs? No      He was initially started on Effexor to help with mood and also body pain. Pain has improved while on medication as well as mood. States he does have loss of libido and difficulty maintaining erections while taking Effexor.    Social History     Tobacco Use     Smoking status: Never Smoker     Smokeless tobacco: Never Used   Substance Use Topics     Alcohol use: Yes     Alcohol/week: 0.0 standard drinks     Comment: per wk 1-2     Drug use: No     PHQ 7/10/2018 4/19/2019 2/17/2020   PHQ-9 Total Score 2 2 6   Q9: Thoughts of better off dead/self-harm past 2 weeks Not at all Not at all Not at all   F/U: Thoughts of suicide or self-harm - - -   F/U: Safety concerns - - -     MAAME-7 SCORE 3/26/2018 7/10/2018 4/19/2019   Total Score 7 2 3     Suicide Assessment Five-step Evaluation and Treatment (SAFE-T)      How many servings of fruits and vegetables do you eat daily?  2-3    On average, how many sweetened beverages do you drink each day (Examples: soda, juice, sweet tea, etc.  Do NOT count diet or artificially sweetened beverages)?   2    How many days per week do you exercise enough to make your heart beat faster? 3 or less - has physical job    How many minutes a day do you exercise enough to make your heart beat faster? 9 or less    How many days per week do you miss taking your medication? 0        Joint Pain    Onset: several weeks    Description:    Location: left knee, behind knee and the front. Pain seems to radiate up the side of his leg to the hip.  Character: Stabbing    Intensity: moderate    Progression of Symptoms: same    Accompanying Signs & Symptoms:  Other symptoms: radiation of pain to right hip and feeling of catching in knee while on stairs    History:   Previous similar pain: YES- back in 2017, tore medial meniscus      Precipitating factors:   Trauma or overuse: no     Alleviating factors:  Improved by: none    Therapies Tried and outcome: heat, ice, acetaminophen, NSAID - Aleve and rest and elevation            Patient Active Problem List   Diagnosis     CARDIOVASCULAR SCREENING; LDL GOAL LESS THAN 160     Hypertension goal BP (blood pressure) < 140/90     Morbid obesity due to excess calories (H)     Tear of medial meniscus of left knee, current, unspecified tear type, subsequent encounter     Migraine with aura and without status migrainosus, not intractable     Kidney stone     Anxiety     Major depressive disorder     Past Surgical History:   Procedure Laterality Date     APPENDECTOMY       ARTHROSCOPY KNEE Left 02/01/2017    Procedure: Left knee arthroscopy and partial medial meniscectomy. Surgeon:  Yordan Main MD  Location: [facility]     ENT SURGERY       HERNIA REPAIR         Social History     Tobacco Use     Smoking status: Never Smoker     Smokeless tobacco: Never Used   Substance Use Topics     Alcohol use: Yes     Alcohol/week: 0.0 standard drinks     Comment: per wk 1-2     No family history on file.        Reviewed and updated as needed this visit by Provider         Review of Systems   ROS COMP: Constitutional, HEENT, cardiovascular, pulmonary, gi and gu systems are negative, except as otherwise noted.      Objective    /80   Pulse 106   Temp 98  F (36.7  C) (Oral)   Ht 1.829 m (6')   Wt 120.7 kg (266 lb)   SpO2 98%   BMI 36.08 kg/m    Body mass index is 36.08 kg/m .  Physical Exam   GENERAL:  healthy, alert and no distress  EYES: Eyes grossly normal to inspection, PERRL and conjunctivae and sclerae normal  RESP: lungs clear to auscultation - no rales, rhonchi or wheezes  CV: regular rate and rhythm, normal S1 S2, no S3 or S4, no murmur, click or rub, no peripheral edema and peripheral pulses strong  MS: no gross musculoskeletal defects noted, no edema; negative anterior/posterior drawer test. No joint isntability. Does have pain with [place] and [facility] on the medial aspect of his left knee.  PSYCH: mentation appears normal, affect normal/bright    Diagnostic Test Results:  Labs reviewed in Epic        Assessment & Plan     1. Recurrent major depressive disorder, remission status unspecified (H): discussed that loss of libido/ED is common with anti-depressant medications. As his pain did improve quite a bit, would like to stick with Effexor. Will try increasing dose to 225 mg daily. Try sildenafil as well to help with erections. Could consider adding Wellbutrin in the future to help with mood if still not improved. Follow up in 1 month.  - venlafaxine (EFFEXOR-ER) 225 MG 24 hr tablet; Take 1 tablet (225 mg) by mouth daily  Dispense: 30 tablet; Refill: 0  - sildenafil (REVATIO) 20 MG tablet; Take 1-5 tablets ( mg) by mouth daily 30-60 minutes before anticipated need  Dispense: 30 tablet; Refill: 0    2. Morbid obesity due to excess calories (H): encouraged regular exercise, monitoring diet    3. Left knee pain, unspecified chronicity: pain location/history and exam concerning for possible meniscal etiology. Will refer to orthopedics for additional evaluation and recommendations.  - Orthopedic & Spine  Referral; Future       Return in about 1 month (around 3/17/2020) for telephone follow up on medication change..    Douglas Longo DO  [facility]

## 2020-02-27 ENCOUNTER — OFFICE VISIT (OUTPATIENT)
Dept: ORTHOPEDICS | Facility: CLINIC | Age: 40
End: 2020-02-27
Payer: COMMERCIAL

## 2020-02-27 VITALS
HEIGHT: 72 IN | DIASTOLIC BLOOD PRESSURE: 88 MMHG | BODY MASS INDEX: 36.03 KG/M2 | WEIGHT: 266 LBS | SYSTOLIC BLOOD PRESSURE: 132 MMHG

## 2020-02-27 DIAGNOSIS — S83.242A TEAR OF MEDIAL MENISCUS OF LEFT KNEE, CURRENT, UNSPECIFIED TEAR TYPE, INITIAL ENCOUNTER: Primary | ICD-10-CM

## 2020-02-27 PROCEDURE — 99204 OFFICE O/P NEW MOD 45 MIN: CPT | Performed by: ORTHOPAEDIC SURGERY

## 2020-02-27 ASSESSMENT — MIFFLIN-ST. JEOR: SCORE: 2149.57

## 2020-02-27 NOTE — LETTER
2/27/2020         RE: Paco Ponce  [address]  [place] [address]        Dear Colleague,    Thank you for referring your patient, Paco Ponce, to the [facility] ORTHOPEDIC SURGERY. Please see a copy of my visit note below.    HISTORY OF PRESENT ILLNESS:    Paco Ponce is a 39 year old male who is seen in follow up for left knee pain. Patient had a L knee arthroscopy, partial medial meniscetomy, DOS 2/01/2017.   Present symptoms: patient noticed his knee pain return insidiously a couple of weeks ago, denies a specific mechanism of injury. Pain is located anterior, medial and posterior. Pain worsens with walking, standing and stair climbing. Patient denies any other associated symptoms. He rates pain 7/10 at worst and 3/10 currently.   When he had the medial meniscus tear issue in 2017, there was no specific injury.  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  He denies any loose body sensation.  Treatments tried to this point: ice and ibuprofen   Past Medical History: Unchanged from the visit of 2/21/2017. Please refer to that note.  History of migraine, kidney stone and anxiety/depression  Prior operation: Left knee arthroscopy    Medications: Reviewed on epic    REVIEW OF SYSTEMS:  CONSTITUTIONAL:  NEGATIVE for fever, chills, change in weight  INTEGUMENTARY/SKIN:  NEGATIVE for worrisome rashes, moles or lesions  EYES:  NEGATIVE for vision changes or irritation  ENT/MOUTH:  NEGATIVE for ear, mouth and throat problems  RESP:  NEGATIVE for significant cough or SOB  BREAST:  NEGATIVE for masses, tenderness or discharge  CV: hypertension  NEGATIVE for chest pain, palpitations or peripheral edema  GI:  NEGATIVE for nausea, abdominal pain, heartburn, or change in bowel habits  :  Negative   MUSCULOSKELETAL:  See HPI above  NEURO:  NEGATIVE for weakness, dizziness or paresthesias  ENDOCRINE:  NEGATIVE for temperature intolerance, skin/hair changes  HEME/ALLERGY/IMMUNE:   NEGATIVE for bleeding problems  PSYCHIATRIC: depression NEGATIVE for changes in mood or affect    PHYSICAL EXAM:  /88   Ht 1.829 m (6')   Wt 120.7 kg (266 lb)   BMI 36.08 kg/m     Body mass index is 36.08 kg/m .   GENERAL APPEARANCE: healthy and no distress   SKIN: no suspicious lesions or rashes  NEURO: Normal strength and tone, mentation intact and speech normal  VASCULAR:  good pulses, and cappillary refill   LYMPH: no lymphadenopathy   PSYCH:  mentation appears normal and affect normal/bright    MSK:  Not in acute distress  He is able to get up from sitting without any difficulty   no specific effusion in both knees  Symmetrical full range of motion  No patellofemoral crepitus or tenderness with a palpation  Well-defined medial joint line pain, left, negative on the right  Intact ligaments throughout  Deep flexion causes of pain in the medial joint space  No significant popliteal mass, left knee  Circulation is intact  Sensation is intact  Skin is intact      IMAGING INTERPRETATION: None taken today     ASSESSMENT:  Recurrent medial joint line pain most likely due to recurrent medial meniscus tear, left knee    PLAN:  We looked at the intraoperative photographs from the time of previous surgery, February 1, 2017.  He really did not have any significant articular cartilage issue.  Based on his symptoms which are very similar to what he experienced and based on physical examination findings noted today, we anticipate recurrent tear of the medial meniscus in the remaining meniscus from the previous surgery.  To confirm the diagnosis further, MRI scan is recommended.  He understands our discussion.  We will call him with the result when it becomes available.         Yordan Main MD  Dept. Orthopedic Surgery  [facility]       Disclaimer: This note consists of symbols derived from keyboarding, dictation and/or voice recognition software. As a result, there may be errors in the script that have gone  undetected. Please consider this when interpreting information found in this chart.      Again, thank you for allowing me to participate in the care of your patient.        Sincerely,        Yordan Main MD

## 2020-02-27 NOTE — PROGRESS NOTES
HISTORY OF PRESENT ILLNESS:    Paco Ponce is a 39 year old male who is seen in follow up for left knee pain. Patient had a L knee arthroscopy, partial medial meniscetomy, DOS 2/01/2017.   Present symptoms: patient noticed his knee pain return insidiously a couple of weeks ago, denies a specific mechanism of injury. Pain is located anterior, medial and posterior. Pain worsens with walking, standing and stair climbing. Patient denies any other associated symptoms. He rates pain 7/10 at worst and 3/10 currently.   When he had the medial meniscus tear issue in 2017, there was no specific injury.  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  He denies any loose body sensation.  Treatments tried to this point: ice and ibuprofen   Past Medical History: Unchanged from the visit of 2/21/2017. Please refer to that note.  History of migraine, kidney stone and anxiety/depression  Prior operation: Left knee arthroscopy    Medications: Reviewed on epic    REVIEW OF SYSTEMS:  CONSTITUTIONAL:  NEGATIVE for fever, chills, change in weight  INTEGUMENTARY/SKIN:  NEGATIVE for worrisome rashes, moles or lesions  EYES:  NEGATIVE for vision changes or irritation  ENT/MOUTH:  NEGATIVE for ear, mouth and throat problems  RESP:  NEGATIVE for significant cough or SOB  BREAST:  NEGATIVE for masses, tenderness or discharge  CV: hypertension  NEGATIVE for chest pain, palpitations or peripheral edema  GI:  NEGATIVE for nausea, abdominal pain, heartburn, or change in bowel habits  :  Negative   MUSCULOSKELETAL:  See HPI above  NEURO:  NEGATIVE for weakness, dizziness or paresthesias  ENDOCRINE:  NEGATIVE for temperature intolerance, skin/hair changes  HEME/ALLERGY/IMMUNE:  NEGATIVE for bleeding problems  PSYCHIATRIC: depression NEGATIVE for changes in mood or affect    PHYSICAL EXAM:  /88   Ht 1.829 m (6')   Wt 120.7 kg (266 lb)   BMI 36.08 kg/m    Body mass index is 36.08 kg/m .   GENERAL APPEARANCE: healthy and no distress    SKIN: no suspicious lesions or rashes  NEURO: Normal strength and tone, mentation intact and speech normal  VASCULAR:  good pulses, and cappillary refill   LYMPH: no lymphadenopathy   PSYCH:  mentation appears normal and affect normal/bright    MSK:  Not in acute distress  He is able to get up from sitting without any difficulty   no specific effusion in both knees  Symmetrical full range of motion  No patellofemoral crepitus or tenderness with a palpation  Well-defined medial joint line pain, left, negative on the right  Intact ligaments throughout  Deep flexion causes of pain in the medial joint space  No significant popliteal mass, left knee  Circulation is intact  Sensation is intact  Skin is intact      IMAGING INTERPRETATION: None taken today     ASSESSMENT:  Recurrent medial joint line pain most likely due to recurrent medial meniscus tear, left knee    PLAN:  We looked at the intraoperative photographs from the time of previous surgery, February 1, 2017.  He really did not have any significant articular cartilage issue.  Based on his symptoms which are very similar to what he experienced and based on physical examination findings noted today, we anticipate recurrent tear of the medial meniscus in the remaining meniscus from the previous surgery.  To confirm the diagnosis further, MRI scan is recommended.  He understands our discussion.  We will call him with the result when it becomes available.         Yordan Main MD  Dept. Orthopedic Surgery  [facility]       Disclaimer: This note consists of symbols derived from keyboarding, dictation and/or voice recognition software. As a result, there may be errors in the script that have gone undetected. Please consider this when interpreting information found in this chart.

## 2020-02-29 ENCOUNTER — HOSPITAL ENCOUNTER (OUTPATIENT)
Dept: MRI IMAGING | Facility: CLINIC | Age: 40
Discharge: HOME | End: 2020-02-29
Attending: ORTHOPAEDIC SURGERY | Admitting: ORTHOPAEDIC SURGERY
Payer: COMMERCIAL

## 2020-02-29 DIAGNOSIS — S83.242A TEAR OF MEDIAL MENISCUS OF LEFT KNEE, CURRENT, UNSPECIFIED TEAR TYPE, INITIAL ENCOUNTER: ICD-10-CM

## 2020-02-29 PROCEDURE — 73721 MRI JNT OF LWR EXTRE W/O DYE: CPT | Mod: LT

## 2020-03-02 ENCOUNTER — TELEPHONE (OUTPATIENT)
Dept: ORTHOPEDICS | Facility: CLINIC | Age: 40
End: 2020-03-02

## 2020-03-02 DIAGNOSIS — S83.242A OTHER TEAR OF MEDIAL MENISCUS OF LEFT KNEE AS CURRENT INJURY, INITIAL ENCOUNTER: Primary | ICD-10-CM

## 2020-03-02 NOTE — TELEPHONE ENCOUNTER
Reason for Call:  Request for results:    Name of test or procedure: MRI Left knee    Date of test of procedure: 2/29/20    Results:                                                            Impression:  1. Postsurgical changes of partial meniscectomy of the body of the  medial meniscus.  2. Horizontal oblique tear of the posterior horn of the medial  meniscus in the remnant meniscus most consistent with re-tear. Intact  posterior root attachment.  3. Small joint effusion, moderate grade Bakers cyst.  4. Overall no significant articular cartilage abnormalities in all 3  compartments. Preserved ACL, posterior cruciate ligament, medial and  lateral supporting structures and lateral meniscus.       Plan for results from last OV: call patient    OK to leave the result message on voice mail or with a family member? YES on either    Phone number Patient can be reached at:  Cell number on file:    Telephone Information:   Mobile [phone]   Or work cell: [phone]    Please try first number and if no answer, call 2nd cell number.     BRIE Cedeño RN

## 2020-03-03 ENCOUNTER — TELEPHONE (OUTPATIENT)
Dept: ORTHOPEDICS | Facility: CLINIC | Age: 40
End: 2020-03-03

## 2020-03-03 NOTE — TELEPHONE ENCOUNTER
Scheduled surgery.   See surgery telephone encounter for details.       Laquita Ellis, Surgery Scheduler

## 2020-03-03 NOTE — TELEPHONE ENCOUNTER
Scheduled surgery    Type of surgery: left knee arthroscopy (CPT 83973)  Location of surgery: Other: [facility]  Date and time of surgery: 3/11/20 @ 1130am   Surgeon: Etelvina   Pre-Op Appt Date: 3/5/20  Post-Op Appt Date: 3/23/20   Packet sent out: Yes  Pre-cert/Authorization completed:  No  Date: 3/3/20      Laquita Ellis, Surgery Scheduler

## 2020-03-05 ENCOUNTER — OFFICE VISIT (OUTPATIENT)
Dept: FAMILY MEDICINE | Facility: CLINIC | Age: 40
End: 2020-03-05
Payer: COMMERCIAL

## 2020-03-05 VITALS
SYSTOLIC BLOOD PRESSURE: 134 MMHG | WEIGHT: 264 LBS | DIASTOLIC BLOOD PRESSURE: 88 MMHG | HEART RATE: 109 BPM | TEMPERATURE: 96.3 F | HEIGHT: 72 IN | OXYGEN SATURATION: 98 % | BODY MASS INDEX: 35.76 KG/M2

## 2020-03-05 DIAGNOSIS — Z01.818 PREOP GENERAL PHYSICAL EXAM: Primary | ICD-10-CM

## 2020-03-05 DIAGNOSIS — S83.242D TEAR OF MEDIAL MENISCUS OF LEFT KNEE, CURRENT, UNSPECIFIED TEAR TYPE, SUBSEQUENT ENCOUNTER: ICD-10-CM

## 2020-03-05 PROCEDURE — 99214 OFFICE O/P EST MOD 30 MIN: CPT | Performed by: NURSE PRACTITIONER

## 2020-03-05 ASSESSMENT — MIFFLIN-ST. JEOR: SCORE: 2140.5

## 2020-03-05 NOTE — PROGRESS NOTES
[facility] [address] [facility] [address]  [phone]  Dept: [phone]    PRE-OP EVALUATION:  Today's date: 3/5/2020    Paco Ponce (: 1980) presents for pre-operative evaluation assessment as requested by Dr. Main.  He requires evaluation and anesthesia risk assessment prior to undergoing surgery/procedure for treatment of left knee.    Proposed Surgery/ Procedure: Medial meniscus repair  Date of Surgery/ Procedure: 3/11/2020  Time of Surgery/ Procedure: 11:30[facility]/Surgical Facility: [facility]   Fax number for surgical facility:   Primary Physician: Douglas Longo  Type of Anesthesia Anticipated: to be determined    Patient has a Health Care Directive or Living Will:  NO    1. NO - Do you have a history of heart attack, stroke, stent, bypass or surgery on an artery in the head, neck, heart or legs?  2. NO - Do you ever have any pain or discomfort in your chest?  3. NO - Do you have a history of  Heart Failure?  4. NO - Are you troubled by shortness of breath when: walking on the level, up a slight hill or at night?  5. NO - Do you currently have a cold, bronchitis or other respiratory infection?  6. NO - Do you have a cough, shortness of breath or wheezing?  7. NO - Do you sometimes get pains in the calves of your legs when you walk?  8. NO - Do you or anyone in your family have previous history of blood clots?  9. NO - Do you or does anyone in your family have a serious bleeding problem such as prolonged bleeding following surgeries or cuts?  10. NO - Have you ever had problems with anemia or been told to take iron pills?  11. NO - Have you had any abnormal blood loss such as black, tarry or bloody stools, or abnormal vaginal bleeding?  12. NO - Have you ever had a blood transfusion?  13. NO - Have you or any of your relatives ever had problems with anesthesia?  14. NO - Do you have sleep apnea, excessive snoring or  daytime drowsiness?  15. NO - Do you have any prosthetic heart valves?  16. NO - Do you have prosthetic joints?  17. NO - Is there any chance that you may be pregnant?      HPI:     HPI related to upcoming procedure: previous meniscal tear and repair in 2017. Started to have similar pain several weeks ago and MRI showed re-tear left medial meniscus. Previous surgery went well no bleeding or anesthesia problems. Good exercise tolerance and no recent fevers, illness.       HYPERTENSION - Patient has longstanding history of HTN , currently denies any symptoms referable to elevated blood pressure. Specifically denies chest pain, palpitations, dyspnea, orthopnea, PND or peripheral edema. Blood pressure readings have been in normal range. Current medication regimen is as listed below. Patient denies any side effects of medication.       MEDICAL HISTORY:     Patient Active Problem List    Diagnosis Date Noted     Anxiety 04/07/2018     Priority: Medium     Major depressive disorder 04/07/2018     Priority: Medium     Kidney stone 09/25/2017     Priority: Medium     Migraine with aura and without status migrainosus, not intractable 03/06/2017     Priority: Medium     Morbid obesity due to excess calories (H) 01/30/2017     Priority: Medium     Tear of medial meniscus of left knee, current, unspecified tear type, subsequent encounter 01/30/2017     Priority: Medium     CARDIOVASCULAR SCREENING; LDL GOAL LESS THAN 160 01/06/2015     Priority: Medium     Hypertension goal BP (blood pressure) < 140/90 01/06/2015     Priority: Medium      Past Medical History:   Diagnosis Date     Anxiety      Bowel obstruction (H)      Diverticulitis      Hypertension      Kidney stone      Major depressive disorder      Migraine      Obesity      Past Surgical History:   Procedure Laterality Date     APPENDECTOMY       ARTHROSCOPY KNEE Left 02/01/2017    Procedure: Left knee arthroscopy and partial medial meniscectomy. Surgeon:  Yordan Main  MD Carla  Location: [facility]     ENT SURGERY       HERNIA REPAIR       Current Outpatient Medications   Medication Sig Dispense Refill     hydrochlorothiazide (HYDRODIURIL) 25 MG tablet TAKE 1 TABLET BY MOUTH EVERY DAY 90 tablet 2     lisinopril (PRINIVIL/ZESTRIL) 10 MG tablet TAKE 1 TABLET BY MOUTH EVERY DAY 90 tablet 2     venlafaxine (EFFEXOR-ER) 225 MG 24 hr tablet Take 1 tablet (225 mg) by mouth daily 30 tablet 0     OTC products: None, except as noted above    Allergies   Allergen Reactions     Amoxicillin Anaphylaxis     Tessalon [Benzonatate] Anaphylaxis      Latex Allergy: NO    Social History     Tobacco Use     Smoking status: Never Smoker     Smokeless tobacco: Never Used   Substance Use Topics     Alcohol use: Yes     Alcohol/week: 0.0 standard drinks     Comment: per wk 1-2     History   Drug Use No       REVIEW OF SYSTEMS:   CONSTITUTIONAL: NEGATIVE for fever, chills, change in weight  INTEGUMENTARY/SKIN: NEGATIVE for worrisome rashes, moles or lesions  EYES: NEGATIVE for vision changes or irritation  ENT/MOUTH: NEGATIVE for ear, mouth and throat problems  RESP: NEGATIVE for significant cough or SOB  BREAST: NEGATIVE for masses, tenderness or discharge  CV: NEGATIVE for chest pain, palpitations or peripheral edema  GI: NEGATIVE for nausea, abdominal pain, heartburn, or change in bowel habits  : NEGATIVE for frequency, dysuria, or hematuria  MUSCULOSKELETAL: NEGATIVE for significant arthralgias or myalgia  NEURO: NEGATIVE for weakness, dizziness or paresthesias  ENDOCRINE: NEGATIVE for temperature intolerance, skin/hair changes  HEME: NEGATIVE for bleeding problems  PSYCHIATRIC: NEGATIVE for changes in mood or affect    EXAM:   /88   Pulse 109   Temp 96.3  F (35.7  C) (Tympanic)   Ht 1.829 m (6')   Wt 119.7 kg (264 lb)   SpO2 98%   BMI 35.80 kg/m      GENERAL APPEARANCE: healthy, alert and no distress     EYES: EOMI,  PERRL     HENT: ear canals and TM's normal and nose and  mouth without ulcers or lesions     NECK: no adenopathy, no asymmetry, masses, or scars and thyroid normal to palpation     RESP: lungs clear to auscultation - no rales, rhonchi or wheezes     CV: regular rates and rhythm, normal S1 S2, no S3 or S4 and no murmur, click or rub     ABDOMEN:  soft, nontender, no HSM or masses and bowel sounds normal     MS: extremities normal- no gross deformities noted, no evidence of inflammation in joints, FROM in all extremities.     SKIN: no suspicious lesions or rashes     NEURO: Normal strength and tone, sensory exam grossly normal, mentation intact and speech normal     PSYCH: mentation appears normal. and affect normal/bright     LYMPHATICS: No cervical adenopathy    DIAGNOSTICS:   No labs or EKG required for surgery.    Recent Labs   Lab Test 05/22/19  0916 04/30/19  1458 12/23/18  1122   HGB 15.1  --  14.5     --  227    140  --    POTASSIUM 4.4 4.4  --    CR 1.06 1.28*  --         IMPRESSION:   Reason for surgery/procedure: left medial meniscal tear  Diagnosis/reason for consult: preoperative exam    The proposed surgical procedure is considered INTERMEDIATE risk.    REVISED CARDIAC RISK INDEX  The patient has the following serious cardiovascular risks for perioperative complications such as (MI, PE, VFib and 3  AV Block):  No serious cardiac risks  INTERPRETATION: 0 risks: Class I (very low risk - 0.4% complication rate)    The patient has the following additional risks for perioperative complications:  No identified additional risks      ICD-10-CM    1. Preop general physical exam Z01.818    2. Tear of medial meniscus of left knee, current, unspecified tear type, subsequent encounter S83.242D        RECOMMENDATIONS:         --Patient is to take all scheduled medications on the day of surgery EXCEPT for modifications listed below.    Anticoagulant or Antiplatelet Medication Use  NSAIDS: Ibuprofen (Motrin):         Stop one day prior to surgery        APPROVAL  GIVEN to proceed with proposed procedure, without further diagnostic evaluation           Signed Electronically by: Fartun Rosales CNP    Copy of this evaluation report is provided to requesting physician.    [facility] Preop Guidelines    Revised Cardiac Risk Index

## 2020-03-11 ENCOUNTER — TRANSFERRED RECORDS (OUTPATIENT)
Dept: HEALTH INFORMATION MANAGEMENT | Facility: CLINIC | Age: 40
End: 2020-03-11

## 2020-03-11 DIAGNOSIS — F33.9 RECURRENT MAJOR DEPRESSIVE DISORDER, REMISSION STATUS UNSPECIFIED (H): ICD-10-CM

## 2020-03-12 ENCOUNTER — TELEPHONE (OUTPATIENT)
Dept: ORTHOPEDICS | Facility: CLINIC | Age: 40
End: 2020-03-12

## 2020-03-12 NOTE — TELEPHONE ENCOUNTER
Spoke with patient.  Doing well, no questions at this time.  Offered number for nurse triage and confirmed follow up appointment.    Emiliano Gallardo PA-C  [facility] - Surgery

## 2020-03-12 NOTE — TELEPHONE ENCOUNTER
Routing refill request to provider for review/approval because:  Labs not current:  [id number]    IVAN RegaladoN, RN  [employer] [facility]

## 2020-03-13 RX ORDER — VENLAFAXINE HYDROCHLORIDE 225 MG/1
225 TABLET, EXTENDED RELEASE ORAL DAILY
Qty: 30 TABLET | Refills: 0 | Status: SHIPPED | OUTPATIENT
Start: 2020-03-13 | End: 2020-03-19

## 2020-03-17 ENCOUNTER — VIRTUAL VISIT (OUTPATIENT)
Dept: FAMILY MEDICINE | Facility: CLINIC | Age: 40
End: 2020-03-17
Payer: COMMERCIAL

## 2020-03-17 DIAGNOSIS — F33.9 RECURRENT MAJOR DEPRESSIVE DISORDER, REMISSION STATUS UNSPECIFIED (H): ICD-10-CM

## 2020-03-17 PROCEDURE — 99207 ZZC NO CHARGE LOS: CPT | Performed by: FAMILY MEDICINE

## 2020-03-17 ASSESSMENT — ANXIETY QUESTIONNAIRES
GAD7 TOTAL SCORE: 1
5. BEING SO RESTLESS THAT IT IS HARD TO SIT STILL: NOT AT ALL
IF YOU CHECKED OFF ANY PROBLEMS ON THIS QUESTIONNAIRE, HOW DIFFICULT HAVE THESE PROBLEMS MADE IT FOR YOU TO DO YOUR WORK, TAKE CARE OF THINGS AT HOME, OR GET ALONG WITH OTHER PEOPLE: NOT DIFFICULT AT ALL
7. FEELING AFRAID AS IF SOMETHING AWFUL MIGHT HAPPEN: NOT AT ALL
6. BECOMING EASILY ANNOYED OR IRRITABLE: SEVERAL DAYS
1. FEELING NERVOUS, ANXIOUS, OR ON EDGE: NOT AT ALL
2. NOT BEING ABLE TO STOP OR CONTROL WORRYING: NOT AT ALL
3. WORRYING TOO MUCH ABOUT DIFFERENT THINGS: NOT AT ALL

## 2020-03-17 ASSESSMENT — PATIENT HEALTH QUESTIONNAIRE - PHQ9
SUM OF ALL RESPONSES TO PHQ QUESTIONS 1-9: 1
5. POOR APPETITE OR OVEREATING: NOT AT ALL

## 2020-03-17 NOTE — PROGRESS NOTES
"Paco Ponce is a 39 year old male who is being evaluated via a billable telephone visit.      The patient has been notified of following:     \"This telephone visit will be conducted via a call between you and your physician/provider. We have found that certain health care needs can be provided without the need for a physical exam.  This service lets us provide the care you need with a short phone conversation.  If a prescription is necessary we can send it directly to your pharmacy.  If lab work is needed we can place an order for that and you can then stop by our lab to have the test done at a later time.    If during the course of the call the physician/provider feels a telephone visit is not appropriate, you will not be charged for this service.\"       Paco Ponce complains of  No chief complaint on file.      I have reviewed and updated the patient's Past Medical History, Social History, Family History and Medication List.    ALLERGIES  Amoxicillin and Tessalon [benzonatate]    Meme Valle MA     (MA signature)    Depression Followup    How are you doing with your depression since your last visit? Its going good -feels like he found the correct dose.    Are you having other symptoms that might be associated with depression? No    Have you had a significant life event?  No     Are you feeling anxious or having panic attacks?   No    Do you have any concerns with your use of alcohol or other drugs? No    He states that not only is his mood improved but his body isn't hurting as much.    Social History     Tobacco Use     Smoking status: Never Smoker     Smokeless tobacco: Never Used   Substance Use Topics     Alcohol use: Yes     Alcohol/week: 0.0 standard drinks     Comment: per wk 1-2     Drug use: No     PHQ 4/19/2019 2/17/2020 3/17/2020   PHQ-9 Total Score 2 6 1   Q9: Thoughts of better off dead/self-harm past 2 weeks Not at all Not at all Not at all   F/U: Thoughts of suicide or " self-harm - - -   F/U: Safety concerns - - -     MAAME-7 SCORE 7/10/2018 4/19/2019 3/17/2020   Total Score 2 3 1       Suicide Assessment Five-step Evaluation and Treatment (SAFE-T)      Assessment/Plan:  1. Recurrent major depressive disorder, remission status unspecified (H) mediction working well. Continue Effexor. Follow up in 6 months.  - venlafaxine (EFFEXOR-ER) 225 MG 24 hr tablet; Take 1 tablet (225 mg) by mouth daily  Dispense: 90 tablet; Refill: 1        I have reviewed the note as documented above.  This accurately captures the substance of my conversation with the patient.    Phone call contact time: 2 minutes    Douglas Longo,         '

## 2020-03-18 ASSESSMENT — ANXIETY QUESTIONNAIRES: GAD7 TOTAL SCORE: 1

## 2020-03-19 RX ORDER — VENLAFAXINE HYDROCHLORIDE 225 MG/1
225 TABLET, EXTENDED RELEASE ORAL DAILY
Qty: 90 TABLET | Refills: 1 | Status: SHIPPED | OUTPATIENT
Start: 2020-03-19 | End: 2020-10-06

## 2020-03-23 ENCOUNTER — OFFICE VISIT (OUTPATIENT)
Dept: ORTHOPEDICS | Facility: CLINIC | Age: 40
End: 2020-03-23
Payer: COMMERCIAL

## 2020-03-23 ENCOUNTER — TELEPHONE (OUTPATIENT)
Dept: ORTHOPEDICS | Facility: CLINIC | Age: 40
End: 2020-03-23

## 2020-03-23 VITALS
DIASTOLIC BLOOD PRESSURE: 86 MMHG | SYSTOLIC BLOOD PRESSURE: 132 MMHG | HEIGHT: 72 IN | BODY MASS INDEX: 35.76 KG/M2 | WEIGHT: 264 LBS

## 2020-03-23 DIAGNOSIS — Z48.89 POSTOPERATIVE VISIT: Primary | ICD-10-CM

## 2020-03-23 PROCEDURE — 99024 POSTOP FOLLOW-UP VISIT: CPT | Performed by: ORTHOPAEDIC SURGERY

## 2020-03-23 ASSESSMENT — MIFFLIN-ST. JEOR: SCORE: 2140.5

## 2020-03-23 NOTE — LETTER
3/23/2020         RE: Paco Ponce  [address]  [place] [address]        Dear Colleague,    Thank you for referring your patient, Paco Ponce, to the [facility] ORTHOPEDIC SURGERY. Please see a copy of my visit note below.    Subjective:  Paco Ponce is a 39 year old male who is seen in f/u up for left medial meniscectomy, DOS: 3/11/20, Dr. Yordan Main.  Patient reports the knee is doing well.  Patient reports that he has.   Patient reports not sleeping well, he states that the knee will wake him at night time. Has been using pillows to prop knee.   Current pain level: 2/10, Worst pain level: 8/10-- acute short spike in pain with twisting, or walking.     Current treatments: icing, no OTC pain medications,       Objective:   He walks with a mild limping  Portal sites have healed well  No swelling noted  No erythema  Full range of motion  Neurovascular status is intact    Imaging studies: None taken today    Assessment: Status post left knee arthroscopy for medial meniscus tear of degenerative nature  Very minimal medial compartmental chondromalacia      Plan: With ongoing pain, he does not feel that he is ready to go back to work which involves a lot of crawling.  He will be released to full duty as of April 6, 2020 which will be 2 weeks from now  We went over the intraoperative photographs and explained the findings and what was done.  Follow-up as needed      Yordan Main MD  Dept. Orthopedic Surgery  [facility]       Disclaimer: This note consists of symbols derived from keyboarding, dictation and/or voice recognition software. As a result, there may be errors in the script that have gone undetected. Please consider this when interpreting information found in this chart.      Again, thank you for allowing me to participate in the care of your patient.        Sincerely,        Yordan Main MD

## 2020-03-23 NOTE — LETTER
March 24, 2020      Paco Ponce  [address]  [facility] [address]        To Whom It May Concern:    Paco Ponce was seen in our clinic. He may return to work without restrictions on April 6th, 2020.      Sincerely,            Yordan Main MD

## 2020-03-23 NOTE — TELEPHONE ENCOUNTER
Reason for Call:  Form, our goal is to have forms completed with 7 days, however, some forms may require a visit or additional information.    Type of letter, form or note:  short term disability     Who is the form from?: Patient    Where did the form come from: Patient or family brought in       Phone number of person requesting form: [phone]  Can we leave a detailed message on this number:  Not Applicable    Desired completion date of form: 3/23/20      How will form be returned?:  fax to [phone]    Has the patient signed a consent form for release of information (may be included with form)? YES    Additional comments: left knee arthroscopy, medial meniscectomy, DOS: 3/11/20    Form was started and faxed attn to Guanaco Daniels.   Copy of form sent to denise Bonilla ATC

## 2020-03-23 NOTE — PROGRESS NOTES
Subjective:  Paco Ponce is a 39 year old male who is seen in f/u up for left medial meniscectomy, DOS: 3/11/20, Dr. Yordan Main.  Patient reports the knee is doing well.  Patient reports that he has.   Patient reports not sleeping well, he states that the knee will wake him at night time. Has been using pillows to prop knee.   Current pain level: 2/10, Worst pain level: 8/10-- acute short spike in pain with twisting, or walking.     Current treatments: icing, no OTC pain medications,       Objective:   He walks with a mild limping  Portal sites have healed well  No swelling noted  No erythema  Full range of motion  Neurovascular status is intact    Imaging studies: None taken today    Assessment: Status post left knee arthroscopy for medial meniscus tear of degenerative nature  Very minimal medial compartmental chondromalacia      Plan: With ongoing pain, he does not feel that he is ready to go back to work which involves a lot of crawling.  He will be released to full duty as of April 6, 2020 which will be 2 weeks from now  We went over the intraoperative photographs and explained the findings and what was done.  Follow-up as needed      Yordan Main MD  Dept. Orthopedic Surgery  [facility]       Disclaimer: This note consists of symbols derived from keyboarding, dictation and/or voice recognition software. As a result, there may be errors in the script that have gone undetected. Please consider this when interpreting information found in this chart.

## 2020-07-08 ENCOUNTER — NURSE TRIAGE (OUTPATIENT)
Dept: NURSING | Facility: CLINIC | Age: 40
End: 2020-07-08

## 2020-07-08 ENCOUNTER — VIRTUAL VISIT (OUTPATIENT)
Dept: FAMILY MEDICINE | Facility: CLINIC | Age: 40
End: 2020-07-08
Payer: COMMERCIAL

## 2020-07-08 DIAGNOSIS — R50.9 FEVER AND CHILLS: ICD-10-CM

## 2020-07-08 DIAGNOSIS — R51.9 ACUTE NONINTRACTABLE HEADACHE, UNSPECIFIED HEADACHE TYPE: Primary | ICD-10-CM

## 2020-07-08 DIAGNOSIS — R52 BODY ACHES: ICD-10-CM

## 2020-07-08 PROCEDURE — 99213 OFFICE O/P EST LOW 20 MIN: CPT | Mod: TEL | Performed by: NURSE PRACTITIONER

## 2020-07-08 NOTE — PROGRESS NOTES
"Paco Ponce is a 40 year old male who is being evaluated via a billable telephone visit.      The patient has been notified of following:     \"This telephone visit will be conducted via a call between you and your physician/provider. We have found that certain health care needs can be provided without the need for a physical exam.  This service lets us provide the care you need with a short phone conversation.  If a prescription is necessary we can send it directly to your pharmacy.  If lab work is needed we can place an order for that and you can then stop by our lab to have the test done at a later time.    Telephone visits are billed at different rates depending on your insurance coverage. During this emergency period, for some insurers they may be billed the same as an in-person visit.  Please reach out to your insurance provider with any questions.    If during the course of the call the physician/provider feels a telephone visit is not appropriate, you will not be charged for this service.\"    Patient has given verbal consent for Telephone visit?  Yes    What phone number would you like to be contacted at? [phone]    How would you like to obtain your AVS? MyChart    Subjective     Paco Ponce is a 40 year old male who presents via phone visit today for the following health issues:      Acute Illness   Acute illness concerns: fever and chills  Onset: this morning    Fever: YES- 100.1     Chills/Sweats: YES    Headache (location?): YES    Sinus Pressure:no    Conjunctivitis:  no    Ear Pain: no    Rhinorrhea: no    Congestion: no    Sore Throat: no     Cough: no    Wheeze: no    Decreased Appetite: no    Nausea: no    Vomiting: no    Diarrhea:  no    Dysuria/Freq.: no    Fatigue/Achiness: YES    Sick/Strep Exposure: no     Therapies Tried and outcome:     HPI: Woke up at 3 am this morning with fever, chills, body aches. Fever was 100.1 (now 99). He also notes headache. No cough, wheeze, SOB, " GI symptoms, rash. No close contacts with similar symptoms.     Patient Active Problem List   Diagnosis     CARDIOVASCULAR SCREENING; LDL GOAL LESS THAN 160     Hypertension goal BP (blood pressure) < 140/90     Morbid obesity due to excess calories (H)     Tear of medial meniscus of left knee, current, unspecified tear type, subsequent encounter     Migraine with aura and without status migrainosus, not intractable     Kidney stone     Anxiety     Major depressive disorder     Past Surgical History:   Procedure Laterality Date     APPENDECTOMY       ARTHROSCOPY KNEE Left 02/01/2017    Procedure: Left knee arthroscopy and partial medial meniscectomy. Surgeon:  Yordan Main MD  Location: [facility]     ENT SURGERY       HERNIA REPAIR         Social History     Tobacco Use     Smoking status: Never Smoker     Smokeless tobacco: Never Used   Substance Use Topics     Alcohol use: Yes     Alcohol/week: 0.0 standard drinks     Comment: per wk 1-2     History reviewed. No pertinent family history.        Reviewed and updated as needed this visit by Provider  Tobacco  Allergies  Meds  Problems  Med Hx  Surg Hx  Fam Hx         Review of Systems   Constitutional, HEENT, pulmonary, GI, musculoskeletal, skin systems are negative, except as otherwise noted.       Objective   Reported vitals:  There were no vitals taken for this visit.   alert and no distress  PSYCH: Alert and oriented times 3; coherent speech, normal   rate and volume, able to articulate logical thoughts, able   to abstract reason, no tangential thoughts, no hallucinations   or delusions  His affect is normal  RESP: No cough, no audible wheezing, able to talk in full sentences  Remainder of exam unable to be completed due to telephone visits    Diagnostic Test Results:  Labs reviewed in Epic        Assessment/Plan:  1. Acute nonintractable headache, unspecified headache type  Comment: Will get COVID test. No red flags to prompt  recommendation to present to ED. Symptomatic cares (see patient instructions), isolation instructions, and follow up precautions discussed in detail. Paco acknowledges and demonstrates understanding of circumstances under which care should be sought urgently or emergently. Follow up as discussed.   - Symptomatic COVID-19 Virus (Coronavirus) by PCR; Future    2. Body aches  Comment: Will get COVID test. No red flags to prompt recommendation to present to ED. Symptomatic cares (see patient instructions), isolation instructions, and follow up precautions discussed in detail. Paco acknowledges and demonstrates understanding of circumstances under which care should be sought urgently or emergently. Follow up as discussed.   - Symptomatic COVID-19 Virus (Coronavirus) by PCR; Future    3. Fever and chills  Comment: Will get COVID test. No red flags to prompt recommendation to present to ED. Symptomatic cares (see patient instructions), isolation instructions, and follow up precautions discussed in detail. Paco acknowledges and demonstrates understanding of circumstances under which care should be sought urgently or emergently. Follow up as discussed.   - Symptomatic COVID-19 Virus (Coronavirus) by PCR; Future    Return in about 1 day (around 7/9/2020) for persistent or worsening symptoms.      Phone call duration:  5 minutes    Jerson Leong NP

## 2020-07-08 NOTE — TELEPHONE ENCOUNTER
Paco states that last night woke up with body pains/aches and took his temp and was 102 and now is 99.  Headache is also present.  Denies cough.  Denies shortness of breath.      FNA advised [email or web address].    COVID 19 Nurse Triage Plan/Patient Instructions    Please be aware that novel coronavirus (COVID-19) may be circulating in the community. If you develop symptoms such as fever, cough, or SOB or if you have concerns about the presence of another infection including coronavirus (COVID-19), please contact your health care provider or visit [email or web address].     Disposition/Instructions    Patient to stay at home and follow home care protocol based instructions.     Thank you for taking steps to prevent the spread of this virus.  o Limit your contact with others.  o Wear a simple mask to cover your cough.  o Wash your hands well and often.    Resources    [facility]: About COVID-19: [email or web address]    CDC: What to Do If You're Sick: www.cdc.gov/coronavirus/2019-ncov/about/steps-when-sick.html    CDC: Ending Home Isolation: www.cdc.gov/coronavirus/2019-ncov/hcp/disposition-in-home-patients.html     CDC: Caring for Someone: www.cdc.gov/coronavirus/2019-ncov/if-you-are-sick/care-for-someone.html     [facility]: Interim Guidance for Hospital Discharge to Home: www.health.[facility].[place]/diseases/coronavirus/hcp/hospdischarge.pdf    [facility] clinical trials (COVID-19 research studies): [email or web address]     Below are the COVID-19 hotlines at the [place] Department of Health ([facility]). Interpreters are available.   o For health questions: Call 098-411-4049 or 1-433.436.2350 (7 a.m. to 7 p.m.)  o For questions about schools and childcare: Call 977-403-2806 or 1-829.855.3250 (7 a.m. to 7 p.m.)                     Reason for Disposition    [1] COVID-19 infection suspected by caller or triager AND [2] mild symptoms (cough, fever, or others) AND [3] no complications or  SOB    Additional Information    Negative: SEVERE difficulty breathing (e.g., struggling for each breath, speaks in single words)    Negative: Difficult to awaken or acting confused (e.g., disoriented, slurred speech)    Negative: Bluish (or gray) lips or face now    Negative: Shock suspected (e.g., cold/pale/clammy skin, too weak to stand, low BP, rapid pulse)    Negative: Sounds like a life-threatening emergency to the triager    Negative: SEVERE or constant chest pain or pressure (Exception: mild central chest pain, present only when coughing)    Negative: MODERATE difficulty breathing (e.g., speaks in phrases, SOB even at rest, pulse 100-120)    Negative: Patient sounds very sick or weak to the triager    Negative: MILD difficulty breathing (e.g., minimal/no SOB at rest, SOB with walking, pulse <100)    Negative: Chest pain or pressure    Negative: Fever > 103 F (39.4 C)    Negative: [1] Fever > 101 F (38.3 C) AND [2] age > 60    Negative: [1] Fever > 100.0 F (37.8 C) AND [2] bedridden (e.g., nursing home patient, CVA, chronic illness, recovering from surgery)    Negative: HIGH RISK patient (e.g., age > 64 years, diabetes, heart or lung disease, weak immune system)    Negative: Fever present > 3 days (72 hours)    Negative: [1] Fever returns after gone for over 24 hours AND [2] symptoms worse or not improved    Negative: [1] Continuous (nonstop) coughing interferes with work or school AND [2] no improvement using cough treatment per protocol    Protocols used: CORONAVIRUS (COVID-19) DIAGNOSED OR [id number] 5.16.20

## 2020-07-09 DIAGNOSIS — R52 BODY ACHES: ICD-10-CM

## 2020-07-09 DIAGNOSIS — R51.9 ACUTE NONINTRACTABLE HEADACHE, UNSPECIFIED HEADACHE TYPE: ICD-10-CM

## 2020-07-09 DIAGNOSIS — R50.9 FEVER AND CHILLS: ICD-10-CM

## 2020-07-09 PROCEDURE — U0003 INFECTIOUS AGENT DETECTION BY NUCLEIC ACID (DNA OR RNA); SEVERE ACUTE RESPIRATORY SYNDROME CORONAVIRUS 2 (SARS-COV-2) (CORONAVIRUS DISEASE [COVID-19]), AMPLIFIED PROBE TECHNIQUE, MAKING USE OF HIGH THROUGHPUT TECHNOLOGIES AS DESCRIBED BY CMS-2020-01-R: HCPCS | Performed by: NURSE PRACTITIONER

## 2020-07-09 NOTE — LETTER
July 13, 2020        Paco Ponce  [address]  [place] [address]    This letter provides a written record that you were tested for COVID-19 on 7/9/20.       Your result was negative. This means that we didn t find the virus that causes COVID-19 in your sample. A test may show negative when you do actually have the virus. This can happen when the virus is in the early stages of infection, before you feel illness symptoms.    If you have symptoms   Stay home and away from others (self-isolate) until you meet ALL of the guidelines below:    You ve had no fever--and no medicine that reduces fever--for 3 full days (72 hours). And      Your other symptoms have gotten better. For example, your cough or breathing has improved. And     At least 10 days have passed since your symptoms started.    During this time:    Stay home. Don t go to work, school or anywhere else.     Stay in your own room, including for meals. Use your own bathroom if you can.    Stay away from others in your home. No hugging, kissing or shaking hands. No visitors.    Clean  high touch  surfaces often (doorknobs, counters, handles, etc.). Use a household cleaning spray or wipes. You can find a full list on the EPA website at www.epa.gov/pesticide-registration/list-n-disinfectants-use-against-sars-cov-2.    Cover your mouth and nose with a mask, tissue or washcloth to avoid spreading germs.    Wash your hands and face often with soap and water.    Going back to work  Check with your employer for any guidelines to follow for going back to work.    Employers: This document serves as formal notice that your employee tested negative for COVID-19, as of the testing date shown above.

## 2020-07-10 LAB
SARS-COV-2 RNA SPEC QL NAA+PROBE: NOT DETECTED
SPECIMEN SOURCE: NORMAL

## 2020-09-08 ENCOUNTER — OFFICE VISIT (OUTPATIENT)
Dept: FAMILY MEDICINE | Facility: CLINIC | Age: 40
End: 2020-09-08
Payer: COMMERCIAL

## 2020-09-08 VITALS
HEIGHT: 72 IN | TEMPERATURE: 97.9 F | SYSTOLIC BLOOD PRESSURE: 132 MMHG | HEART RATE: 103 BPM | BODY MASS INDEX: 36.03 KG/M2 | DIASTOLIC BLOOD PRESSURE: 82 MMHG | OXYGEN SATURATION: 98 % | WEIGHT: 266 LBS

## 2020-09-08 DIAGNOSIS — H61.22 IMPACTED CERUMEN OF LEFT EAR: Primary | ICD-10-CM

## 2020-09-08 PROCEDURE — 69209 REMOVE IMPACTED EAR WAX UNI: CPT | Mod: 50 | Performed by: PHYSICIAN ASSISTANT

## 2020-09-08 ASSESSMENT — MIFFLIN-ST. JEOR: SCORE: 2144.57

## 2020-09-08 NOTE — PROGRESS NOTES
Subjective     Paco Ponce is a 40 year old male who presents to clinic today for the following health issues:  Acute Illness  Acute illness concerns: Ear problems   Onset/Duration: this morniing  Symptoms:  Fever: no  Chills/Sweats: no  Headache (location?): no  Sinus Pressure: no  Conjunctivitis:  no  Ear Pain: YES: bilateral - fullness/plugged   Rhinorrhea: no  Congestion: no  Sore Throat: no  Cough: no  Wheeze: no  Decreased Appetite: no  Nausea: no  Vomiting: no  Diarrhea: no  Dysuria/Freq.: no  Dysuria or Hematuria: no  Fatigue/Achiness: no  Sick/Strep Exposure: no  Therapies tried and outcome: None    History of Present Illness        He eats 0-1 servings of fruits and vegetables daily.He consumes 2 sweetened beverage(s) daily.He exercises with enough effort to increase his heart rate 10 to 19 minutes per day.  He exercises with enough effort to increase his heart rate 3 or less days per week.   He is taking medications regularly.     Left ear feels plugged. Also believes that R ear is a little plugged.  No URI symptoms  History of cerumen impaction. Can't remember when his last irrigation was.    Uses a wax remover that he got off of [employer]    Review of Systems   See HPI      Objective    /82   Pulse 103   Temp 97.9  F (36.6  C) (Tympanic)   Ht 1.829 m (6')   Wt 120.7 kg (266 lb)   SpO2 98%   BMI 36.08 kg/m    Body mass index is 36.08 kg/m .  Physical Exam   GENERAL: healthy, alert and no distress  HENT: normal cephalic/atraumatic, right ear: moderate amount of wax and left ear: occluded with wax  SKIN: no suspicious lesions or rashes    Diagnostics: None        Assessment & Plan     Impacted cerumen of left ear  Left ear successfully irrigated in clinic, with improvement in hearing. He tolerated the procedure well (denies pain or dizziness). We also flushed the right ear, since there was a moderate amount of wax present today. TMs visualized after the procedure and are gray and intact.  Follow-up PRN.  - HC REMOVAL IMPACTED CERUMEN IRRIGATION/LVG UNILAT      No follow-ups on file.    Bianka Saavedra PA-C  [facility]

## 2020-10-04 ENCOUNTER — MYC MEDICAL ADVICE (OUTPATIENT)
Dept: URGENT CARE | Facility: URGENT CARE | Age: 40
End: 2020-10-04

## 2020-10-04 DIAGNOSIS — F33.9 RECURRENT MAJOR DEPRESSIVE DISORDER, REMISSION STATUS UNSPECIFIED (H): ICD-10-CM

## 2020-10-06 RX ORDER — VENLAFAXINE HYDROCHLORIDE 225 MG/1
225 TABLET, EXTENDED RELEASE ORAL DAILY
Qty: 90 TABLET | Refills: 1 | Status: SHIPPED | OUTPATIENT
Start: 2020-10-06 | End: 2021-04-01

## 2020-10-06 NOTE — TELEPHONE ENCOUNTER
Routing refill request to provider for review/approval because:    PHQ 4/19/2019 2/17/2020 3/17/2020   PHQ-9 Total Score 2 6 1   Q9: Thoughts of better off dead/self-harm past 2 weeks Not at all Not at all Not at all   F/U: Thoughts of suicide or self-harm - - -   F/U: Safety concerns - - -     Abnormal CR.  LOV was 3/17/20

## 2020-10-06 NOTE — TELEPHONE ENCOUNTER
PHQ 4/19/2019 2/17/2020 3/17/2020   PHQ-9 Total Score 2 6 1   Q9: Thoughts of better off dead/self-harm past 2 weeks Not at all Not at all Not at all   F/U: Thoughts of suicide or self-harm - - -   F/U: Safety concerns - - -

## 2020-12-03 ENCOUNTER — ALLIED HEALTH/NURSE VISIT (OUTPATIENT)
Dept: NURSING | Facility: CLINIC | Age: 40
End: 2020-12-03
Payer: COMMERCIAL

## 2020-12-03 DIAGNOSIS — Z23 NEED FOR VACCINATION: Primary | ICD-10-CM

## 2020-12-03 PROCEDURE — 90707 MMR VACCINE SC: CPT

## 2020-12-03 PROCEDURE — 90472 IMMUNIZATION ADMIN EACH ADD: CPT

## 2020-12-03 PROCEDURE — 90471 IMMUNIZATION ADMIN: CPT

## 2020-12-03 PROCEDURE — 90686 IIV4 VACC NO PRSV 0.5 ML IM: CPT

## 2020-12-09 ENCOUNTER — HOSPITAL ENCOUNTER (EMERGENCY)
Facility: CLINIC | Age: 40
Discharge: HOME | End: 2020-12-09
Attending: EMERGENCY MEDICINE | Admitting: EMERGENCY MEDICINE
Payer: COMMERCIAL

## 2020-12-09 ENCOUNTER — APPOINTMENT (OUTPATIENT)
Dept: GENERAL RADIOLOGY | Facility: CLINIC | Age: 40
End: 2020-12-09
Attending: EMERGENCY MEDICINE
Payer: COMMERCIAL

## 2020-12-09 ENCOUNTER — APPOINTMENT (OUTPATIENT)
Dept: CT IMAGING | Facility: CLINIC | Age: 40
End: 2020-12-09
Attending: EMERGENCY MEDICINE
Payer: COMMERCIAL

## 2020-12-09 VITALS
SYSTOLIC BLOOD PRESSURE: 133 MMHG | WEIGHT: 260 LBS | TEMPERATURE: 97.5 F | BODY MASS INDEX: 35.26 KG/M2 | HEART RATE: 73 BPM | OXYGEN SATURATION: 99 % | DIASTOLIC BLOOD PRESSURE: 95 MMHG

## 2020-12-09 DIAGNOSIS — M54.6 ACUTE BILATERAL THORACIC BACK PAIN: ICD-10-CM

## 2020-12-09 LAB
ALBUMIN SERPL-MCNC: 4.2 G/DL (ref 3.4–5)
ALBUMIN UR-MCNC: NEGATIVE MG/DL
ALP SERPL-CCNC: 103 U/L (ref 40–150)
ALT SERPL W P-5'-P-CCNC: 31 U/L (ref 0–70)
ANION GAP SERPL CALCULATED.3IONS-SCNC: 3 MMOL/L (ref 3–14)
APPEARANCE UR: CLEAR
AST SERPL W P-5'-P-CCNC: 24 U/L (ref 0–45)
BASOPHILS # BLD AUTO: 0 10E9/L (ref 0–0.2)
BASOPHILS NFR BLD AUTO: 0.4 %
BILIRUB SERPL-MCNC: 0.4 MG/DL (ref 0.2–1.3)
BILIRUB UR QL STRIP: NEGATIVE
BUN SERPL-MCNC: 17 MG/DL (ref 7–30)
CALCIUM SERPL-MCNC: 9.7 MG/DL (ref 8.5–10.1)
CHLORIDE SERPL-SCNC: 104 MMOL/L (ref 94–109)
CO2 SERPL-SCNC: 29 MMOL/L (ref 20–32)
COLOR UR AUTO: NORMAL
CREAT SERPL-MCNC: 1.07 MG/DL (ref 0.66–1.25)
D DIMER PPP FEU-MCNC: 0.3 UG/ML FEU (ref 0–0.5)
DIFFERENTIAL METHOD BLD: NORMAL
EOSINOPHIL # BLD AUTO: 0.1 10E9/L (ref 0–0.7)
EOSINOPHIL NFR BLD AUTO: 2.3 %
ERYTHROCYTE [DISTWIDTH] IN BLOOD BY AUTOMATED COUNT: 12.4 % (ref 10–15)
GFR SERPL CREATININE-BSD FRML MDRD: 85 ML/MIN/{1.73_M2}
GLUCOSE SERPL-MCNC: 147 MG/DL (ref 70–99)
GLUCOSE UR STRIP-MCNC: NEGATIVE MG/DL
HCT VFR BLD AUTO: 45.8 % (ref 40–53)
HGB BLD-MCNC: 15.5 G/DL (ref 13.3–17.7)
HGB UR QL STRIP: NEGATIVE
IMM GRANULOCYTES # BLD: 0 10E9/L (ref 0–0.4)
IMM GRANULOCYTES NFR BLD: 0.5 %
INTERPRETATION ECG - MUSE: NORMAL
KETONES UR STRIP-MCNC: NEGATIVE MG/DL
LEUKOCYTE ESTERASE UR QL STRIP: NEGATIVE
LIPASE SERPL-CCNC: 88 U/L (ref 73–393)
LYMPHOCYTES # BLD AUTO: 1.9 10E9/L (ref 0.8–5.3)
LYMPHOCYTES NFR BLD AUTO: 32.5 %
MCH RBC QN AUTO: 30.2 PG (ref 26.5–33)
MCHC RBC AUTO-ENTMCNC: 33.8 G/DL (ref 31.5–36.5)
MCV RBC AUTO: 89 FL (ref 78–100)
MONOCYTES # BLD AUTO: 0.4 10E9/L (ref 0–1.3)
MONOCYTES NFR BLD AUTO: 6.5 %
NEUTROPHILS # BLD AUTO: 3.3 10E9/L (ref 1.6–8.3)
NEUTROPHILS NFR BLD AUTO: 57.8 %
NITRATE UR QL: NEGATIVE
NRBC # BLD AUTO: 0 10*3/UL
NRBC BLD AUTO-RTO: 0 /100
PH UR STRIP: 5.5 PH (ref 5–7)
PLATELET # BLD AUTO: 306 10E9/L (ref 150–450)
POTASSIUM SERPL-SCNC: 3.9 MMOL/L (ref 3.4–5.3)
PROT SERPL-MCNC: 8.3 G/DL (ref 6.8–8.8)
RBC # BLD AUTO: 5.13 10E12/L (ref 4.4–5.9)
RBC #/AREA URNS AUTO: <1 /HPF (ref 0–2)
SODIUM SERPL-SCNC: 136 MMOL/L (ref 133–144)
SOURCE: NORMAL
SP GR UR STRIP: 1.02 (ref 1–1.03)
SQUAMOUS #/AREA URNS AUTO: <1 /HPF (ref 0–1)
TROPONIN I SERPL-MCNC: 0.03 UG/L (ref 0–0.04)
TROPONIN I SERPL-MCNC: 0.03 UG/L (ref 0–0.04)
UROBILINOGEN UR STRIP-MCNC: NORMAL MG/DL (ref 0–2)
WBC # BLD AUTO: 5.7 10E9/L (ref 4–11)
WBC #/AREA URNS AUTO: <1 /HPF (ref 0–5)

## 2020-12-09 PROCEDURE — 80053 COMPREHEN METABOLIC PANEL: CPT | Performed by: EMERGENCY MEDICINE

## 2020-12-09 PROCEDURE — 96374 THER/PROPH/DIAG INJ IV PUSH: CPT

## 2020-12-09 PROCEDURE — C9803 HOPD COVID-19 SPEC COLLECT: HCPCS

## 2020-12-09 PROCEDURE — 85025 COMPLETE CBC W/AUTO DIFF WBC: CPT | Performed by: EMERGENCY MEDICINE

## 2020-12-09 PROCEDURE — 83690 ASSAY OF LIPASE: CPT | Performed by: EMERGENCY MEDICINE

## 2020-12-09 PROCEDURE — 250N000013 HC RX MED GY IP 250 OP 250 PS 637: Performed by: EMERGENCY MEDICINE

## 2020-12-09 PROCEDURE — 71045 X-RAY EXAM CHEST 1 VIEW: CPT

## 2020-12-09 PROCEDURE — 84484 ASSAY OF TROPONIN QUANT: CPT | Performed by: EMERGENCY MEDICINE

## 2020-12-09 PROCEDURE — 250N000011 HC RX IP 250 OP 636: Performed by: EMERGENCY MEDICINE

## 2020-12-09 PROCEDURE — 99285 EMERGENCY DEPT VISIT HI MDM: CPT | Mod: 25

## 2020-12-09 PROCEDURE — 93005 ELECTROCARDIOGRAM TRACING: CPT

## 2020-12-09 PROCEDURE — 85379 FIBRIN DEGRADATION QUANT: CPT | Performed by: EMERGENCY MEDICINE

## 2020-12-09 PROCEDURE — U0003 INFECTIOUS AGENT DETECTION BY NUCLEIC ACID (DNA OR RNA); SEVERE ACUTE RESPIRATORY SYNDROME CORONAVIRUS 2 (SARS-COV-2) (CORONAVIRUS DISEASE [COVID-19]), AMPLIFIED PROBE TECHNIQUE, MAKING USE OF HIGH THROUGHPUT TECHNOLOGIES AS DESCRIBED BY CMS-2020-01-R: HCPCS | Performed by: EMERGENCY MEDICINE

## 2020-12-09 PROCEDURE — 74176 CT ABD & PELVIS W/O CONTRAST: CPT

## 2020-12-09 PROCEDURE — 81001 URINALYSIS AUTO W/SCOPE: CPT | Performed by: EMERGENCY MEDICINE

## 2020-12-09 RX ORDER — ACETAMINOPHEN 500 MG
1000 TABLET ORAL ONCE
Status: COMPLETED | OUTPATIENT
Start: 2020-12-09 | End: 2020-12-09

## 2020-12-09 RX ORDER — CYCLOBENZAPRINE HCL 10 MG
10 TABLET ORAL 3 TIMES DAILY PRN
Qty: 12 TABLET | Refills: 0 | Status: SHIPPED | OUTPATIENT
Start: 2020-12-09 | End: 2020-12-15

## 2020-12-09 RX ORDER — LIDOCAINE 4 G/G
1 PATCH TOPICAL ONCE
Status: DISCONTINUED | OUTPATIENT
Start: 2020-12-09 | End: 2020-12-09 | Stop reason: HOSPADM

## 2020-12-09 RX ORDER — KETOROLAC TROMETHAMINE 15 MG/ML
15 INJECTION, SOLUTION INTRAMUSCULAR; INTRAVENOUS ONCE
Status: COMPLETED | OUTPATIENT
Start: 2020-12-09 | End: 2020-12-09

## 2020-12-09 RX ORDER — LIDOCAINE 4 G/G
1 PATCH TOPICAL EVERY 12 HOURS PRN
Qty: 10 PATCH | Refills: 0 | Status: SHIPPED | OUTPATIENT
Start: 2020-12-09 | End: 2022-02-10

## 2020-12-09 RX ADMIN — KETOROLAC TROMETHAMINE 15 MG: 15 INJECTION, SOLUTION INTRAMUSCULAR; INTRAVENOUS at 11:21

## 2020-12-09 RX ADMIN — ACETAMINOPHEN 1000 MG: 500 TABLET, FILM COATED ORAL at 13:17

## 2020-12-09 RX ADMIN — LIDOCAINE 1 PATCH: 560 PATCH PERCUTANEOUS; TOPICAL; TRANSDERMAL at 13:17

## 2020-12-09 ASSESSMENT — ENCOUNTER SYMPTOMS
SHORTNESS OF BREATH: 1
VOMITING: 0
MYALGIAS: 1
ABDOMINAL PAIN: 1
FEVER: 0
DIFFICULTY URINATING: 0
NAUSEA: 0
DIARRHEA: 0
BACK PAIN: 1
DYSURIA: 0
COUGH: 0
SORE THROAT: 0

## 2020-12-09 NOTE — LETTER
December 9, 2020      To Whom It May Concern:      Paco Ponce was seen in our Emergency Department today, 12/09/20.  He will need to self isolate for the next 10 days at least.  He may return to work/school when improved.    Sincerely,        Kesha Howard RN

## 2020-12-09 NOTE — ED PROVIDER NOTES
History   Chief Complaint:  Back Pain    HPI  Paco Ponce is a 40 year old male with a history of hypertension and kidney stones who presents for evaluation of bilateral upper back pain. Four days ago, the patient reports the onset of generalized myalgias. REports pain all over the body. He has been taking ibuprofen at home for this pain but reports it has not provided significant relief. Now, while the pain is still quite diffuse, he reports it is the worst in his mid-thoracic back bilaterally, greater on the left side. Given this localization, he presented to the ED. Had some shortness of breath a few days ago but none now. No chest pain. He did note some bilateral low chest/epigastric pain earlier, but this has since resolved. He states this pain feels different from when he has had kidney stones in the past. He denies any nausea, vomiting, diarrhea, or urinary symptoms. He also denies fever, loss of taste or smell, or any known COVID-19 exposures. Of note, he was tested for COVID-19 the day his symptoms started and negative.    Allergies:  Amoxicillin  Tessalon     Medications:    Hydrochlorothiazide  Lisinopril   Effexor XR     Past Medical History:    Anxiety   Bowel obstruction   Diverticulitis  Hypertension   Kidney stone   Depression   Migraines   Obesity      Past Surgical History:    Appendectomy   Left knee arthroscopy, partial medial meniscectomy x2  ENT surgery   Hernia repair     Family History:    No past pertinent family history.     Social History:  Marital Status: .   Negative for tobacco use.  Positive for alcohol use. Comment: 1-2 drinks per week.   Negative for drug use.     Review of Systems   Constitutional: Negative for fever.   HENT: Negative for congestion and sore throat.    Respiratory: Positive for shortness of breath (on exertion). Negative for cough.    Cardiovascular: Negative for chest pain.   Gastrointestinal: Positive for abdominal pain (epigastric/low chest -  resolved). Negative for diarrhea, nausea and vomiting.   Genitourinary: Negative for difficulty urinating and dysuria.   Musculoskeletal: Positive for back pain (L > R) and myalgias.   All other systems reviewed and are negative.    Physical Exam     Patient Vitals for the past 24 hrs:   BP Temp Pulse SpO2 Weight   12/09/20 1400 (!) 140/98 -- 76 96 % --   12/09/20 1300 120/77 -- 70 98 % --   12/09/20 1230 126/79 -- 67 98 % --   12/09/20 1200 124/85 -- 72 97 % --   12/09/20 1130 124/84 -- 74 97 % --   12/09/20 1115 -- -- -- 96 % --   12/09/20 1100 130/80 -- 77 99 % --   12/09/20 1007 (!) 141/99 97.5  F (36.4  C) 85 99 % 117.9 kg (260 lb)        Physical Exam  General: The patient appears comfortable  Head:  The scalp, face, and head appear normal  Eyes:  The pupils are equal and round    Conjunctivae and sclerae are normal  ENT:    Moist mucous membranes  Neck:  Normal range of motion  CV:  Regular rate and underlying rhythm     Skin warm and well perfused  Resp:  Lungs are clear    Non-labored breathing    No rales    No wheezing   GI:  Abdomen is soft, there is no rigidity    No distension    No rebound tenderness     No abdominal tenderness  MS:  Back:    The cervical and thoracic spine is non-tender in the midline    The lumbar spine is non-tender in the midline    There is tenderness on bilateral mid back on musculature L>R  Skin:  No rash or acute skin lesions noted.  No shingles noted.  Neuro: Speech is normal and fluent    Awake and alert    Face symmetric    Gait stable    SILT on lower extremities    No weakness on lower extremities    Emergency Department Course     ECG:  Indication: shortness of breath  Time: 1044  Vent. Rate 78 bpm. WI interval 152. QRS duration 96. QT/QTc 368/419. P-R-T axis 59 -12 40.    Normal sinus rhythm   Incomplete RBBB.  Borderline ECG.  No prior ECG for comparison. Read time: 1044.    Imaging:  Radiologic findings were communicated with the patient who voiced understanding of  the findings.  XR Chest Port 1 view:   Negative exam, as per radiology.     CT Abdomen/Pelvis w/o IV contrast-stone protocol:   1. No evidence of urinary tract stone or hydronephrosis.   2. Scattered colonic diverticula without evidence of diverticulitis.   3. No other evidence of an acute inflammatory process in the abdomen   or pelvis.   4. Marked hepatic fatty infiltration--possible etiologies include   consumption of alcohol or excessive carbohydrate intake, especially   sugar/fructose. Metabolic syndrome commonly occurs in combination with   nonalcoholic fatty liver disease. Although often reversible,   nonalcoholic fatty liver disease can progress to steatohepatitis and   Cirrhosis, as per radiology.     Laboratory:  Laboratory findings were communicated with the patient who voiced understanding of the findings.  CBC: WBC: 5.7, HGB: 15.5, PLT: 306  CMP: Glucose 147 (H), o/w WNL (Creatinine: 1.07)    D dimer: 0.3  Troponin (Collected: 1030): 0.032  Troponin (Collected: 1320): 0.029  Lipase: 88    UA with Microscopic: All Negative or WNL     Symptomatic COVID-19 Virus PCR: Pending     Interventions:  1121 Toradol, 15 mg, IV injection  1317 Tylenol, 1000 mg, PO   Lidocaine 4% patch, 1 patch, Transdermal     Emergency Department Course:  Nursing notes and vitals reviewed.   1019: I performed an exam of the patient as documented above.      IV was inserted and blood was drawn for laboratory testing, results above. Medicine administered as documented above.   The patient provided a urine sample here in the emergency department. This was sent for laboratory testing, findings above.    The patient's nose was swabbed and this sample was sent for COVID testing.   A portable chest x-ray was obtained and the patient was sent for a CT abdomen/pelvis while in the emergency department, results above.      1306: I rechecked the patient and discussed the results of his workup thus far.     1435: I rechecked the patient and  discussed the plan for home management. He feels better    Findings and plan explained to the Patient. Patient discharged home with instructions regarding supportive care, medications, and reasons to return. The importance of close follow-up was reviewed. The patient was prescribed Flexeril and lidocaine.    I personally reviewed the laboratory and imaging results with the Patient and answered all related questions prior to discharge.    Impression & Plan      Covid-19  Paco Ponce was evaluated during a global COVID-19 pandemic, which necessitated consideration that the patient might be at risk for infection with the SARS-CoV-2 virus that causes COVID-19.   Applicable protocols for evaluation were followed during the patient's care.   COVID-19 was considered as part of the patient's evaluation. The plan for testing is:  a test was obtained during this visit.    Medical Decision Making:   Paco Ponce is a 40 year old male who presents with back pain. Patient with diffuse body pain. Having still pain diffusely but now more prominent on mid back bilaterally. Looks well. Mild tenderness on left flank. Labs within normal limits. D-dimer negative and doubt PE. Chest xray clear. CT abdomen negative for stones. Has fatty liver which was discussed with him. Improved symptoms in ED. Did swab for covid again given the diffuse body aches. Troponin within normal limits. Repeated and unchanged. History is low likelihood of cardiac etiology. Doubt this and don't think hospitalization is indicated. Doubt dissection, spinal fracture, epidural abscess, epidural hematoma, etc. Recommended continued symptomatic treatment at home. Reasons to return to ED discussed with the patient.    Diagnosis:     ICD-10-CM    1. Acute bilateral thoracic back pain  M54.6        Disposition:   Discharged to home.    Discharge Medications:  New Prescriptions    CYCLOBENZAPRINE (FLEXERIL) 10 MG TABLET    Take 1 tablet (10 mg) by mouth 3  times daily as needed for muscle spasms    LIDOCAINE (LIDOCARE) 4 % PATCH    Place 1 patch onto the skin every 12 hours as needed for moderate pain To prevent lidocaine toxicity, patient should be patch free for 12 hrs daily.     Scribe Disclosure:  FLORA, Bobbi Torres, am serving as a scribe on 12/9/2020 at 10:19 AM to personally document services performed by Becki Billy MD based on my observations and the provider's statements to me.       EMERGENCY DEPARTMENT     Becki Billy MD  12/09/20 [id number]

## 2020-12-09 NOTE — ED AVS SNAPSHOT
[facility]  [address]  [facility] [address]  Phone: [phone]  Fax: [phone]                                    Paco Ponce   MRN: [id number]    Department: [facility]   Date of Visit: 12/9/2020           After Visit Summary Signature Page    I have received my discharge instructions, and my questions have been answered. I have discussed any challenges I see with this plan with the nurse or doctor.    ..........................................................................................................................................  Patient/Patient Representative Signature      ..........................................................................................................................................  Patient Representative Print Name and Relationship to Patient    ..................................................               ................................................  Date                                   Time    ..........................................................................................................................................  Reviewed by Signature/Title    ...................................................              ..............................................  Date                                               Time          22EPIC Rev 08/18

## 2020-12-09 NOTE — DISCHARGE INSTRUCTIONS
Use ibuprofen and tylenol for pain as needed  Use flexeril for muscle relaxation as needed  Lidocaine patches as needed - keep current patch on for 12 hours then take off for 12 hours. Then can put new patch on  Watch for chest pain, fever, shortness of breath  Covid test will come back in 1-3 days. You will receive a call if positive  Follow up with primary care provider    Discharge Instructions  COVID-19    COVID-19 is the disease caused by a new coronavirus. The virus spreads from person-to-person primarily by droplets when an infected person coughs or sneezes and the droplet either lands on another person or that other person touches a surface with the droplet on it. There are tests available to diagnose COVID-19. There is no specific treatment or medicine for the disease.    You may have been diagnosed with COVID, may be being tested for COVID and have a pending test result, or may have been exposed to COVID.    Symptoms of COVID-19  Many people have no symptoms or mild symptoms.  Symptoms may usually appear 4 to 5 days (up to 14 days) after contact with a person with COVID-19. Some people will get severe symptoms and pneumonia. Usual symptoms are:     ? Fever  ? Cough  ? Trouble breathing    Less common symptoms are: Headache, body aches, sore throat, sneezing, diarrhea,loss of taste or smell.    Isolation and Quarantine    You were seen because you have symptoms, had an exposure, or had some other concern about possible COVID. The best way to stop the spread of the virus is to avoid contact with others.  Isolation refers to sick people staying away from people who are not sick. A person in quarantine is limiting activity because they were exposed and are waiting to see if they might become sick.    If you test positive for COVID, you should stay home (isolation) for at least 10 days after your symptoms began, and for 24 hours with no fever and improvement of symptoms--whichever is longer. (Your fever should  be gone for 24 hours without using fever-reducing medicine). If you have no symptoms, you should stay home (isolation) for 10 days from the day of the test.    For example, if you have a fever and cough for 6 days, you need to stay home 4 more days with no fever for a total of 10 days. Or, if you have a fever and cough for 10 days, you need to stay home one more day with no fever for a total of 11 days.    If you have a high-risk exposure to COVID (you spent 15 minutes or more within six feet of somebody who has COVID), you should stay home (quarantine) for 14 days. Even if you test negative for COVID, the CDC recommends a 14-day quarantine from the time of your last exposure to that individual.    If you have symptoms but a negative test, you should stay at home until you are symptom-free and without fever for 24 hours, using the same judgment you would for when it is safe to return to work/school from strep throat, influenza, or the common cold. If you worsen, you should consider being re-evaluated.    If you are being tested for COVID and your test is pending, you should stay home until you know your test result.    How should I protect myself and others?    Do not go to work or school. Have a friend or relative do your shopping. Do not use public transportation (bus, train) or ridesharing (Lyft, Uber).    Separate yourself from other people in your home.?As much as possible, you should stay in one room and away from other people in your home. Also, use a separate bathroom, if possible. Avoid handling pets or other animals while sick.     Wear a facemask if you need to be around other people and cover your mouth and nose with a tissue when you cough or sneeze.     Avoid sharing personal household items. You should not share dishes, drinking glasses, forks/knives/spoons, towels, or bedding with other people in your home. After using these items, they should be washed with soap and water. Clean parts of your home  that are touched often (doorknobs, faucets, countertops, etc.) daily.     Wash your hands often with soap and water for at least 20 seconds or use an alcohol-based hand  containing at least 60% alcohol.     Avoid touching your face.    Treat your symptoms. You can take Acetaminophen (Tylenol) to treat body aches and fever as needed for comfort. Ibuprofen (Advil or Motrin) can be used as well if you still have symptoms after taking Tylenol. Drink fluids. Rest.    Watch for worsening symptoms such as shortness of breath/difficulty breathing or very severe weakness.    Employers/workplaces are being asked by the Centers for Disease Control (CDC) to not request notes/documentation for you to return to work or prove that you were ill. You may choose to show your employer this paperwork. Also, repeat testing should not be required to return to work.    Return to the Emergency Department if:    If you are developing worsening breathing, shortness of breath, or feel worse you should seek medical attention.  If you are uncertain, contact your health care provider/clinic. If you need emergency medical attention, call 911 and tell them you have been ill.

## 2020-12-10 LAB
SARS-COV-2 RNA SPEC QL NAA+PROBE: NOT DETECTED
SPECIMEN SOURCE: NORMAL

## 2021-01-15 ENCOUNTER — HEALTH MAINTENANCE LETTER (OUTPATIENT)
Age: 41
End: 2021-01-15

## 2021-01-17 DIAGNOSIS — I10 HYPERTENSION GOAL BP (BLOOD PRESSURE) < 140/90: ICD-10-CM

## 2021-01-19 RX ORDER — LISINOPRIL 10 MG/1
TABLET ORAL
Qty: 90 TABLET | Refills: 2 | Status: SHIPPED | OUTPATIENT
Start: 2021-01-19 | End: 2021-10-28

## 2021-01-19 RX ORDER — HYDROCHLOROTHIAZIDE 25 MG/1
TABLET ORAL
Qty: 90 TABLET | Refills: 2 | Status: SHIPPED | OUTPATIENT
Start: 2021-01-19 | End: 2021-10-28

## 2021-01-19 NOTE — TELEPHONE ENCOUNTER
Routing refill request to provider for review/approval because:  Labs out of range:  BP    LOV: 3/17/20    Nate CORONEL RN   [facility]

## 2021-03-29 DIAGNOSIS — F33.9 RECURRENT MAJOR DEPRESSIVE DISORDER, REMISSION STATUS UNSPECIFIED (H): ICD-10-CM

## 2021-03-31 NOTE — TELEPHONE ENCOUNTER
Pt called we scheduled a PE for the 6th but he is completely out requesting enough to get him to his apt

## 2021-03-31 NOTE — TELEPHONE ENCOUNTER
Due for a med check for further refills     Please help schedule    Thank you     Rachell Callahan RN, BSN  [facility]

## 2021-04-01 RX ORDER — VENLAFAXINE HYDROCHLORIDE 225 MG/1
225 TABLET, EXTENDED RELEASE ORAL DAILY
Qty: 90 TABLET | Refills: 0 | Status: SHIPPED | OUTPATIENT
Start: 2021-04-01 | End: 2021-06-24

## 2021-04-01 NOTE — TELEPHONE ENCOUNTER
Next 5 appointments (look out 90 days)    Apr 06, 2021  5:10 PM  PHYSICAL with GEN Chris CNP  [facility] ([facility] ) [address] [place] [address] [phone]        Medication is being filled for 1 time refill only due to:  Patient needs to be seen because due for med check.      Sonia Palacio RN  [facility]

## 2021-06-21 DIAGNOSIS — F33.9 RECURRENT MAJOR DEPRESSIVE DISORDER, REMISSION STATUS UNSPECIFIED (H): ICD-10-CM

## 2021-06-24 ENCOUNTER — TELEPHONE (OUTPATIENT)
Dept: FAMILY MEDICINE | Facility: CLINIC | Age: 41
End: 2021-06-24

## 2021-06-24 RX ORDER — VENLAFAXINE HYDROCHLORIDE 225 MG/1
225 TABLET, EXTENDED RELEASE ORAL DAILY
Qty: 90 TABLET | Refills: 0 | Status: SHIPPED | OUTPATIENT
Start: 2021-06-24 | End: 2021-09-17

## 2021-06-24 NOTE — TELEPHONE ENCOUNTER
Patient is leaving out of town on 06/25/21, has an appointment on 06/28/21, can he get a small refill?

## 2021-06-24 NOTE — TELEPHONE ENCOUNTER
Failed protocol. Sent phq9 via [facility]  please route to  team if patient needs an appointment     Joi HINSONRN BSN  [facility]  [phone]

## 2021-06-28 ENCOUNTER — OFFICE VISIT (OUTPATIENT)
Dept: FAMILY MEDICINE | Facility: CLINIC | Age: 41
End: 2021-06-28
Payer: COMMERCIAL

## 2021-06-28 VITALS
DIASTOLIC BLOOD PRESSURE: 76 MMHG | WEIGHT: 262 LBS | SYSTOLIC BLOOD PRESSURE: 124 MMHG | HEART RATE: 86 BPM | OXYGEN SATURATION: 98 % | HEIGHT: 72 IN | TEMPERATURE: 97.9 F | BODY MASS INDEX: 35.49 KG/M2

## 2021-06-28 DIAGNOSIS — E66.812 CLASS 2 OBESITY WITHOUT SERIOUS COMORBIDITY WITH BODY MASS INDEX (BMI) OF 35.0 TO 35.9 IN ADULT, UNSPECIFIED OBESITY TYPE: ICD-10-CM

## 2021-06-28 DIAGNOSIS — Z20.822 EXPOSURE TO COVID-19 VIRUS: ICD-10-CM

## 2021-06-28 DIAGNOSIS — I10 HYPERTENSION GOAL BP (BLOOD PRESSURE) < 140/90: Primary | ICD-10-CM

## 2021-06-28 DIAGNOSIS — E66.01 MORBID OBESITY (H): ICD-10-CM

## 2021-06-28 DIAGNOSIS — F33.9 RECURRENT MAJOR DEPRESSIVE DISORDER, REMISSION STATUS UNSPECIFIED (H): ICD-10-CM

## 2021-06-28 PROCEDURE — 99214 OFFICE O/P EST MOD 30 MIN: CPT | Performed by: FAMILY MEDICINE

## 2021-06-28 PROCEDURE — 36415 COLL VENOUS BLD VENIPUNCTURE: CPT | Performed by: FAMILY MEDICINE

## 2021-06-28 PROCEDURE — 86769 SARS-COV-2 COVID-19 ANTIBODY: CPT | Performed by: FAMILY MEDICINE

## 2021-06-28 RX ORDER — SILDENAFIL CITRATE 20 MG/1
TABLET ORAL
Qty: 30 TABLET | Refills: 0 | Status: SHIPPED | OUTPATIENT
Start: 2021-06-28 | End: 2022-02-10

## 2021-06-28 ASSESSMENT — MIFFLIN-ST. JEOR: SCORE: 2121.42

## 2021-06-28 NOTE — PROGRESS NOTES
Assessment & Plan     Recurrent major depressive disorder, remission status unspecified (H): well controlled. Will continue Effexor 225 mg daily. Okay to use sildenafil for ED as needed.  - sildenafil (REVATIO) 20 MG tablet; Take 2-5 tablets ( mg) by mouth daily 30-60 minutes before anticipated need for erectile dysfunction    Class 2 obesity without serious comorbidity with body mass index (BMI) of 35.0 to 35.9 in adult, unspecified obesity type    Morbid obesity (H)    Hypertension goal BP (blood pressure) < 140/90:L well controlled. Continue hydrochlorothiazide and lisinopril.    Exposure to COVID-19 virus: over the past year, wife tested positive for COVID and he had close exposure but never developed symptoms or tested positive.  - COVID-19 Delvis RBD Nay & Titer Reflex; Future       BMI:   Estimated body mass index is 35.53 kg/m  as calculated from the following:    Height as of this encounter: 1.829 m (6').    Weight as of this encounter: 118.8 kg (262 lb).         Return in about 6 months (around 12/28/2021) for follow up.    Douglas Longo DO  [facility] Antonio is a 41 year old who presents for the following health issues     History of Present Illness       He eats 0-1 servings of fruits and vegetables daily.He consumes 2 sweetened beverage(s) daily.He exercises with enough effort to increase his heart rate 10 to 19 minutes per day.  He exercises with enough effort to increase his heart rate 3 or less days per week. He is missing 7 dose(s) of medications per week.  He is not taking prescribed medications regularly due to other.       Hypertension Follow-up      Do you check your blood pressure regularly outside of the clinic? No     Are you following a low salt diet? No    Are your blood pressures ever more than 140 on the top number (systolic) OR more   than 90 on the bottom number (diastolic), for example 140/90? No    Denies any headaches,  lightheadedness, dizziness, vision changes, chest pain, palpitations, shortness of breath, dyspnea, numbness/tingling.      Depression and Anxiety Follow-Up    How are you doing with your depression since your last visit? Improved     How are you doing with your anxiety since your last visit?  Improved     Are you having other symptoms that might be associated with depression or anxiety? No    Have you had a significant life event? No     Do you have any concerns with your use of alcohol or other drugs? No    Medication working very well. Mood is well controlled. No significant side effects but has noticed impact on libido and causing ED.     Social History     Tobacco Use     Smoking status: Never Smoker     Smokeless tobacco: Never Used   Substance Use Topics     Alcohol use: Yes     Alcohol/week: 0.0 standard drinks     Comment: per wk 1-2     Drug use: No     PHQ 4/19/2019 2/17/2020 3/17/2020   PHQ-9 Total Score 2 6 1   Q9: Thoughts of better off dead/self-harm past 2 weeks Not at all Not at all Not at all   F/U: Thoughts of suicide or self-harm - - -   F/U: Safety concerns - - -     MAAME-7 SCORE 7/10/2018 4/19/2019 3/17/2020   Total Score 2 3 1     Last PHQ-9 3/17/2020   1.  Little interest or pleasure in doing things 0   2.  Feeling down, depressed, or hopeless 0   3.  Trouble falling or staying asleep, or sleeping too much 0   4.  Feeling tired or having little energy 1   5.  Poor appetite or overeating 0   6.  Feeling bad about yourself 0   7.  Trouble concentrating 0   8.  Moving slowly or restless 0   Q9: Thoughts of better off dead/self-harm past 2 weeks 0   PHQ-9 Total Score 1   Difficulty at work, home, or with people Not difficult at all   In the past two weeks have you had thoughts of suicide or self harm? -   Do you have concerns about your personal safety or the safety of others? -     MAAME-7  3/17/2020   1. Feeling nervous, anxious, or on edge 0   2. Not being able to stop or control worrying 0   3.  Worrying too much about different things 0   4. Trouble relaxing 0   5. Being so restless that it is hard to sit still 0   6. Becoming easily annoyed or irritable 1   7. Feeling afraid, as if something awful might happen 0   MAAME-7 Total Score 1   If you checked any problems, how difficult have they made it for you to do your work, take care of things at home, or get along with other people? Not difficult at all         Review of Systems   Constitutional, HEENT, cardiovascular, pulmonary, gi and gu systems are negative, except as otherwise noted.      Objective    /76   Pulse 86   Temp 97.9  F (36.6  C) (Tympanic)   Ht 1.829 m (6')   Wt 118.8 kg (262 lb)   SpO2 98%   BMI 35.53 kg/m    Body mass index is 35.53 kg/m .  Physical Exam   GENERAL: healthy, alert and no distress  RESP: lungs clear to auscultation - no rales, rhonchi or wheezes  CV: regular rate and rhythm, normal S1 S2, no S3 or S4, no murmur, click or rub, no peripheral edema and peripheral pulses strong  MS: no gross musculoskeletal defects noted, no edema

## 2021-06-30 LAB
SARS-COV-2 AB PNL SERPL IA: NEGATIVE
SARS-COV-2 IGG SERPL IA-ACNC: NORMAL

## 2021-09-04 ENCOUNTER — HEALTH MAINTENANCE LETTER (OUTPATIENT)
Age: 41
End: 2021-09-04

## 2021-09-15 DIAGNOSIS — F33.9 RECURRENT MAJOR DEPRESSIVE DISORDER, REMISSION STATUS UNSPECIFIED (H): ICD-10-CM

## 2021-09-16 NOTE — TELEPHONE ENCOUNTER
LOV: 6/28/2021     PHQ-9 score:    PHQ 3/17/2020   PHQ-9 Total Score 1   Q9: Thoughts of better off dead/self-harm past 2 weeks Not at all   F/U: Thoughts of suicide or self-harm -   F/U: Safety concerns -       MyChart sent for updated PHQ/MAAME    Celia Reveles RN  [facility]

## 2021-09-17 RX ORDER — VENLAFAXINE HYDROCHLORIDE 225 MG/1
225 TABLET, EXTENDED RELEASE ORAL DAILY
Qty: 90 TABLET | Refills: 0 | Status: SHIPPED | OUTPATIENT
Start: 2021-09-17 | End: 2021-12-13

## 2021-09-25 NOTE — LETTER
[facility]  [address] [address]  [facility] [place] [facility] [address]  [phone]          February 21, 2017    RE:  Paco Ponce                                                                                                                                                       [address] [address] [facility] [address]            To whom it may concern:    Paco Ponce is under my professional care for Left knee surgery.  He  may return to work with the following: The employee is UNABLE to return to work until 3/15/2017.    When the patient returns to work, there may be restrictions depending on progress.    Sincerely,        Emiliano Gallardo PA-C  [facility]       Normal

## 2021-12-11 DIAGNOSIS — F33.9 RECURRENT MAJOR DEPRESSIVE DISORDER, REMISSION STATUS UNSPECIFIED (H): ICD-10-CM

## 2021-12-13 RX ORDER — VENLAFAXINE HYDROCHLORIDE 225 MG/1
225 TABLET, EXTENDED RELEASE ORAL DAILY
Qty: 30 TABLET | Refills: 0 | Status: SHIPPED | OUTPATIENT
Start: 2021-12-13 | End: 2022-01-10

## 2021-12-13 NOTE — TELEPHONE ENCOUNTER
Due for an Office visit for further refills. Maty refill of 30 days     Mi CLAY RN   [facility] Triage

## 2022-01-06 DIAGNOSIS — F33.9 RECURRENT MAJOR DEPRESSIVE DISORDER, REMISSION STATUS UNSPECIFIED (H): ICD-10-CM

## 2022-01-07 NOTE — TELEPHONE ENCOUNTER
LOV: 6/28/2021   Patient due for Med check  No future appt scheduled    Routing to [facility] to assist in scheduling    Celia Reveles RN, BSN  [facility]

## 2022-01-09 ENCOUNTER — NURSE TRIAGE (OUTPATIENT)
Dept: NURSING | Facility: CLINIC | Age: 42
End: 2022-01-09
Payer: COMMERCIAL

## 2022-01-09 NOTE — TELEPHONE ENCOUNTER
COVID 19 Nurse Triage Plan/Patient Instructions    Please be aware that novel coronavirus (COVID-19) may be circulating in the community. If you develop symptoms such as fever, cough, or SOB or if you have concerns about the presence of another infection including coronavirus (COVID-19), please contact your health care provider or visit [email or web address].     Disposition/Instructions    Home care recommended. Follow home care protocol based instructions.    Thank you for taking steps to prevent the spread of this virus.  o Limit your contact with others.  o Wear a simple mask to cover your cough.  o Wash your hands well and often.    Resources    [facility]: About COVID-19: [email or web address]    CDC: What to Do If You're Sick: www.cdc.gov/coronavirus/2019-ncov/about/steps-when-sick.html    CDC: Ending Home Isolation: www.cdc.gov/coronavirus/2019-ncov/hcp/disposition-in-home-patients.html     CDC: Caring for Someone: www.cdc.gov/coronavirus/2019-ncov/if-you-are-sick/care-for-someone.html     [facility]: Interim Guidance for Hospital Discharge to Home: www.health.[facility].[place]/diseases/coronavirus/hcp/hospdischarge.pdf    [facility] clinical trials (COVID-19 research studies): clinicalaffairs.[facility]-clinical-trials     Below are the COVID-19 hotlines at the [place] Department of Health ([facility]). Interpreters are available.   o For health questions: Call 973-664-6628 or 1-950.733.3510 (7 a.m. to 7 p.m.)  o For questions about schools and childcare: Call 148-286-5756 or 1-690.647.7115 (7 a.m. to 7 p.m.)                   Reason for Disposition    [1] Typical COVID-19 symptoms AND [2] lasting less than 3 weeks    [1] COVID-19 diagnosed by positive lab test AND [2] mild symptoms (e.g., cough, fever, others) AND [3] no complications or SOB    Additional Information    Negative: SEVERE difficulty breathing (e.g., struggling for each breath, speaks in single words)    Negative: [1]  SEVERE weakness (e.g., can't stand or can barely walk) AND [2] new-onset or WORSE    Negative: Difficult to awaken or acting confused (e.g., disoriented, slurred speech)    Negative: Bluish (or gray) lips or face now    Negative: Sounds like a life-threatening emergency to the triager    Negative: SEVERE difficulty breathing (e.g., struggling for each breath, speaks in single words)    Negative: Difficult to awaken or acting confused (e.g., disoriented, slurred speech)    Negative: Bluish (or gray) lips or face now    Negative: Shock suspected (e.g., cold/pale/clammy skin, too weak to stand, low BP, rapid pulse)    Negative: Sounds like a life-threatening emergency to the triager    Negative: [1] COVID-19 exposure AND [2] no symptoms    Negative: COVID-19 vaccine reaction suspected (e.g., fever, headache, muscle aches) occurring 1 to 3 days after getting vaccine    Negative: COVID-19 vaccine, questions about    Negative: [1] Lives with someone known to have influenza (flu test positive) AND [2] flu-like symptoms (e.g., cough, runny nose, sore throat, SOB; with or without fever)    Negative: [1] Adult with possible COVID-19 symptoms AND [2] triager concerned about severity of symptoms or other causes    Negative: COVID-19 and breastfeeding, questions about    Negative: SEVERE or constant chest pain or pressure (Exception: mild central chest pain, present only when coughing)    Negative: MODERATE difficulty breathing (e.g., speaks in phrases, SOB even at rest, pulse 100-120)    Negative: [1] Headache AND [2] stiff neck (can't touch chin to chest)    Negative: MILD difficulty breathing (e.g., minimal/no SOB at rest, SOB with walking, pulse <100)    Negative: Chest pain or pressure    Negative: Patient sounds very sick or weak to the triager    Negative: Fever > 103 F (39.4 C)    Negative: [1] Fever > 101 F (38.3 C) AND [2] age > 60 years    Negative: [1] Fever > 100.0 F (37.8 C) AND [2] bedridden (e.g., nursing home  patient, CVA, chronic illness, recovering from surgery)    Negative: HIGH RISK for severe COVID complications (e.g., age > 64 years, obesity with BMI > 25, pregnant, chronic lung disease or other chronic medical condition)  (Exception: Already seen by PCP and no new or worsening symptoms.)    Negative: [1] HIGH RISK patient AND [2] influenza is widespread in the community AND [3] ONE OR MORE respiratory symptoms: cough, sore throat, runny or stuffy nose    Negative: [1] HIGH RISK patient AND [2] influenza exposure within the last 7 days AND [3] ONE OR MORE respiratory symptoms: cough, sore throat, runny or stuffy nose    Negative: [1] COVID-19 infection suspected by caller or triager AND [2] mild symptoms (cough, fever, or others) AND [3] negative COVID-19 rapid test    Negative: Fever present > 3 days (72 hours)    Negative: [1] Fever returns after gone for over 24 hours AND [2] symptoms worse or not improved    Negative: [1] Continuous (nonstop) coughing interferes with work or school AND [2] no improvement using cough treatment per protocol    Negative: Cough present > 3 weeks    Negative: [1] COVID-19 diagnosed by positive lab test AND [2] NO symptoms (e.g., cough, fever, others)    [facility] [place] Specific Disposition - [facility] [place] [facility] Patient Instructions  COVID 19 Nurse Triage Plan/Patient Instructions    Please be aware that novel coronavirus (COVID-19) may be circulating in the community. If you develop symptoms such as fever, cough, or SOB or if you have concerns about the presence of another infection including coronavirus (COVID-19), please contact your health care provider or visit [email or web address]      Home care recommended. Follow home care protocol based instructions.    Thank you for taking steps to prevent the spread of this virus.  Limit your contact with others.  Wear a simple mask to cover your cough.  Wash your hands well and often.    Resources  [facility] [place]:  "About COVID-19: [email or web address]  CDC: What to Do If You're Sick: www.cdc.gov/coronavirus/2019-ncov/about/steps-when-sick.html  CDC: Ending Home Isolation: www.cdc.gov/coronavirus/2019-ncov/hcp/disposition-in-home-patients.html   CDC: Caring for Someone: www.cdc.gov/coronavirus/2019-ncov/if-you-are-sick/care-for-someone.html   [facility]: Interim Guidance for Hospital Discharge to Home: www.[facility].[place]./diseases/coronavirus/hcp/hospdischarge.pdf  [facility] clinical trials (COVID-19 research studies): clinicalaffairs.[facility]-clinical-trials   Below are the COVID-19 hotlines at the [place] Department of Health ([facility]). Interpreters are available.   For health questions: Call 894-672-6127 or 1-220.841.1273 (7 a.m. to 7 p.m.)  For questions about schools and childcare: Call 762-273-9551 or 1-527.914.5447 (7 a.m. to 7 p.m.)    Answer Assessment - Initial Assessment Questions  1. COVID-19 ONSET: \"When did the symptoms of COVID-19 first start?\"      More than a week ago  2. DIAGNOSIS CONFIRMATION: \"How were you diagnosed?\" (e.g., COVID-19 oral or nasal viral test; COVID-19 antibody test; doctor visit)      covid test  3. MAIN SYMPTOM:  \"What is your main concern or symptom right now?\" (e.g., breathing difficulty, cough, fatigue. loss of smell)      Fatigue, cough, sometimes short of breath  4. SYMPTOM ONSET: \"When did the  symtoms  start?\"      More than a week  5. BETTER-SAME-WORSE: \"Are you getting better, staying the same, or getting worse over the last 1 to 2 weeks?\"      same  6. RECENT MEDICAL VISIT: \"Have you been seen by a healthcare provider (doctor, NP, PA) for these persisting COVID-19 symptoms?\" If Yes, ask: \"When were you seen?\" (e.g., date)      no  7. COUGH: \"Do you have a cough?\" If Yes, ask: \"How bad is the cough?\"        yes  8. FEVER: \"Do you have a fever?\" If Yes, ask: \"What is your temperature, how was it measured, and when did it start?\"      no  9. BREATHING " "DIFFICULTY: \"Are you having any trouble breathing?\" If Yes, ask: \"How bad is your breathing?\" (e.g., mild, moderate, severe)     - MILD: No SOB at rest, mild SOB with walking, speaks normally in sentences, can lay down, no retractions, pulse < 100.     - MODERATE: SOB at rest, SOB with minimal exertion and prefers to sit, cannot lie down flat, speaks in phrases, mild retractions, audible wheezing, pulse 100-120.     - SEVERE: Very SOB at rest, speaks in single words, struggling to breathe, sitting hunched forward, retractions, pulse > 120        Speaks in full sentences with no obvious difficulty  10. HIGH RISK DISEASE: \"Do you have any chronic medical problems?\" (e.g., asthma, heart or lung disease, weak immune system, obesity, etc.)        no  11. PREGNANCY: \"Is there any chance you are pregnant?\" \"When was your last menstrual period?\"        no  12. OTHER SYMPTOMS: \"Do you have any other symptoms?\"  (e.g., fatigue, headache, muscle pain, weakness)        Weak and fatigues, tired of being sick    Answer Assessment - Initial Assessment Questions  1. COVID-19 DIAGNOSIS: \"Who made your Coronavirus (COVID-19) diagnosis?\" \"Was it confirmed by a positive lab test?\" If not diagnosed by a HCP, ask \"Are there lots of cases (community spread) where you live?\" (See public health department website, if unsure)      yes  2. COVID-19 EXPOSURE: \"Was there any known exposure to COVID before the symptoms began?\" CDC Definition of close contact: within 6 feet (2 meters) for a total of 15 minutes or more over a 24-hour period.       ?  3. ONSET: \"When did the COVID-19 symptoms start?\"       More than a week  4. WORST SYMPTOM: \"What is your worst symptom?\" (e.g., cough, fever, shortness of breath, muscle aches)      Fatigue, weak legs  5. COUGH: \"Do you have a cough?\" If Yes, ask: \"How bad is the cough?\"        yes  6. FEVER: \"Do you have a fever?\" If Yes, ask: \"What is your temperature, how was it measured, and when did it start?\"   " "   no  7. RESPIRATORY STATUS: \"Describe your breathing?\" (e.g., shortness of breath, wheezing, unable to speak)       Short of breath sometimes, currently speaks in full and complete sentences without obvious difficulty  8. BETTER-SAME-WORSE: \"Are you getting better, staying the same or getting worse compared to yesterday?\"  If getting worse, ask, \"In what way?\"      same  9. HIGH RISK DISEASE: \"Do you have any chronic medical problems?\" (e.g., asthma, heart or lung disease, weak immune system, obesity, etc.)      no  10. PREGNANCY: \"Is there any chance you are pregnant?\" \"When was your last menstrual period?\"        no  11. OTHER SYMPTOMS: \"Do you have any other symptoms?\"  (e.g., chills, fatigue, headache, loss of smell or taste, muscle pain, sore throat; new loss of smell or taste especially support the diagnosis of COVID-19)        Fatigue, legs weak    Protocols used: CORONAVIRUS (COVID-19) PERSISTING SYMPTOMS FOLLOW-UP CALL-[facility] 8.25.2021, CORONAVIRUS (COVID-19) DIAGNOSED OR [id number] 8.25.2021      "

## 2022-01-10 RX ORDER — VENLAFAXINE HYDROCHLORIDE 225 MG/1
225 TABLET, EXTENDED RELEASE ORAL DAILY
Qty: 30 TABLET | Refills: 0 | Status: SHIPPED | OUTPATIENT
Start: 2022-01-10 | End: 2022-02-06

## 2022-01-10 NOTE — TELEPHONE ENCOUNTER
Medication is being filled for 1 time refill only due to:  upcoming appt      Next 5 appointments (look out 90 days)    Jan 27, 2022  7:10 AM  (Arrive by 6:50 AM)  Provider Visit with Douglas Longo DO  [facility] ([facility] ) [address] [place] [id number] [phone]

## 2022-02-05 DIAGNOSIS — F33.9 RECURRENT MAJOR DEPRESSIVE DISORDER, REMISSION STATUS UNSPECIFIED (H): ICD-10-CM

## 2022-02-06 RX ORDER — VENLAFAXINE HYDROCHLORIDE 225 MG/1
TABLET, EXTENDED RELEASE ORAL
Qty: 30 TABLET | Refills: 0 | Status: SHIPPED | OUTPATIENT
Start: 2022-02-06 | End: 2022-02-08

## 2022-02-06 NOTE — TELEPHONE ENCOUNTER
phq9 sent via [employer]    Failed protocol.  please route to  team if patient needs an appointment     Joi HINSONRN BSN  [facility]  [phone]

## 2022-02-07 DIAGNOSIS — F33.9 RECURRENT MAJOR DEPRESSIVE DISORDER, REMISSION STATUS UNSPECIFIED (H): ICD-10-CM

## 2022-02-08 RX ORDER — VENLAFAXINE HYDROCHLORIDE 225 MG/1
TABLET, EXTENDED RELEASE ORAL
Qty: 90 TABLET | Refills: 0 | Status: SHIPPED | OUTPATIENT
Start: 2022-02-08 | End: 2022-02-10

## 2022-02-08 NOTE — TELEPHONE ENCOUNTER
Routing refill request to provider for review/approval because:     Serotonin-Norepinephrine Reuptake Inhibitors  Failed 02/07/2022 04:37 PM   Protocol Details  PHQ-9 score of less than 5 in past 6 months    Normal serum creatinine on file in past 12 litzy Callahan RN, BSN  [facility]

## 2022-02-10 ENCOUNTER — VIRTUAL VISIT (OUTPATIENT)
Dept: FAMILY MEDICINE | Facility: CLINIC | Age: 42
End: 2022-02-10
Payer: COMMERCIAL

## 2022-02-10 DIAGNOSIS — M25.50 MULTIPLE JOINT PAIN: ICD-10-CM

## 2022-02-10 DIAGNOSIS — I10 HYPERTENSION GOAL BP (BLOOD PRESSURE) < 140/90: ICD-10-CM

## 2022-02-10 DIAGNOSIS — R52 BODY ACHES: ICD-10-CM

## 2022-02-10 DIAGNOSIS — F33.9 RECURRENT MAJOR DEPRESSIVE DISORDER, REMISSION STATUS UNSPECIFIED (H): Primary | ICD-10-CM

## 2022-02-10 PROCEDURE — 99214 OFFICE O/P EST MOD 30 MIN: CPT | Mod: 95 | Performed by: FAMILY MEDICINE

## 2022-02-10 PROCEDURE — 96127 BRIEF EMOTIONAL/BEHAV ASSMT: CPT | Mod: 95 | Performed by: FAMILY MEDICINE

## 2022-02-10 RX ORDER — VENLAFAXINE HYDROCHLORIDE 225 MG/1
225 TABLET, EXTENDED RELEASE ORAL DAILY
Qty: 90 TABLET | Refills: 1 | Status: SHIPPED | OUTPATIENT
Start: 2022-02-10 | End: 2022-03-31

## 2022-02-10 RX ORDER — SILDENAFIL CITRATE 20 MG/1
TABLET ORAL
Qty: 30 TABLET | Refills: 0 | Status: SHIPPED | OUTPATIENT
Start: 2022-02-10 | End: 2023-07-27

## 2022-02-10 RX ORDER — HYDROCHLOROTHIAZIDE 25 MG/1
25 TABLET ORAL DAILY
Qty: 90 TABLET | Refills: 1 | Status: SHIPPED | OUTPATIENT
Start: 2022-02-10 | End: 2022-11-09

## 2022-02-10 RX ORDER — LISINOPRIL 10 MG/1
10 TABLET ORAL DAILY
Qty: 90 TABLET | Refills: 1 | Status: SHIPPED | OUTPATIENT
Start: 2022-02-10 | End: 2022-11-09

## 2022-02-10 ASSESSMENT — ANXIETY QUESTIONNAIRES
2. NOT BEING ABLE TO STOP OR CONTROL WORRYING: SEVERAL DAYS
7. FEELING AFRAID AS IF SOMETHING AWFUL MIGHT HAPPEN: NOT AT ALL
1. FEELING NERVOUS, ANXIOUS, OR ON EDGE: NOT AT ALL
6. BECOMING EASILY ANNOYED OR IRRITABLE: MORE THAN HALF THE DAYS
3. WORRYING TOO MUCH ABOUT DIFFERENT THINGS: SEVERAL DAYS
5. BEING SO RESTLESS THAT IT IS HARD TO SIT STILL: NOT AT ALL
IF YOU CHECKED OFF ANY PROBLEMS ON THIS QUESTIONNAIRE, HOW DIFFICULT HAVE THESE PROBLEMS MADE IT FOR YOU TO DO YOUR WORK, TAKE CARE OF THINGS AT HOME, OR GET ALONG WITH OTHER PEOPLE: NOT DIFFICULT AT ALL
GAD7 TOTAL SCORE: 4

## 2022-02-10 ASSESSMENT — PATIENT HEALTH QUESTIONNAIRE - PHQ9
5. POOR APPETITE OR OVEREATING: NOT AT ALL
SUM OF ALL RESPONSES TO PHQ QUESTIONS 1-9: 2

## 2022-02-10 NOTE — PROGRESS NOTES
Paco is a 41 year old who is being evaluated via a billable telephone visit.      What phone number would you like to be contacted at? [phone]  How would you like to obtain your AVS? Manolo    Assessment & Plan   1. Recurrent major depressive disorder, remission status unspecified (H): Mood is well controlled medication.  Does have side effect of ED/decreased libido.  Using sildenafil as needed.  - sildenafil (REVATIO) 20 MG tablet; Take 2-5 tablets ( mg) by mouth daily 30-60 minutes before anticipated need for erectile dysfunction  Dispense: 30 tablet; Refill: 0  - venlafaxine (EFFEXOR-ER) 225 MG 24 hr tablet; Take 1 tablet (225 mg) by mouth daily  Dispense: 90 tablet; Refill: 1    2. Hypertension goal BP (blood pressure) < 140/90: has been well controlled.  Will continue hydrochlorothiazide and lisinopril.  Follow-up in 6 months.  - hydrochlorothiazide (HYDRODIURIL) 25 MG tablet; Take 1 tablet (25 mg) by mouth daily  Dispense: 90 tablet; Refill: 1  - lisinopril (ZESTRIL) 10 MG tablet; Take 1 tablet (10 mg) by mouth daily  Dispense: 90 tablet; Refill: 1    3. Body aches  - Adult Rheumatology  Referral; Future    4. Multiple joint pain: Given ongoing generalized symptoms without improvement cause, will place referral to rheumatology for consultation for further evaluation and recommendations.  We also discussed possibly changing venlafaxine to duloxetine to see if this would give him better relief of pain.  However would defer making that change until other possible causes are ruled out as a mood is well controlled on venlafaxine.  - Adult Rheumatology  Referral; Future       BMI:   Estimated body mass index is 35.53 kg/m  as calculated from the following:    Height as of 6/28/21: 1.829 m (6').    Weight as of 6/28/21: 118.8 kg (262 lb).         Return in about 6 months (around 8/10/2022) for BP Recheck, follow up on mood.    Douglas Longo DO  [facility] PRIOR  ABE Antonio is a 41 year old who presents for the following health issues  accompanied by his self.    HPI     Hypertension Follow-up      Do you check your blood pressure regularly outside of the clinic? No     Are you following a low salt diet? No    Are your blood pressures ever more than 140 on the top number (systolic) OR more   than 90 on the bottom number (diastolic), for example 140/90? n/a    BP Readings from Last 2 Encounters:   06/28/21 124/76   12/09/20 (!) 133/95       Depression and Anxiety Follow-Up    How are you doing with your depression since your last visit? No change    How are you doing with your anxiety since your last visit?  No change    Are you having other symptoms that might be associated with depression or anxiety? No    Have you had a significant life event? No     Do you have any concerns with your use of alcohol or other drugs? No       She also notes ongoing diffuse body aches.  He does see a chiropractor with some temporary relief.  He consistently takes 1 to 2000 mg of ibuprofen per day to help with symptoms.  This has been going on for years.  He has previously had evaluation completed with negative inflammatory markers, and negative MIGDALIA, negative rheumatoid factor.  His vitamin D was low and this was replaced.  Consideration was given to the possibility that it be contributing to body pain.  Despite mood being well controlled with venlafaxine, it does not seem that has made a difference in pain.    Social History     Tobacco Use     Smoking status: Never Smoker     Smokeless tobacco: Never Used   Substance Use Topics     Alcohol use: Yes     Alcohol/week: 0.0 standard drinks     Comment: per wk 1-2     Drug use: No     PHQ 2/17/2020 3/17/2020 2/10/2022   PHQ-9 Total Score 6 1 2   Q9: Thoughts of better off dead/self-harm past 2 weeks Not at all Not at all Not at all   F/U: Thoughts of suicide or self-harm - - -   F/U: Safety concerns - - -     MAAME-7 SCORE 4/19/2019  3/17/2020 2/10/2022   Total Score 3 1 4     Last PHQ-9 2/10/2022   1.  Little interest or pleasure in doing things 1   2.  Feeling down, depressed, or hopeless 0   3.  Trouble falling or staying asleep, or sleeping too much 0   4.  Feeling tired or having little energy 1   5.  Poor appetite or overeating 0   6.  Feeling bad about yourself 0   7.  Trouble concentrating 0   8.  Moving slowly or restless 0   Q9: Thoughts of better off dead/self-harm past 2 weeks 0   PHQ-9 Total Score 2   Difficulty at work, home, or with people Not difficult at all   In the past two weeks have you had thoughts of suicide or self harm? -   Do you have concerns about your personal safety or the safety of others? -     MAAME-7  2/10/2022   1. Feeling nervous, anxious, or on edge 0   2. Not being able to stop or control worrying 1   3. Worrying too much about different things 1   4. Trouble relaxing 0   5. Being so restless that it is hard to sit still 0   6. Becoming easily annoyed or irritable 2   7. Feeling afraid, as if something awful might happen 0   MAAME-7 Total Score 4   If you checked any problems, how difficult have they made it for you to do your work, take care of things at home, or get along with other people? Not difficult at all       Suicide Assessment Five-step Evaluation and Treatment (SAFE-T)      How many servings of fruits and vegetables do you eat daily?  2-3    On average, how many sweetened beverages do you drink each day (Examples: soda, juice, sweet tea, etc.  Do NOT count diet or artificially sweetened beverages)?   2    How many days per week do you exercise enough to make your heart beat faster? 3 or less    How many minutes a day do you exercise enough to make your heart beat faster? 30 - 60    How many days per week do you miss taking your medication? 0      Review of Systems   Constitutional, HEENT, cardiovascular, pulmonary, gi and gu systems are negative, except as otherwise noted.      Objective            Vitals:  No vitals were obtained today due to virtual visit.    Physical Exam   healthy, alert and no distress  PSYCH: Alert and oriented times 3; coherent speech, normal   rate and volume, able to articulate logical thoughts, able   to abstract reason, no tangential thoughts, no hallucinations   or delusions  His affect is normal  RESP: No cough, no audible wheezing, able to talk in full sentences  Remainder of exam unable to be completed due to telephone visits          Phone call duration: 9 minutes

## 2022-02-11 ASSESSMENT — ANXIETY QUESTIONNAIRES: GAD7 TOTAL SCORE: 4

## 2022-02-19 ENCOUNTER — HEALTH MAINTENANCE LETTER (OUTPATIENT)
Age: 42
End: 2022-02-19

## 2022-03-30 ENCOUNTER — OFFICE VISIT (OUTPATIENT)
Dept: RHEUMATOLOGY | Facility: CLINIC | Age: 42
End: 2022-03-30
Attending: FAMILY MEDICINE
Payer: COMMERCIAL

## 2022-03-30 ENCOUNTER — LAB (OUTPATIENT)
Dept: LAB | Facility: CLINIC | Age: 42
End: 2022-03-30

## 2022-03-30 ENCOUNTER — TELEPHONE (OUTPATIENT)
Dept: FAMILY MEDICINE | Facility: CLINIC | Age: 42
End: 2022-03-30

## 2022-03-30 VITALS
WEIGHT: 265.6 LBS | BODY MASS INDEX: 36.02 KG/M2 | DIASTOLIC BLOOD PRESSURE: 80 MMHG | HEART RATE: 80 BPM | SYSTOLIC BLOOD PRESSURE: 120 MMHG

## 2022-03-30 DIAGNOSIS — M25.50 MULTIPLE JOINT PAIN: ICD-10-CM

## 2022-03-30 DIAGNOSIS — R52 BODY ACHES: ICD-10-CM

## 2022-03-30 DIAGNOSIS — F33.9 RECURRENT MAJOR DEPRESSIVE DISORDER, REMISSION STATUS UNSPECIFIED (H): Primary | ICD-10-CM

## 2022-03-30 LAB
C REACTIVE PROTEIN LHE: 0.3 MG/DL (ref 0–0.8)
ERYTHROCYTE [SEDIMENTATION RATE] IN BLOOD BY WESTERGREN METHOD: 7 MM/HR (ref 0–15)
URATE SERPL-MCNC: 7.6 MG/DL (ref 3–8)

## 2022-03-30 PROCEDURE — 86140 C-REACTIVE PROTEIN: CPT

## 2022-03-30 PROCEDURE — 99204 OFFICE O/P NEW MOD 45 MIN: CPT | Performed by: INTERNAL MEDICINE

## 2022-03-30 PROCEDURE — 84550 ASSAY OF BLOOD/URIC ACID: CPT

## 2022-03-30 PROCEDURE — 86803 HEPATITIS C AB TEST: CPT

## 2022-03-30 PROCEDURE — 86200 CCP ANTIBODY: CPT

## 2022-03-30 PROCEDURE — 85652 RBC SED RATE AUTOMATED: CPT

## 2022-03-30 PROCEDURE — 36415 COLL VENOUS BLD VENIPUNCTURE: CPT

## 2022-03-30 NOTE — TELEPHONE ENCOUNTER
Patient saw rheumatologist and they are recommending that he switch from the venlafaxine to duloxetine.   Patient says they are saying he has fibromyalgia     Please call and advise patient if fine with changing the medication

## 2022-03-30 NOTE — PROGRESS NOTES
This document was created using a software with less than 100% fidelity, at times resulting in unintended, even erroneous syntax and grammar.  The reader is advised to keep this under consideration while reviewing, interpreting this note.      Rheumatology Consult Note      Paco Ponce     YOB: 1980 Age: 41 year old    Date of visit: 3/30/22    PCP: Douglas Longo    Chief Complaint   No chief complaint on file.      Assessment and Plan     Paco was seen today for consult.    Diagnoses and all orders for this visit:    Body aches  -     Adult Rheumatology  Referral  -     Cyclic Citrullinated Peptide Antibody IgG; Future  -     Erythrocyte sedimentation rate auto; Future  -     CRP inflammation; Future  -     Uric acid; Future  -     Hepatitis C antibody; Future  -     XR Shoulder 3 View Bilateral; Future  -     XR Shoulder 3 View Bilateral    Multiple joint pain  -     Adult Rheumatology  Referral  -     Cyclic Citrullinated Peptide Antibody IgG; Future  -     Erythrocyte sedimentation rate auto; Future  -     CRP inflammation; Future  -     Uric acid; Future  -     Hepatitis C antibody; Future  -     XR Shoulder 3 View Bilateral; Future  -     XR Shoulder 3 View Bilateral           This patient with several year history of polyarthralgia, polymyalgia, without risk factors for seronegative spondyloarthropathy group of conditions, with several features consistent with fibromyalgia is on Effexor for depression.  I have given him literature on duloxetine to review.  He is going to check with his primary physician if this can be prescribed instead.  Further work-up as noted above.  We will meet here in the next few weeks.       HPI   Paco Ponce is a 41 year old male  is seen today for evaluation of generalized aches, polyarthralgias, polymyalgias.  The symptoms have troubled him over the past several years.  Over the years he has found that there is no specific  "precipitating or aggravating factor.  There is no diurnal variation.  However across several days there are some days better than the others.  However he is never pain-free.  He has not observed swelling in the joints.  He goes to bed around 8:39 PM and keeps tossing and turning, wakes up in the morning 6 AM, unrefreshed, he has tried Tylenol without help he is to take ibuprofen that did not help but causes stomach upset.  His wife is often commented that he is \"always tired\".  He reports no history of psoriasis himself or the family ulcerative colitis or Crohn's disease.  No family history of rheumatoid arthritis or lupus.  His work-up in the past has been negative for acute phase response both sedimentation rate and CRP.  He works in the [employer] industry, non-smoker..  He has been told that she has diverticulosis.,  He at one point had diverticulitis he recalls laparotomy where \"2 feet of large intestine was taken out\".  He has been told that he has irritable bowel syndrome.  He often has this feeling that he has mental fogginess.  Hard for him to remember things.  He feels that he is light and sound sensitive.  He points to the anterior shoulders to denote the site of pain that is most bothersome for him.           Active Problem List     Patient Active Problem List   Diagnosis     CARDIOVASCULAR SCREENING; LDL GOAL LESS THAN 160     Hypertension goal BP (blood pressure) < 140/90     Tear of medial meniscus of left knee, current, unspecified tear type, subsequent encounter     Migraine with aura and without status migrainosus, not intractable     Kidney stone     Anxiety     Major depressive disorder     Morbid obesity (H)     Past Medical History     Past Medical History:   Diagnosis Date     Anxiety      Bowel obstruction (H)      Diverticulitis      Hypertension      Kidney stone      Major depressive disorder      Migraine      Obesity      Past Surgical History     Past Surgical History:   Procedure Laterality " Date     APPENDECTOMY       ARTHROSCOPY KNEE Left 02/01/2017    Procedure: Left knee arthroscopy and partial medial meniscectomy. Surgeon:  Yordan Main MD  Location: [facility]     ARTHROSCOPY KNEE Left 03/11/2020    Left knee arthroscopyand partial medial meniscectomy, Dr. Yordan Main, [facility]     ENT SURGERY       HERNIA REPAIR       Allergy     Allergies   Allergen Reactions     Amoxicillin Anaphylaxis     Tessalon [Benzonatate] Anaphylaxis     Current Medication List     Current Outpatient Medications   Medication Sig     hydrochlorothiazide (HYDRODIURIL) 25 MG tablet Take 1 tablet (25 mg) by mouth daily     lisinopril (ZESTRIL) 10 MG tablet Take 1 tablet (10 mg) by mouth daily     sildenafil (REVATIO) 20 MG tablet Take 2-5 tablets ( mg) by mouth daily 30-60 minutes before anticipated need for erectile dysfunction     venlafaxine (EFFEXOR-ER) 225 MG 24 hr tablet Take 1 tablet (225 mg) by mouth daily     No current facility-administered medications for this visit.            Family History   No family history on file.      Physical Exam     COMPREHENSIVE EXAMINATION:  Vitals:    03/30/22 1435   BP: 120/80   Pulse: 80   Weight: 120.5 kg (265 lb 9.6 oz)     A well appearing alert oriented male. Vital data as noted above. His eyes examined for inflammation/scleromalacia. ENT examined for oral mucositis, moisture, thrush, nasal deformity, external ear redness, deformity. His neck is examined for suppleness and lymphadenopathy.  Cardiopulmonary examination without dyspnea at rest, use of accessory muscles of breathing, wheezing, edema, peripheral or central cyanosis.  Abdomen examined for softness, tenderness, obvious organomegaly.  Skin examined for heliotrope, malar area eruption, lupus pernio, periungual erythema, sclerodactyly, papules, erythema nodosum, purpura, nail pitting, onycholysis, and obvious psoriasis lesion. Neurological examination done for alertness, speech, facial  symmetry,  tone and power in upper and lower extremities, and gait. The joint examination is performed for swelling, tenderness, warmth, erythema, and range of motion in the following joints: DIPs, PIPs, MCPs, wrists, first CMC's, elbows, shoulders, hips, knees, ankles, feet; spine for range of motion and paraspinal muscles for tenderness. The salient normal / abnormal findings are appended.  He has widespread tenderness and articular nonarticular areas across trapezius paraspinal region, proximal upper and lower extremities, no synovitis or dactylitis.  There is no facial rash.  He has  impingement in both shoulders on abduction.    Labs / Imaging (select studies)     Rheumatoid Factor   Date Value Ref Range Status   02/05/2018 <20 <20 IU/mL Final     Uric Acid   Date Value Ref Range Status   02/05/2018 6.3 3.5 - 7.2 mg/dL Final      Hemoglobin   Date Value Ref Range Status   12/09/2020 15.5 13.3 - 17.7 g/dL Final   05/22/2019 15.1 13.3 - 17.7 g/dL Final   12/23/2018 14.5 13.3 - 17.7 g/dL Final     Urea Nitrogen   Date Value Ref Range Status   12/09/2020 17 7 - 30 mg/dL Final   05/22/2019 16 7 - 30 mg/dL Final   04/30/2019 13 7 - 30 mg/dL Final     Sed Rate   Date Value Ref Range Status   02/05/2018 6 0 - 15 mm/h Final     CRP Inflammation   Date Value Ref Range Status   02/05/2018 <2.9 0.0 - 8.0 mg/L Final     AST   Date Value Ref Range Status   12/09/2020 24 0 - 45 U/L Final   05/22/2019 28 0 - 45 U/L Final   02/27/2018 20 0 - 45 U/L Final     Albumin   Date Value Ref Range Status   12/09/2020 4.2 3.4 - 5.0 g/dL Final   05/22/2019 4.4 3.4 - 5.0 g/dL Final   02/27/2018 4.1 3.4 - 5.0 g/dL Final     Alkaline Phosphatase   Date Value Ref Range Status   12/09/2020 103 40 - 150 U/L Final   05/22/2019 94 40 - 150 U/L Final   02/27/2018 101 40 - 150 U/L Final     ALT   Date Value Ref Range Status   12/09/2020 31 0 - 70 U/L Final   05/22/2019 41 0 - 70 U/L Final   02/27/2018 28 0 - 70 U/L Final     Rheumatoid Factor    Date Value Ref Range Status   02/05/2018 <20 <20 IU/mL Final          Immunization History     Immunization History   Administered Date(s) Administered     COVID-19,PF,Moderna 08/21/2021, 09/18/2021     FLU 6-35 months 01/19/2011     Influenza (IIV3) PF 11/30/2007, 10/28/2008     Influenza Vaccine IM > 6 months Valent IIV4 (Alfuria,Fluzone) 09/28/2016, 09/26/2017, 12/03/2020     MMR 12/07/1979, 12/03/2020     OPV, trivalent, live 11/05/1980, 06/24/1983     Tdap (Adacel,Boostrix) 02/20/2012

## 2022-03-31 LAB
CCP AB SER IA-ACNC: 0.6 U/ML
HCV AB SERPL QL IA: NEGATIVE

## 2022-03-31 RX ORDER — DULOXETIN HYDROCHLORIDE 30 MG/1
CAPSULE, DELAYED RELEASE ORAL
Qty: 46 CAPSULE | Refills: 0 | Status: SHIPPED | OUTPATIENT
Start: 2022-03-31 | End: 2022-04-05

## 2022-04-01 NOTE — TELEPHONE ENCOUNTER
Called and spoke with patient.     Advised of Dr. Longo's not below. Patient stated an understanding and agreed with plan.     Celia Reveles RN  [facility]

## 2022-04-01 NOTE — TELEPHONE ENCOUNTER
It'd be fine to switch from Venlafaxine to Cymbalta. They are in the same class of medication so I don't anticipate any issues making the change. I've sent new prescription in for Cymbalta. He can just make the switch without tapering of Effexor.    Douglas Longo,   3/31/2022 11:29 PM

## 2022-04-05 ENCOUNTER — OFFICE VISIT (OUTPATIENT)
Dept: FAMILY MEDICINE | Facility: CLINIC | Age: 42
End: 2022-04-05
Payer: COMMERCIAL

## 2022-04-05 VITALS
HEIGHT: 72 IN | WEIGHT: 265 LBS | SYSTOLIC BLOOD PRESSURE: 108 MMHG | DIASTOLIC BLOOD PRESSURE: 76 MMHG | BODY MASS INDEX: 35.89 KG/M2 | OXYGEN SATURATION: 95 % | HEART RATE: 118 BPM | RESPIRATION RATE: 18 BRPM | TEMPERATURE: 97.3 F

## 2022-04-05 DIAGNOSIS — E66.01 MORBID OBESITY (H): ICD-10-CM

## 2022-04-05 DIAGNOSIS — R10.9 CHRONIC ABDOMINAL PAIN: Primary | ICD-10-CM

## 2022-04-05 DIAGNOSIS — G89.29 CHRONIC ABDOMINAL PAIN: Primary | ICD-10-CM

## 2022-04-05 LAB
ERYTHROCYTE [DISTWIDTH] IN BLOOD BY AUTOMATED COUNT: 12.4 % (ref 10–15)
HCT VFR BLD AUTO: 41.7 % (ref 40–53)
HGB BLD-MCNC: 14.6 G/DL (ref 13.3–17.7)
MCH RBC QN AUTO: 30.1 PG (ref 26.5–33)
MCHC RBC AUTO-ENTMCNC: 35 G/DL (ref 31.5–36.5)
MCV RBC AUTO: 86 FL (ref 78–100)
PLATELET # BLD AUTO: 307 10E3/UL (ref 150–450)
RBC # BLD AUTO: 4.85 10E6/UL (ref 4.4–5.9)
WBC # BLD AUTO: 7.1 10E3/UL (ref 4–11)

## 2022-04-05 PROCEDURE — 36415 COLL VENOUS BLD VENIPUNCTURE: CPT | Performed by: NURSE PRACTITIONER

## 2022-04-05 PROCEDURE — 80053 COMPREHEN METABOLIC PANEL: CPT | Performed by: NURSE PRACTITIONER

## 2022-04-05 PROCEDURE — 83036 HEMOGLOBIN GLYCOSYLATED A1C: CPT | Performed by: NURSE PRACTITIONER

## 2022-04-05 PROCEDURE — 85027 COMPLETE CBC AUTOMATED: CPT | Performed by: NURSE PRACTITIONER

## 2022-04-05 PROCEDURE — 99215 OFFICE O/P EST HI 40 MIN: CPT | Performed by: NURSE PRACTITIONER

## 2022-04-05 PROCEDURE — 83690 ASSAY OF LIPASE: CPT | Performed by: NURSE PRACTITIONER

## 2022-04-05 RX ORDER — VENLAFAXINE HYDROCHLORIDE 225 MG/1
225 TABLET, EXTENDED RELEASE ORAL DAILY
COMMUNITY
Start: 2022-04-05 | End: 2022-08-09

## 2022-04-05 NOTE — RESULT ENCOUNTER NOTE
Dear Paco,     -Normal red blood cell (hgb) levels, normal white blood cell count and normal platelet levels.      Please send a [employer] message or call [phone]  if you have any questions.      GEN Landaverde, [facility] [place]    If you have further questions about the interpretation of your labs, [email or web address] is a good website to check out for further information.

## 2022-04-05 NOTE — PROGRESS NOTES
a  Assessment & Plan     Diagnoses and all orders for this visit:    Chronic abdominal pain  History of diverticulosis/diverticulitis and colon resection.   Recommended further consultation with GI as it has been several years since last follow-up.    -     Adult Gastro Ref - Consult Only; Future  -     CBC with platelets  -     Comprehensive metabolic panel (BMP + Alb, Alk Phos, ALT, AST, Total. Bili, TP)  -     Lipase    Morbid obesity (H)  Continue to work with patient regarding dietary and lifestyle modifications to support weight loss.        40 minutes spent on the date of the encounter doing chart review, history and exam, documentation and further activities per the note       BMI:   Estimated body mass index is 35.94 kg/m  as calculated from the following:    Height as of this encounter: 1.829 m (6').    Weight as of this encounter: 120.2 kg (265 lb).     Return in about 4 weeks (around 5/3/2022) for GI consultation.    Di Lanier, GEN [facility] [place] Antonio is a 41 year old who presents for the following health issues:      History of Present Illness       Reason for visit:  Stomach issues  Symptom onset:  More than a month  Symptoms include:  Stomach pain diarrhea irritable Bowels  Symptom intensity:  Severe  Symptom progression:  Staying the same  Had these symptoms before:  Yes  Has tried/received treatment for these symptoms:  Yes  Previous treatment was successful:  No    He eats 0-1 servings of fruits and vegetables daily.He consumes 3 sweetened beverage(s) daily.He exercises with enough effort to increase his heart rate 10 to 19 minutes per day.  He exercises with enough effort to increase his heart rate 3 or less days per week.   He is taking medications regularly.     Pain History:    When did you first notice your pain? -  constant - ongoing for a while   Have you seen any provider previously for this issue? Yes - years ago  How has your pain  affected your ability to work? 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     Abdominal/Flank Pain  Chronic IBS - stomach issues since being a young child.   Intermittent abdominal pain, bloating, diarrhea and gas.    Worsening pain over the past couple weeks.      Has tried fiber and dietary changes.     History of diverticulosis, surgery for diverticulitis.   History of colon resection.    Worsening with stress.    Aggravated with eating.    Several colonoscopies -   Last GI consultation was several years ago.      Onset/Duration: constant   Description:   Character: Sharp and Cramping  Location: epigastric region  Radiation: None  Intensity: moderate  Progression of Symptoms:  constant  Accompanying Signs & Symptoms:  Fever/Chills: no  Gas/Bloating: YES  Nausea: no  Vomitting: no  Diarrhea: YES- blood in BM's   Constipation: no  Dysuria or Hematuria: no  History:   Trauma: no  Previous similar pain: YES  Previous tests done: over 5 years ago  Precipitating factors:   Does the pain change with:     Food: YES    Bowel Movement: YES    Urination: no   Other factors:  no  Therapies tried and outcome: Fiber, avoiding certain foods.     Consultation on 3/30/22 for several year history of polyarthralgia, polymyalgia - several features consistent with fibromyalgia.  Further work-up by rheumatology.      Changed from Effexor to Duloxetine, did not tolerate well - caused acute blurry vision.    Stopped Duloxetine and is restarting Effexor.      Review of Systems   Constitutional, HEENT, cardiovascular, pulmonary, gi and gu systems are negative, except as otherwise noted.      Objective    /76   Pulse 118   Temp 97.3  F (36.3  C)   Resp 18   Ht 1.829 m (6')   Wt 120.2 kg (265 lb)   SpO2 95%   BMI 35.94 kg/m    Body mass index is 35.94 kg/m .  Physical Exam   GENERAL: healthy, alert and no distress  RESP: lungs clear to auscultation -  no rales, rhonchi or wheezes  CV: regular rate and rhythm, normal S1 S2, no S3 or S4, no murmur, click or rub, no peripheral edema  ABDOMEN: soft, diffuse distention and tenderness to palpation, no rebound ttp or guarding,  no hepatosplenomegaly, no masses and bowel sounds normal  PSYCH: mentation appears normal, affect normal/bright

## 2022-04-05 NOTE — PATIENT INSTRUCTIONS
Low FODMAP diet -- We suggest a diet low in fermentable oligo-, di-, and monosaccharides and polyols (FODMAPs) in patients with IBS with abdominal bloating or pain despite exclusion of gas-producing foods (table 1). These short-chain carbohydrates are poorly absorbed and are osmotically active in the intestinal lumen where they are rapidly fermented, resulting in symptoms of abdominal bloating and pain. A low FODMAP diet involves elimination of a larger number of high FODMAP foods that would not be excluded in a diet that only required avoidance of gas-producing foods (eg, foods that contain fructose, including honey, high-fructose corn syrup, apples, pears, mangoes, cherries, or oligosaccharides, including wheat). Low FODMAP dietary education should be provided by a trained dietician to avoid unnecessary dietary over-restriction and a nutritionally replete diet [15]. Low FODMAP education consists of initially eliminating FODMAPs from the diet for six to eight weeks and then, following symptom resolution, gradual reintroduction of foods high in fermentable carbohydrates to determine individual tolerance to specific fermentable carbohydrates [16].  Studies have demonstrated an improvement in IBS symptoms with FODMAP restriction [17-21]. In a randomized, single-blind, crossover trial, 30 patients with IBS and 8 healthy controls were assigned to 21 days of a diet low in FODMAPs or a moderate FODMAP [place] diet followed by a 21-day washout period before crossing over to an alternate diet [22]. Subjects with IBS, but not controls, had significantly lower overall gastrointestinal symptoms scores with an improvement in scores for abdominal pain, bloating, flatulence, and dissatisfaction with stool consistency while on a low FODMAP diet as compared with the moderate FODMAP diet and their diet at baseline. In another randomized trial 92 patients with IBS-D were assigned to a four week trial of a low FODMAP diet or  modified National [place] for [employer] and Care Excellence dietary recommendations (small frequent meals, avoid trigger foods, and avoid excess alcohol and caffeine). There was no significant difference in the proportion of patients reporting adequate relief of IBS-D [21]. However, the low FODMAP group exhibited significantly higher rates of improvement in pain (51 versus 23 percent) and greater reductions in average daily scores for abdominal pain, bloating, stool consistency, frequency, and urgency.

## 2022-04-06 LAB
ALBUMIN SERPL-MCNC: 4.5 G/DL (ref 3.4–5)
ALP SERPL-CCNC: 113 U/L (ref 40–150)
ALT SERPL W P-5'-P-CCNC: 40 U/L (ref 0–70)
ANION GAP SERPL CALCULATED.3IONS-SCNC: 7 MMOL/L (ref 3–14)
AST SERPL W P-5'-P-CCNC: 31 U/L (ref 0–45)
BILIRUB SERPL-MCNC: 0.3 MG/DL (ref 0.2–1.3)
BUN SERPL-MCNC: 15 MG/DL (ref 7–30)
CALCIUM SERPL-MCNC: 9.9 MG/DL (ref 8.5–10.1)
CHLORIDE BLD-SCNC: 103 MMOL/L (ref 94–109)
CO2 SERPL-SCNC: 27 MMOL/L (ref 20–32)
CREAT SERPL-MCNC: 1.04 MG/DL (ref 0.66–1.25)
GFR SERPL CREATININE-BSD FRML MDRD: >90 ML/MIN/1.73M2
GLUCOSE BLD-MCNC: 163 MG/DL (ref 70–99)
HBA1C MFR BLD: 6.8 % (ref 0–5.6)
LIPASE SERPL-CCNC: 116 U/L (ref 73–393)
POTASSIUM BLD-SCNC: 4.7 MMOL/L (ref 3.4–5.3)
PROT SERPL-MCNC: 7.9 G/DL (ref 6.8–8.8)
SODIUM SERPL-SCNC: 137 MMOL/L (ref 133–144)

## 2022-04-07 ENCOUNTER — MYC MEDICAL ADVICE (OUTPATIENT)
Dept: FAMILY MEDICINE | Facility: CLINIC | Age: 42
End: 2022-04-07
Payer: COMMERCIAL

## 2022-04-07 NOTE — RESULT ENCOUNTER NOTE
Dear Paco,     -I added on a Hemoglobin A1C due to the elevated blood sugar.  Your Hgb A1C (diabetic test) is elevated and a sign of diabetes. Pre-diabetes is (5.7-6.4%) and diagnosis of diabetes occurs at a HgbA1C at 6.5% and above.  ADVISE: scheduling an appointment to discuss treatments and management.        Please send a [employer] message or call [phone]  if you have any questions.      GEN Landaverde, CNP  [facility] [place]    If you have further questions about the interpretation of your labs, [email or web address] is a good website to check out for further information.

## 2022-04-14 ENCOUNTER — OFFICE VISIT (OUTPATIENT)
Dept: FAMILY MEDICINE | Facility: CLINIC | Age: 42
End: 2022-04-14
Payer: COMMERCIAL

## 2022-04-14 VITALS
DIASTOLIC BLOOD PRESSURE: 86 MMHG | BODY MASS INDEX: 35.35 KG/M2 | OXYGEN SATURATION: 98 % | HEART RATE: 98 BPM | SYSTOLIC BLOOD PRESSURE: 114 MMHG | TEMPERATURE: 96.8 F | WEIGHT: 261 LBS | HEIGHT: 72 IN

## 2022-04-14 DIAGNOSIS — M79.7 FIBROMYALGIA: Primary | ICD-10-CM

## 2022-04-14 DIAGNOSIS — R14.0 BLOATING: ICD-10-CM

## 2022-04-14 DIAGNOSIS — R73.09 ELEVATED HEMOGLOBIN A1C: ICD-10-CM

## 2022-04-14 LAB — HBA1C MFR BLD: 7 % (ref 0–5.6)

## 2022-04-14 PROCEDURE — 99213 OFFICE O/P EST LOW 20 MIN: CPT | Performed by: FAMILY MEDICINE

## 2022-04-14 PROCEDURE — 83036 HEMOGLOBIN GLYCOSYLATED A1C: CPT | Performed by: FAMILY MEDICINE

## 2022-04-14 PROCEDURE — 36415 COLL VENOUS BLD VENIPUNCTURE: CPT | Performed by: FAMILY MEDICINE

## 2022-04-14 RX ORDER — VENLAFAXINE HYDROCHLORIDE 225 MG/1
225 TABLET, EXTENDED RELEASE ORAL DAILY
Status: CANCELLED | OUTPATIENT
Start: 2022-04-14

## 2022-04-14 RX ORDER — SILDENAFIL CITRATE 20 MG/1
TABLET ORAL
Qty: 30 TABLET | Refills: 0 | Status: CANCELLED | OUTPATIENT
Start: 2022-04-14

## 2022-04-14 ASSESSMENT — ANXIETY QUESTIONNAIRES
4. TROUBLE RELAXING: NOT AT ALL
GAD7 TOTAL SCORE: 0
GAD7 TOTAL SCORE: 0
3. WORRYING TOO MUCH ABOUT DIFFERENT THINGS: NOT AT ALL
GAD7 TOTAL SCORE: 0
7. FEELING AFRAID AS IF SOMETHING AWFUL MIGHT HAPPEN: NOT AT ALL
6. BECOMING EASILY ANNOYED OR IRRITABLE: NOT AT ALL
7. FEELING AFRAID AS IF SOMETHING AWFUL MIGHT HAPPEN: NOT AT ALL
5. BEING SO RESTLESS THAT IT IS HARD TO SIT STILL: NOT AT ALL
1. FEELING NERVOUS, ANXIOUS, OR ON EDGE: NOT AT ALL
2. NOT BEING ABLE TO STOP OR CONTROL WORRYING: NOT AT ALL

## 2022-04-14 ASSESSMENT — PATIENT HEALTH QUESTIONNAIRE - PHQ9
10. IF YOU CHECKED OFF ANY PROBLEMS, HOW DIFFICULT HAVE THESE PROBLEMS MADE IT FOR YOU TO DO YOUR WORK, TAKE CARE OF THINGS AT HOME, OR GET ALONG WITH OTHER PEOPLE: NOT DIFFICULT AT ALL
SUM OF ALL RESPONSES TO PHQ QUESTIONS 1-9: 2
SUM OF ALL RESPONSES TO PHQ QUESTIONS 1-9: 2

## 2022-04-14 NOTE — PROGRESS NOTES
SUBJECTIVE:   CC: Paco Ponce is an 41 year old male who presents for follow up        Hypertension Follow-up      Do you check your blood pressure regularly outside of the clinic? No     Are you following a low salt diet? Yes    BP Readings from Last 2 Encounters:   04/14/22 114/86   04/05/22 108/76     Hemoglobin A1C (%)   Date Value   04/05/2022 6.8 (H)     LDL Cholesterol Calculated (mg/dL)   Date Value   05/22/2019 119 (H)   02/29/2016 106 (H)       Depression and Anxiety Follow-Up    How are you doing with your depression since your last visit? No change    How are you doing with your anxiety since your last visit?  No change    Are you having other symptoms that might be associated with depression or anxiety? No    Have you had a significant life event? No     Do you have any concerns with your use of alcohol or other drugs? No    Social History     Tobacco Use     Smoking status: Never Smoker     Smokeless tobacco: Never Used   Substance Use Topics     Alcohol use: Yes     Alcohol/week: 0.0 standard drinks     Comment: per wk 1-2     Drug use: No     PHQ 3/17/2020 2/10/2022 4/14/2022   PHQ-9 Total Score 1 2 2   Q9: Thoughts of better off dead/self-harm past 2 weeks Not at all Not at all Not at all   F/U: Thoughts of suicide or self-harm - - -   F/U: Safety concerns - - -     MAAME-7 SCORE 3/17/2020 2/10/2022 4/14/2022   Total Score - - 0 (minimal anxiety)   Total Score 1 4 0     Last PHQ-9 4/14/2022   1.  Little interest or pleasure in doing things 0   2.  Feeling down, depressed, or hopeless 0   3.  Trouble falling or staying asleep, or sleeping too much 1   4.  Feeling tired or having little energy 1   5.  Poor appetite or overeating 0   6.  Feeling bad about yourself 0   7.  Trouble concentrating 0   8.  Moving slowly or restless 0   Q9: Thoughts of better off dead/self-harm past 2 weeks 0   PHQ-9 Total Score 2   Difficulty at work, home, or with people -   In the past two weeks have you had  thoughts of suicide or self harm? -   Do you have concerns about your personal safety or the safety of others? -     MAAME-7  4/14/2022   1. Feeling nervous, anxious, or on edge 0   2. Not being able to stop or control worrying 0   3. Worrying too much about different things 0   4. Trouble relaxing 0   5. Being so restless that it is hard to sit still 0   6. Becoming easily annoyed or irritable 0   7. Feeling afraid, as if something awful might happen 0   MAAME-7 Total Score 0   If you checked any problems, how difficult have they made it for you to do your work, take care of things at home, or get along with other people? -       Suicide Assessment Five-step Evaluation and Treatment (SAFE-T)      Today's PHQ-2 Score:   Abuse: Current or Past(Physical, Sexual or Emotional)- No  Do you feel safe in your environment? Yes    Have you ever done Advance Care Planning? (For example, a Health Directive, POLST, or a discussion with a medical provider or your loved ones about your wishes): No, advance care planning information given to patient to review.  Patient declined advance care planning discussion at this time.    Social History     Tobacco Use     Smoking status: Never Smoker     Smokeless tobacco: Never Used   Substance Use Topics     Alcohol use: Yes     Alcohol/week: 0.0 standard drinks     Comment: per wk 1-2     If you drink alcohol do you typically have >3 drinks per day or >7 drinks per week? No        Review of Systems   ROS: 10 point ROS neg other than the symptoms noted above in the HPI.      OBJECTIVE:   /86 (BP Location: Left arm, Patient Position: Sitting, Cuff Size: Adult Large)   Pulse 98   Temp 96.8  F (36  C) (Tympanic)   Ht 1.829 m (6')   Wt 118.4 kg (261 lb)   SpO2 98%   BMI 35.40 kg/m      Physical Exam  GENERAL: healthy, alert and no distress  PSYCH: mentation appears normal, affect normal/bright    Diagnostic Test Results:  Labs reviewed in Epic    ASSESSMENT/PLAN:       ICD-10-CM    1.  Fibromyalgia  M79.7    2. Bloating  R14.0 Helicobacter pylori Antigen Stool     Helicobacter pylori Antigen Stool   3. Elevated hemoglobin A1c  R73.09 Hemoglobin A1c     Hemoglobin A1c   Paco has history of chronic abdominal pain, diverticulosis/diverticulitis status post colon resection.  He does have follow-up scheduled with GI in May.  He still complains of significant loading and discomfort.  We will check H. pylori antigen today.  He is also been diagnosed with fibromyalgia by rheumatology given multiple tender points throughout his body.  Given ongoing pain, intolerance to duloxetine, we decided to seek treatment with medical cannabis.  He was certified today.  We also discussed his recent lab work including elevation of A1c to 6.8.  We will recheck this to confirm diagnosis of diabetes mellitus type 2.  We discussed options for treatment at this visit including Metformin and other oral therapies.  Given his already chronic abdominal type issues I would approach Metformin with caution as this could cause further GI discomfort and diarrhea.  If A1c continues to be under 7, I think he may be able to start treatment through diet and lifestyle changes.  I think if we are able to get his pain under better control, it would enable him to be more active and also lose weight.        Estimated body mass index is 35.4 kg/m  as calculated from the following:    Height as of this encounter: 1.829 m (6').    Weight as of this encounter: 118.4 kg (261 lb).     Weight management plan: Discussed healthy diet and exercise guidelines    He reports that he has never smoked. He has never used smokeless tobacco.      Counseling Resources:  ATP IV Guidelines  Pooled Cohorts Equation Calculator  FRAX Risk Assessment  ICSI Preventive Guidelines  Dietary Guidelines for Americans, 2010  USDA's MyPlate  ASA Prophylaxis  Lung CA Screening    Douglas Longo DO  [facility] [place]  Answers for HPI/ROS submitted by the  patient on 4/14/2022  If you checked off any problems, how difficult have these problems made it for you to do your work, take care of things at home, or get along with other people?: Not difficult at all  PHQ9 TOTAL SCORE: 2  MAAME 7 TOTAL SCORE: 0

## 2022-04-15 ASSESSMENT — PATIENT HEALTH QUESTIONNAIRE - PHQ9: SUM OF ALL RESPONSES TO PHQ QUESTIONS 1-9: 2

## 2022-04-15 ASSESSMENT — ANXIETY QUESTIONNAIRES: GAD7 TOTAL SCORE: 0

## 2022-04-21 DIAGNOSIS — E11.9 TYPE 2 DIABETES MELLITUS WITHOUT COMPLICATION, WITHOUT LONG-TERM CURRENT USE OF INSULIN (H): Primary | ICD-10-CM

## 2022-04-21 RX ORDER — LANCETS
EACH MISCELLANEOUS
Qty: 200 EACH | Refills: 6 | Status: SHIPPED | OUTPATIENT
Start: 2022-04-21 | End: 2022-04-30

## 2022-04-21 RX ORDER — GLUCOSAMINE HCL/CHONDROITIN SU 500-400 MG
CAPSULE ORAL
Qty: 100 EACH | Refills: 3 | Status: SHIPPED | OUTPATIENT
Start: 2022-04-21 | End: 2022-04-30

## 2022-04-25 ENCOUNTER — TELEPHONE (OUTPATIENT)
Dept: FAMILY MEDICINE | Facility: CLINIC | Age: 42
End: 2022-04-25
Payer: COMMERCIAL

## 2022-04-25 NOTE — TELEPHONE ENCOUNTER
Diabetes Education Scheduling Outreach #1:    Call to patient to schedule. Patient is at work and cannot schedule right now.    Also sent [employer] message to call to schedule.    Angie Botello OnCall  Diabetes and Nutrition Scheduling

## 2022-04-29 ENCOUNTER — TELEPHONE (OUTPATIENT)
Dept: GASTROENTEROLOGY | Facility: CLINIC | Age: 42
End: 2022-04-29
Payer: COMMERCIAL

## 2022-04-29 NOTE — TELEPHONE ENCOUNTER
ELSIM for patient to call  to reschedule appointment from 10/25 to a different date with Dr. Beckham

## 2022-04-30 ENCOUNTER — HOSPITAL ENCOUNTER (EMERGENCY)
Facility: CLINIC | Age: 42
Discharge: HOME | End: 2022-04-30
Attending: EMERGENCY MEDICINE | Admitting: EMERGENCY MEDICINE
Payer: COMMERCIAL

## 2022-04-30 VITALS
DIASTOLIC BLOOD PRESSURE: 98 MMHG | BODY MASS INDEX: 35.12 KG/M2 | TEMPERATURE: 97.8 F | RESPIRATION RATE: 18 BRPM | WEIGHT: 265 LBS | HEART RATE: 87 BPM | HEIGHT: 73 IN | SYSTOLIC BLOOD PRESSURE: 149 MMHG | OXYGEN SATURATION: 98 %

## 2022-04-30 DIAGNOSIS — S05.01XA ABRASION OF RIGHT CORNEA, INITIAL ENCOUNTER: ICD-10-CM

## 2022-04-30 PROCEDURE — 99283 EMERGENCY DEPT VISIT LOW MDM: CPT

## 2022-04-30 RX ORDER — TETRACAINE HYDROCHLORIDE 5 MG/ML
SOLUTION OPHTHALMIC
Status: DISCONTINUED
Start: 2022-04-30 | End: 2022-04-30 | Stop reason: HOSPADM

## 2022-04-30 RX ORDER — ERYTHROMYCIN 5 MG/G
0.5 OINTMENT OPHTHALMIC 4 TIMES DAILY
Qty: 4 G | Refills: 0 | Status: SHIPPED | OUTPATIENT
Start: 2022-04-30 | End: 2022-05-05

## 2022-04-30 NOTE — ED PROVIDER NOTES
Visit Date: 04/30/2022    CHIEF COMPLAINT:  Right eye pain.    HISTORY OF PRESENT ILLNESS:  This is a 41-year-old gentleman who presents with a tearing and pain in his right eye.  He does not remember getting anything in his eye though works in [employer], so states it is possible he had some debris get into his eye.  He does not wear contacts.  No purulent drainage.  Light bothers his eye.  He has no other complaints at this time.    PAST MEDICAL HISTORY:    1.  Hypertension.  2.  Obesity.  3.  Kidney stone.  4.  Diverticulitis.  5.  History of bowel obstruction.  6.  Depression.  7.  Migraine.  8.  Anxiety.    PAST SURGICAL HISTORY:    1.  Left sided knee arthroscopies.  2.  Colonoscopy.  3.  ENT surgery.  4.  Hernia repair.  5.  Appendectomy.    MEDICATIONS:    1.  Hydrochlorothiazide.  2.  Lisinopril.  3.  Sildenafil.  4.  Venlafaxine.    ALLERGIES:    1.  AMOXICILLIN.  2.  TESSALON.    SOCIAL HISTORY:  The patient presents by himself.  He does not smoke.    REVIEW OF SYSTEMS:  A pertinent 10-point review of systems is negative except for that noted in the HPI.    PHYSICAL EXAMINATION:    GENERAL:  The patient is uncomfortable appearing and closing his right eye.  Otherwise, appropriate with interaction.  VITAL SIGNS:  Blood pressure 151/102, heart rate 83, respiratory rate 16, oxygen 96% on room air, temperature is 97.8 degrees.  Eyes:  Pupils equal, round, reactive to light.  Extraocular movements intact.  He has clear tearing from the right eye.  There is scleral injection noted.  Over the center of the pupil.  There is a corneal defect with fluorescein uptake.  No ulceration.  No foreign bodies.  No hyphema.  RESPIRATORY:  Normal effort.  NEUROLOGIC:  The patient is alert and oriented x4.  PSYCHIATRIC:  He has normal mood and affect.  SKIN:  No pertinent skin findings.    LABORATORY EVALUATION AND DIAGNOSTIC STUDIES:  None.    EMERGENCY DEPARTMENT COURSE AND MEDICAL DECISION MAKING:  This is a  41-year-old gentleman who presents with a corneal abrasion and pain in his right eye.  Upon placement of topical tetracaine, he had a complete resolution of his pain, which would speak against acute angle glaucoma or other internal structural issues.  This would also make iritis less likely.  He does have clinically obvious corneal abrasion, which we will treat supportively with topical erythromycin antibiotic eye ointment and ophthalmology follow up if his symptoms are not improving on Monday.    DIAGNOSIS:  Right-sided corneal abrasion.    PLAN OF CARE:  As above.    Hu Morales MD        D: 2022   T: 2022   MT: [id number]    Name:     EDWARD OWUSU  MRN:      [id number]        Account:    [id number]   :      1980           Visit Date: 2022     Document: [id number]

## 2022-04-30 NOTE — ED TRIAGE NOTES
Right eye itchy yesterday. Now has shooting pain with opening eye and exposing to light. Denies known trauma to eye. Denies vision changes to eye, redness and swelling noted.

## 2022-05-05 ENCOUNTER — TRANSFERRED RECORDS (OUTPATIENT)
Dept: HEALTH INFORMATION MANAGEMENT | Facility: CLINIC | Age: 42
End: 2022-05-05
Payer: COMMERCIAL

## 2022-05-05 LAB — TSH SERPL-ACNC: 2.59 UIU/ML (ref 0.45–4.5)

## 2022-05-16 ENCOUNTER — TELEPHONE (OUTPATIENT)
Dept: GASTROENTEROLOGY | Facility: CLINIC | Age: 42
End: 2022-05-16
Payer: COMMERCIAL

## 2022-05-16 NOTE — TELEPHONE ENCOUNTER
talked with patient after several calls, he said he did not want to reschedule, he wants to cancel the appt with Dr Cruz

## 2022-05-16 NOTE — TELEPHONE ENCOUNTER
talked with patient about rescheduling appt with Dr Beckham, after several calls, patient said he will not reschedule that he wants the appt canceled

## 2022-05-20 ENCOUNTER — TELEPHONE (OUTPATIENT)
Dept: GASTROENTEROLOGY | Facility: CLINIC | Age: 42
End: 2022-05-20
Payer: COMMERCIAL

## 2022-05-20 NOTE — TELEPHONE ENCOUNTER
Called pt to reschedule his 10/25 with Dr. Beckham and his voice mail box was full so I was unable to LVM       By Yamilex Novoa

## 2022-08-05 DIAGNOSIS — F33.9 MAJOR DEPRESSIVE DISORDER, RECURRENT, UNSPECIFIED (H): ICD-10-CM

## 2022-08-06 ENCOUNTER — HEALTH MAINTENANCE LETTER (OUTPATIENT)
Age: 42
End: 2022-08-06

## 2022-08-09 RX ORDER — VENLAFAXINE HYDROCHLORIDE 225 MG/1
TABLET, EXTENDED RELEASE ORAL
Qty: 90 TABLET | Refills: 1 | Status: SHIPPED | OUTPATIENT
Start: 2022-08-09 | End: 2023-07-27

## 2022-08-09 NOTE — TELEPHONE ENCOUNTER
Pending Prescriptions:                       Disp   Refills    venlafaxine (EFFEXOR-ER) 225 MG 24 hr tabl*90 tab*         Sig: TAKE 1 TABLET BY MOUTH EVERY DAY    Routing refill request to provider for review/approval because:  Medication is reported/historical  BP Readings from Last 3 Encounters:   04/30/22 (!) 149/98   04/14/22 114/86   04/05/22 108/76

## 2022-10-22 ENCOUNTER — HEALTH MAINTENANCE LETTER (OUTPATIENT)
Age: 42
End: 2022-10-22

## 2022-11-05 DIAGNOSIS — I10 HYPERTENSION GOAL BP (BLOOD PRESSURE) < 140/90: ICD-10-CM

## 2022-11-09 RX ORDER — LISINOPRIL 10 MG/1
10 TABLET ORAL DAILY
Qty: 90 TABLET | Refills: 1 | Status: SHIPPED | OUTPATIENT
Start: 2022-11-09 | End: 2023-05-10

## 2022-11-09 RX ORDER — HYDROCHLOROTHIAZIDE 25 MG/1
TABLET ORAL
Qty: 90 TABLET | Refills: 1 | Status: SHIPPED | OUTPATIENT
Start: 2022-11-09 | End: 2023-05-10

## 2022-11-09 NOTE — TELEPHONE ENCOUNTER
Routing refill request to provider for review/approval because:  BP Readings from Last 3 Encounters:   04/30/22 (!) 149/98   04/14/22 114/86   04/05/22 108/76      Ashli Ragland RN

## 2022-12-04 ENCOUNTER — HEALTH MAINTENANCE LETTER (OUTPATIENT)
Age: 42
End: 2022-12-04

## 2023-03-13 ENCOUNTER — TELEPHONE (OUTPATIENT)
Dept: FAMILY MEDICINE | Facility: CLINIC | Age: 43
End: 2023-03-13
Payer: COMMERCIAL

## 2023-03-13 NOTE — TELEPHONE ENCOUNTER
Here are some names through [facility]:    Dr. Anisha Garcia, [facility]    Douglas Longo DO  3/13/2023 10:05 AM

## 2023-03-13 NOTE — TELEPHONE ENCOUNTER
Pt is requesting provider's referral/ personal opinion on a therapist for emotional reactivity. Referred pt to ins for in network therapist but pt is wanting provider to give a few names to compare.     Pt call back: [phone] Detailed VM OK    Ashia Mariscal

## 2023-04-01 ENCOUNTER — HEALTH MAINTENANCE LETTER (OUTPATIENT)
Age: 43
End: 2023-04-01

## 2023-05-04 DIAGNOSIS — I10 HYPERTENSION GOAL BP (BLOOD PRESSURE) < 140/90: ICD-10-CM

## 2023-05-08 NOTE — TELEPHONE ENCOUNTER
Routing refill request to provider for review/approval because:        ACE Inhibitors (Including Combos) Protocol Failed 05/04/2023 03:36 AM   Protocol Details  Blood pressure under 140/90 in past 12 months    Recent (12 mo) or future (30 days) visit within the authorizing provider's specialty    Normal serum creatinine on file in past 12 months    Normal serum potassium on file in past 12 months          Please schedule pt for fasting PX then route to PCP for refill     Thank you     Rachell Callahan RN, BSN  [facility]

## 2023-05-10 ENCOUNTER — TELEPHONE (OUTPATIENT)
Dept: FAMILY MEDICINE | Facility: CLINIC | Age: 43
End: 2023-05-10
Payer: COMMERCIAL

## 2023-05-10 DIAGNOSIS — I10 HYPERTENSION GOAL BP (BLOOD PRESSURE) < 140/90: ICD-10-CM

## 2023-05-10 RX ORDER — LISINOPRIL 10 MG/1
10 TABLET ORAL DAILY
Qty: 90 TABLET | Refills: 1 | Status: SHIPPED | OUTPATIENT
Start: 2023-05-10 | End: 2023-07-27

## 2023-05-10 RX ORDER — HYDROCHLOROTHIAZIDE 25 MG/1
TABLET ORAL
Qty: 90 TABLET | Refills: 1 | Status: SHIPPED | OUTPATIENT
Start: 2023-05-10 | End: 2023-07-27

## 2023-05-10 NOTE — TELEPHONE ENCOUNTER
Medication Question or Refill        What medication are you calling about (include dose and sig)?: lisinopril (ZESTRIL) 10 MG tablet  And hydrochlorothiazide (HYDRODIURIL)  25 MG tablet    Preferred Pharmacy:   [place] [address] [place] [id number] [facility] [address]  [address] [facility] [address] [facility] [address]  Phone: [phone] Fax: [phone]    Controlled Substance Agreement on file:   CSA -- Patient Level:    CSA: None found at the patient level.       Who prescribed the medication?: Dr. Longo    Do you need a refill? Yes    When did you use the medication last? ?    Patient offered an appointment? Yes: Pt has an appt scheduled    Do you have any questions or concerns? Patient is out.      Could we [facility] you [facility] or would you prefer to receive a phone call?:   Patient would prefer a phone call   Okay to leave a detailed message?: Yes at Cell number on file:    Telephone Information:   Mobile [phone]

## 2023-05-10 NOTE — TELEPHONE ENCOUNTER
Routing to pcp     Future Appointments 5/10/2023 - 11/6/2023      Date Visit Type Length Department Provider     6/19/2023  2:00 PM PREVENTATIVE ADULT 30 min [facility] Douglas Longo, DO    Location Instructions:     [facility] is located at [address] [place] [address], along [address]. Free parking is available; access the lot by turning north from [address] onto [facility], then west onto [facility].                 Mi CLAY RN   [facility] Triage

## 2023-05-10 NOTE — TELEPHONE ENCOUNTER
See 5/4 refill encounter-closing this encounter. Refill encounter was sent to provider for approval.    Mi CLAY RN   [facility] Triage

## 2023-06-01 ENCOUNTER — HEALTH MAINTENANCE LETTER (OUTPATIENT)
Age: 43
End: 2023-06-01

## 2023-06-28 NOTE — PROGRESS NOTES
Paco Ponce is being followed for Blood Pressure management.    NURSING ASSESSMENT/PLAN:  Blood pressure reading today is not at the provider specified goal of <140/90.    Current blood pressure medication(s):  Hydrochlorothiazide 25mg daily  1.  The patient will begin: lisinopril 10mg daily.     2.  We will not be checking a metabolic lab panel today.      3.  Follow up instructions include:     Next Provider visit: Follow up in 1 week to follow up on symptoms described below. Patient is also advised that if symptoms reoccur prior to appointment to proceed to Urgent Care or ER for further evaluation. Also, encouraged to call the clinic if further questions or concerns.    SUBJECTIVE:                                                    The patient is taking medication as prescribed and is tolerating well.   Patient is not monitoring Blood Pressure at home.     In addition notes: Patient reports 3 episodes over the last month where he suddenly feels very chilled and aches all over his body. These episodes can last a day or two and then he returns to his baseline. He will also sometime break out into a sweat and feel he is soaked. He is in the middle of moving and he and his wife are currently living in a camper so he does not have a thermometer so, it is unclear if these symptoms are being caused by a fever. He is wondering if he could be having spikes in his blood pressure that are causing this.    Out of the following complicating factors: Cough, Headache, Lightheadedness, Shortness of breath, Fatigue, Nausea, Sexual Dysfunction, New onset of swelling or edema, Weakness and New onset of Chest Pain, the patient reports:  Headache and Symptoms discussed with provider    OBJECTIVE:                                                    Is pulse 55 or greater? - Yes  Pulse Readings from Last 1 Encounters:   05/14/19 102     Today's BP completed using cuff size: large on left side  arm.  BP Readings from Last 3  Results letter mailed to patient   Encounters:   05/14/19 (!) 148/100   04/30/19 (!) 150/94   04/19/19 136/88       Current Outpatient Medications   Medication Sig Dispense Refill     hydrochlorothiazide (HYDRODIURIL) 25 MG tablet Take 1 tablet (25 mg) by mouth daily 90 tablet 0     lisinopril (PRINIVIL/ZESTRIL) 10 MG tablet Take 1 tablet (10 mg) by mouth daily 90 tablet 0     cholecalciferol (VITAMIN D3) 1000 UNIT tablet Take 1 tablet (1,000 Units) by mouth daily (Patient not taking: Reported on 4/19/2019) 100 tablet 3     cyclobenzaprine (FLEXERIL) 10 MG tablet Take 0.5-1 tablets (5-10 mg) by mouth 3 times daily as needed for muscle spasms 30 tablet 0     diphenhydrAMINE (BENADRYL) 25 MG tablet Take 1-2 tablets (25-50 mg) by mouth every 6 hours as needed for allergies (Patient not taking: Reported on 4/19/2019) 56 tablet 0     ketorolac (TORADOL) 10 MG tablet Take 1 tablet (10 mg) by mouth every 6 hours as needed for moderate pain 20 tablet 0     omeprazole (PRILOSEC) 20 MG CR capsule Take 1 capsule (20 mg) by mouth daily 90 capsule 1     SUMAtriptan (IMITREX) 100 MG tablet Take 1 tablet (100 mg) by mouth at onset of headache for migraine May repeat in 2 hours. Max 2 tablets/24 hours. (Patient not taking: Reported on 4/19/2019) 9 tablet 1     venlafaxine (EFFEXOR-XR) 150 MG 24 hr capsule TAKE 1 CAPSULE (150 MG) BY MOUTH DAILY 90 capsule 1     Potassium   Date Value Ref Range Status   04/30/2019 4.4 3.4 - 5.3 mmol/L Final     Creatinine   Date Value Ref Range Status   04/30/2019 1.28 (H) 0.66 - 1.25 mg/dL Final     Urea Nitrogen   Date Value Ref Range Status   04/30/2019 13 7 - 30 mg/dL Final     GFR Estimate   Date Value Ref Range Status   04/30/2019 69 >60 mL/min/[1.73_m2] Final     Comment:     Non  GFR Calc  Starting 12/18/2018, serum creatinine based estimated GFR (eGFR) will be   calculated using the Chronic Kidney Disease Epidemiology Collaboration   (CKD-EPI) equation.        A baseline potassium, creatinine, BUN, GFR has  been done within past 12 months    Education:  general discussion/verbal explanation  These interventions were used: Permission to raise concern or advise and Open ended questions  Patient was given an opportunity to ask questions.    Patient verbalized understanding of this plan and is agreeable.    Copy of current RN HTN MGMT order or refer to Other Orders    Dosage adjustment made based on patient specific, physician directed, care plan.  Mary Burrell, RN

## 2023-06-30 ENCOUNTER — HOSPITAL ENCOUNTER (EMERGENCY)
Facility: CLINIC | Age: 43
Discharge: HOME | End: 2023-06-30
Attending: EMERGENCY MEDICINE | Admitting: EMERGENCY MEDICINE
Payer: COMMERCIAL

## 2023-06-30 VITALS
SYSTOLIC BLOOD PRESSURE: 125 MMHG | RESPIRATION RATE: 20 BRPM | TEMPERATURE: 97.4 F | HEART RATE: 72 BPM | DIASTOLIC BLOOD PRESSURE: 92 MMHG | OXYGEN SATURATION: 99 %

## 2023-06-30 DIAGNOSIS — M54.42 ACUTE LEFT-SIDED LOW BACK PAIN WITH LEFT-SIDED SCIATICA: ICD-10-CM

## 2023-06-30 LAB
ALBUMIN UR-MCNC: NEGATIVE MG/DL
ANION GAP SERPL CALCULATED.3IONS-SCNC: 10 MMOL/L (ref 7–15)
APPEARANCE UR: CLEAR
BASOPHILS # BLD AUTO: 0 10E3/UL (ref 0–0.2)
BASOPHILS NFR BLD AUTO: 1 %
BILIRUB UR QL STRIP: NEGATIVE
BUN SERPL-MCNC: 10.9 MG/DL (ref 6–20)
CALCIUM SERPL-MCNC: 10.2 MG/DL (ref 8.6–10)
CHLORIDE SERPL-SCNC: 100 MMOL/L (ref 98–107)
COLOR UR AUTO: YELLOW
CREAT SERPL-MCNC: 1.07 MG/DL (ref 0.67–1.17)
DEPRECATED HCO3 PLAS-SCNC: 27 MMOL/L (ref 22–29)
EOSINOPHIL # BLD AUTO: 0.3 10E3/UL (ref 0–0.7)
EOSINOPHIL NFR BLD AUTO: 4 %
ERYTHROCYTE [DISTWIDTH] IN BLOOD BY AUTOMATED COUNT: 12.8 % (ref 10–15)
GFR SERPL CREATININE-BSD FRML MDRD: 87 ML/MIN/1.73M2
GLUCOSE SERPL-MCNC: 153 MG/DL (ref 70–99)
GLUCOSE UR STRIP-MCNC: NEGATIVE MG/DL
HCT VFR BLD AUTO: 43.9 % (ref 40–53)
HGB BLD-MCNC: 15.2 G/DL (ref 13.3–17.7)
HGB UR QL STRIP: NEGATIVE
HYALINE CASTS: 6 /LPF
IMM GRANULOCYTES # BLD: 0 10E3/UL
IMM GRANULOCYTES NFR BLD: 0 %
KETONES UR STRIP-MCNC: NEGATIVE MG/DL
LEUKOCYTE ESTERASE UR QL STRIP: NEGATIVE
LYMPHOCYTES # BLD AUTO: 1.7 10E3/UL (ref 0.8–5.3)
LYMPHOCYTES NFR BLD AUTO: 22 %
MCH RBC QN AUTO: 30.3 PG (ref 26.5–33)
MCHC RBC AUTO-ENTMCNC: 34.6 G/DL (ref 31.5–36.5)
MCV RBC AUTO: 88 FL (ref 78–100)
MONOCYTES # BLD AUTO: 0.6 10E3/UL (ref 0–1.3)
MONOCYTES NFR BLD AUTO: 8 %
MUCOUS THREADS #/AREA URNS LPF: PRESENT /LPF
NEUTROPHILS # BLD AUTO: 5.1 10E3/UL (ref 1.6–8.3)
NEUTROPHILS NFR BLD AUTO: 65 %
NITRATE UR QL: NEGATIVE
NRBC # BLD AUTO: 0 10E3/UL
NRBC BLD AUTO-RTO: 0 /100
PH UR STRIP: 5.5 [PH] (ref 5–7)
PLATELET # BLD AUTO: 281 10E3/UL (ref 150–450)
POTASSIUM SERPL-SCNC: 4.3 MMOL/L (ref 3.4–5.3)
RBC # BLD AUTO: 5.02 10E6/UL (ref 4.4–5.9)
RBC URINE: <1 /HPF
SODIUM SERPL-SCNC: 137 MMOL/L (ref 136–145)
SP GR UR STRIP: 1.02 (ref 1–1.03)
SQUAMOUS EPITHELIAL: <1 /HPF
UROBILINOGEN UR STRIP-MCNC: NORMAL MG/DL
WBC # BLD AUTO: 7.9 10E3/UL (ref 4–11)
WBC URINE: 1 /HPF

## 2023-06-30 PROCEDURE — 85025 COMPLETE CBC W/AUTO DIFF WBC: CPT | Performed by: EMERGENCY MEDICINE

## 2023-06-30 PROCEDURE — 250N000013 HC RX MED GY IP 250 OP 250 PS 637: Performed by: EMERGENCY MEDICINE

## 2023-06-30 PROCEDURE — 99284 EMERGENCY DEPT VISIT MOD MDM: CPT

## 2023-06-30 PROCEDURE — 81001 URINALYSIS AUTO W/SCOPE: CPT | Performed by: EMERGENCY MEDICINE

## 2023-06-30 PROCEDURE — 36415 COLL VENOUS BLD VENIPUNCTURE: CPT | Performed by: EMERGENCY MEDICINE

## 2023-06-30 PROCEDURE — 80048 BASIC METABOLIC PNL TOTAL CA: CPT | Performed by: EMERGENCY MEDICINE

## 2023-06-30 RX ORDER — METHOCARBAMOL 500 MG/1
500 TABLET, FILM COATED ORAL 4 TIMES DAILY PRN
Qty: 40 TABLET | Refills: 0 | Status: SHIPPED | OUTPATIENT
Start: 2023-06-30 | End: 2023-07-27

## 2023-06-30 RX ORDER — OXYCODONE HYDROCHLORIDE 5 MG/1
5 TABLET ORAL EVERY 6 HOURS PRN
Qty: 10 TABLET | Refills: 0 | Status: SHIPPED | OUTPATIENT
Start: 2023-06-30 | End: 2023-07-03

## 2023-06-30 RX ORDER — LIDOCAINE 4 G/G
1 PATCH TOPICAL ONCE
Status: DISCONTINUED | OUTPATIENT
Start: 2023-06-30 | End: 2023-06-30 | Stop reason: HOSPADM

## 2023-06-30 RX ADMIN — LIDOCAINE PATCH 4% 1 PATCH: 40 PATCH TOPICAL at 10:55

## 2023-06-30 ASSESSMENT — ACTIVITIES OF DAILY LIVING (ADL): ADLS_ACUITY_SCORE: 35

## 2023-06-30 NOTE — ED TRIAGE NOTES
Pt arrives with a couple weeks of left lower back pain that worsened last night. Pt reports some tingling sensation into the left hip/glute. Pt reports back feels tight this morning and had a hard time getting up to the restroom. Pt has not taken anything for pain today. Pt denies any incontinence issues.     Triage Assessment     Row Name 06/30/23 1579       Triage Assessment (Adult)    Airway WDL WDL       Respiratory WDL    Respiratory WDL WDL       Skin Circulation/Temperature WDL    Skin Circulation/Temperature WDL WDL       Cardiac WDL    Cardiac WDL WDL       Peripheral/Neurovascular WDL    Peripheral Neurovascular WDL WDL       Cognitive/Neuro/Behavioral WDL    Cognitive/Neuro/Behavioral WDL WDL

## 2023-06-30 NOTE — LETTER
June 30, 2023      To Whom It May Concern:      Paco Ponce was seen in our Emergency Department today, 06/30/23.  I expect his condition to improve over the next 7 days.  He may return to work/school when improved.    Sincerely,        Tung Zapata MD

## 2023-06-30 NOTE — DISCHARGE INSTRUCTIONS
You may apply lidocaine patch for pain as needed  You have been referred for PT and MRI. They will call you to schedule  Pain meds only when  not working or driving  Follow up with your doctor on MRI results

## 2023-06-30 NOTE — ED PROVIDER NOTES
History     Chief Complaint:  Back Pain       The history is provided by the patient.      Paco Ponce is a 43 year old male with a history of hypertension who presents to the ED via private vehicle for evaluation of back pain. Patient reports onset of left sided back pain radiating to his hips and and down his left leg about a couple weeks ago. He notes that he works for an [employer] company and recently went to see a chiropractor where they provided him with exercises to manage his pain. He adds that he recently also made a trip to [place] where he did not experience any back pain. He further adds that last night, his back pain worsened after he used his foam roller to do exercises as recommended by his chiropractor. Upon examination, he complains of back pain during movements and ambulation but denies any dysuria or hematuria. He states that he has a history of kidney stones, but notes that this pain is not at all similar to the pain from the stones. He has been self-medicating at home with ibuprofen. Notes that his PCP is Dr. Longo from the [facility].    Independent Historian:   None - Patient Only    Review of External Notes:   none     Medications:    Hydrodiuril  Zestril  Revatio  Effexor-ER    Past Medical History:    Anxiety  Bowel obstruction  Diverticulitis  HTN  Kidney stone  Depression  Obesity  Left knee medial meniscus tear  ARF  Ureterolithasis    Past Surgical History:    Appendectomy  Hernia repair  ENT surgery     Physical Exam     Patient Vitals for the past 24 hrs:   BP Temp Temp src Pulse Resp SpO2   06/30/23 0849 (!) 125/92 97.4  F (36.3  C) Temporal 72 20 99 %      Physical Exam  Constitutional:       Appearance: He is well-developed.   HENT:      Mouth/Throat:      Mouth: Mucous membranes are moist.      Pharynx: Oropharynx is clear. No oropharyngeal exudate.   Eyes:      General: No scleral icterus.     Conjunctiva/sclera: Conjunctivae normal.      Pupils:  Pupils are equal, round, and reactive to light.   Cardiovascular:      Rate and Rhythm: Normal rate and regular rhythm.      Heart sounds: Normal heart sounds. No murmur heard.     No friction rub. No gallop.   Pulmonary:      Effort: Pulmonary effort is normal. No respiratory distress.      Breath sounds: Normal breath sounds. No wheezing or rales.   Abdominal:      General: Bowel sounds are normal. There is no distension.      Palpations: Abdomen is soft. There is no mass.      Tenderness: There is abdominal tenderness.      Comments: Tenderness in the lower left flank towards the midline of the L-spine.  No rash or erythema.   Musculoskeletal:         General: Tenderness present. Normal range of motion.      Comments: Mild tenderness over the left gluteal region.  Strength is 5 out of 5 in bilateral lower extremity with normal sensation.   Skin:     General: Skin is warm and dry.      Capillary Refill: Capillary refill takes less than 2 seconds.      Findings: No rash.   Neurological:      Mental Status: He is alert.      Motor: No weakness.      Gait: Gait normal.           Emergency Department Course       Laboratory:  Labs Ordered and Resulted from Time of ED Arrival to Time of ED Departure   ROUTINE UA WITH MICROSCOPIC REFLEX TO CULTURE - Abnormal       Result Value    Color Urine Yellow      Appearance Urine Clear      Glucose Urine Negative      Bilirubin Urine Negative      Ketones Urine Negative      Specific Gravity Urine 1.025      Blood Urine Negative      pH Urine 5.5      Protein Albumin Urine Negative      Urobilinogen Urine Normal      Nitrite Urine Negative      Leukocyte Esterase Urine Negative      Mucus Urine Present (*)     RBC Urine <1      WBC Urine 1      Squamous Epithelials Urine <1      Hyaline Casts Urine 6 (*)    BASIC METABOLIC PANEL - Abnormal    Sodium 137      Potassium 4.3      Chloride 100      Carbon Dioxide (CO2) 27      Anion Gap 10      Urea Nitrogen 10.9      Creatinine 1.07       Calcium 10.2 (*)     Glucose 153 (*)     GFR Estimate 87     CBC WITH PLATELETS AND DIFFERENTIAL    WBC Count 7.9      RBC Count 5.02      Hemoglobin 15.2      Hematocrit 43.9      MCV 88      MCH 30.3      MCHC 34.6      RDW 12.8      Platelet Count 281      % Neutrophils 65      % Lymphocytes 22      % Monocytes 8      % Eosinophils 4      % Basophils 1      % Immature Granulocytes 0      NRBCs per 100 WBC 0      Absolute Neutrophils 5.1      Absolute Lymphocytes 1.7      Absolute Monocytes 0.6      Absolute Eosinophils 0.3      Absolute Basophils 0.0      Absolute Immature Granulocytes 0.0      Absolute NRBCs 0.0        Emergency Department Course & Assessments:  Interventions:  Medications   Lidocaine (LIDOCARE) 4 % Patch 1 patch (1 patch Transdermal $Patch/Med Applied 6/30/23 1055)        Assessments:  1003 I obtained history and examined the patient as noted above.  1106 Rechecked and updated.    Independent Interpretation (X-rays, CTs, rhythm strip):  none    Consultations/Discussion of Management or Tests:  None    Social Determinants of Health affecting care:   None    Disposition:  The patient was discharged to home.     Impression & Plan    Medical Decision Making:  The patient presents for low back pain.  Based on hx and exam, this is consistent with musculoskeletal back pain.  There are no red flag symptoms to suggest acute neurologic emergency, acute spinal cord or nerve root compression or cauda equina syndrome.  In addition, the pt is afebrile and clinically well appearing therefore I doubt discitis, or underlying epidural abscess.  I do not suspect referred pain from an intraabdominal or urologic process.  The patient was provided with pain control with Robaxin and oxycodone for home use.  He is referred to outpatient physical therapy to help with his back pain.  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  Not able to get a quick appointment with his primary care physician.  He is  also refer for an outpatient MRI given that get into his primary doctor as soon as he would prefer.  Return precautions provided.  He is advised to come back if there is worsening pain, severe sensory concerns or any bowel bladder incontinence.  Diagnosis:    ICD-10-CM    1. Acute left-sided low back pain with left-sided sciatica  M54.42 Physical Therapy Referral     MRI Lumbar spine w/o contrast           Discharge Medications:  New Prescriptions    METHOCARBAMOL (ROBAXIN) 500 MG TABLET    Take 1 tablet (500 mg) by mouth 4 times daily as needed for muscle spasms    OXYCODONE (ROXICODONE) 5 MG TABLET    Take 1 tablet (5 mg) by mouth every 6 hours as needed for severe pain          Scribe Disclosure:  CHERI HINES, am serving as a scribe at 10:41 AM on 6/30/2023 to document services personally performed by Tung Zapata MD based on my observations and the provider's statements to me.   6/30/2023   Tung Zapata MD Cheng, Wenlan, MD  06/30/23 1420

## 2023-07-01 ENCOUNTER — TRANSFERRED RECORDS (OUTPATIENT)
Dept: MULTI SPECIALTY CLINIC | Facility: CLINIC | Age: 43
End: 2023-07-01

## 2023-07-01 LAB — RETINOPATHY: NORMAL

## 2023-07-03 ENCOUNTER — THERAPY VISIT (OUTPATIENT)
Dept: PHYSICAL THERAPY | Facility: CLINIC | Age: 43
End: 2023-07-03
Attending: EMERGENCY MEDICINE
Payer: COMMERCIAL

## 2023-07-03 ENCOUNTER — HOSPITAL ENCOUNTER (OUTPATIENT)
Dept: MRI IMAGING | Facility: CLINIC | Age: 43
Discharge: HOME | End: 2023-07-03
Attending: EMERGENCY MEDICINE | Admitting: EMERGENCY MEDICINE
Payer: COMMERCIAL

## 2023-07-03 DIAGNOSIS — M54.42 ACUTE LEFT-SIDED LOW BACK PAIN WITH LEFT-SIDED SCIATICA: ICD-10-CM

## 2023-07-03 DIAGNOSIS — M54.16 LEFT LUMBAR RADICULOPATHY: ICD-10-CM

## 2023-07-03 PROCEDURE — 97110 THERAPEUTIC EXERCISES: CPT | Mod: GP | Performed by: PHYSICAL THERAPIST

## 2023-07-03 PROCEDURE — 72148 MRI LUMBAR SPINE W/O DYE: CPT

## 2023-07-03 PROCEDURE — 97161 PT EVAL LOW COMPLEX 20 MIN: CPT | Mod: GP | Performed by: PHYSICAL THERAPIST

## 2023-07-03 NOTE — PROGRESS NOTES
PHYSICAL THERAPY EVALUATION  Type of Visit: Evaluation    See electronic medical record for Abuse and Falls Screening details.    Subjective      Presenting condition or subjective complaint: Left low back and left leg  Date of onset:      Relevant medical history: Depression; Fibromyalgia; High blood pressure; Migraines or headaches; Severe headaches   Dates & types of surgery:      Prior diagnostic imaging/testing results:       Prior therapy history for the same diagnosis, illness or injury: Yes chiropractor      Living Environment  Social support: Alone   Type of home: Apartment/condo   Stairs to enter the home: No       Ramp: No   Stairs inside the home: No       Help at home: None  Equipment owned:       Employment: Yes HVAC- lift, bend  Hobbies/Interests: golf, outdoors    Patient goals for therapy: bending and lifting    Pain assessment: Pain present  Location: left low back /Ratin/10      Objective     LUMBAR:    Posture: no change with lumbar support    Gait: very slow, antalgic     Neurological:    Motor Deficit:  Myotomes L R   L1-2 (hip flexion) 4/5 5/5   L3 (knee extension) 4/5 5/5   L4 (ankle DF) 4/5 5/5   L5 (g. toe ext) 4/5 5/5   S1 (ankle PF or knee flex) 4/5 5/5     Sensory Deficit, Reflexes: symmetrical and intact bilaterally     Dural Signs:   L R   Slump + -       AROM: (Major, Moderate, Minimal or Nil loss)  Movement Loss Holland Mod Min Nil Pain   Flexion X    Pain low back   Extension  X   Pain left sided   Side Gliding L  X   Pain on left   Side Gliding R  X   No pain       Lumbar Mobility/Spring Testing:     Prone lying: centralizing out of leg  TOÑO: centralized to L low back and into center of low back  Press ups: X10           Assessment & Plan   CLINICAL IMPRESSIONS   Medical Diagnosis: L lumbar radiculopathy    Treatment Diagnosis: L lumbar radiculopathy   Impression/Assessment: Patient is a 43 year old male with lumbar complaints.  The following significant findings have been  identified: Pain, Decreased ROM/flexibility, Decreased joint mobility, Decreased strength, Inflammation, Edema, Impaired gait, Impaired muscle performance, Decreased activity tolerance and Impaired posture. These impairments interfere with their ability to perform self care tasks, work tasks, recreational activities, household chores and community mobility as compared to previous level of function.     Clinical Decision Making (Complexity):   Clinical Presentation: Stable/Uncomplicated  Clinical Presentation Rationale: based on medical and personal factors listed in PT evaluation  Clinical Decision Making (Complexity): Low complexity    PLAN OF CARE  Treatment Interventions:  Interventions: Gait Training, Manual Therapy, Neuromuscular Re-education, Therapeutic Activity, Therapeutic Exercise    Long Term Goals     PT Goal 1  Goal Identifier: Goal 1  Goal Description: Pt will be able to sit for 30 min, painfree  Rationale: to maximize safety and independence with performance of ADLs and functional tasks  PT Goal 2  Goal Identifier: Goal 2  Goal Description: Pt will be able to sleep throughout the night without waking due to pain  Rationale: to maximize safety and independence with performance of ADLs and functional tasks      Frequency of Treatment: 1X/week  Duration of Treatment: 12 weeks      Education Assessment:   Learner/Method: Patient;Pictures/Video    Risks and benefits of evaluation/treatment have been explained.   Patient/Family/caregiver agrees with Plan of Care.     Evaluation Time:     PT Eval, Low Complexity Minutes (03466): 15      Signing Clinician: Morena Hernandez PT

## 2023-07-05 ENCOUNTER — TELEPHONE (OUTPATIENT)
Dept: FAMILY MEDICINE | Facility: CLINIC | Age: 43
End: 2023-07-05
Payer: COMMERCIAL

## 2023-07-05 NOTE — LETTER
July 6, 2023      Paco LOTT Kris  [address]  [id number]  [facility] [address]        To Whom It May Concern:      Paco has been dealing with acute lumbar radiculopathy. He has been seeing physical therapy and finding improvement with this treatment. However, to help his symptoms fully resolve with exacerbation, recommend he stay out of work until 7/14/2023. Please contact our clinic if you have any questions or concerns.        Sincerely,        Douglas Longo, DO

## 2023-07-05 NOTE — TELEPHONE ENCOUNTER
Patient calling.    1.  Was in the ED 6/30/23 for back pain.  MRI done 7/3/23.  Patient is asking for results and what his next step is.    2.  Needs new letter excusing him from work.  Current note from ED is excusing him from work thru 7/6/23.  Had a physical therapy appointment 7/3/23. Patient reports his physical therapist is recommending he be out of work thru next week.  Patient will  letter once done.  Or does patient need a follow up appointment?  If so, when to work in?    Please advise, thanks.

## 2023-07-06 ENCOUNTER — TELEPHONE (OUTPATIENT)
Dept: FAMILY MEDICINE | Facility: CLINIC | Age: 43
End: 2023-07-06
Payer: COMMERCIAL

## 2023-07-06 NOTE — TELEPHONE ENCOUNTER
Patient would like a nurse to call them about their test results. It is ok to leave a detailed message on their number.

## 2023-07-06 NOTE — TELEPHONE ENCOUNTER
Paco called back. See below message.    Lit Casey RN [facility]

## 2023-07-07 NOTE — TELEPHONE ENCOUNTER
Called # [phone]   Advised patient of results. He reports understanding and agrees with plan..    Lit Casey RN  [facility]

## 2023-07-07 NOTE — TELEPHONE ENCOUNTER
MRI lumbar spine ordered in ED demonstrated some mild degenerative changes but no significant neural foraminal stenosis (narrowing where the nerves leave the spinal cord). It also showed chronic vertebral body compression deformities at L3, L4, and L5. Recommend completing course of physical therapy. If symptoms not improving or if they are worsening, I would have him seen in the spine clinic.    Douglas Longo DO  7/7/2023 12:04 AM

## 2023-07-10 ENCOUNTER — THERAPY VISIT (OUTPATIENT)
Dept: PHYSICAL THERAPY | Facility: CLINIC | Age: 43
End: 2023-07-10
Payer: COMMERCIAL

## 2023-07-10 ENCOUNTER — MYC MEDICAL ADVICE (OUTPATIENT)
Dept: FAMILY MEDICINE | Facility: CLINIC | Age: 43
End: 2023-07-10

## 2023-07-10 DIAGNOSIS — M54.16 LEFT LUMBAR RADICULOPATHY: Primary | ICD-10-CM

## 2023-07-10 PROCEDURE — 97110 THERAPEUTIC EXERCISES: CPT | Mod: GP | Performed by: PHYSICAL THERAPIST

## 2023-07-11 NOTE — TELEPHONE ENCOUNTER
Please see my chart message below     Please review and advise     Thank you     Rachell Callahan RN, BSN  [facility]

## 2023-07-12 ENCOUNTER — TELEPHONE (OUTPATIENT)
Dept: FAMILY MEDICINE | Facility: CLINIC | Age: 43
End: 2023-07-12
Payer: COMMERCIAL

## 2023-07-12 NOTE — TELEPHONE ENCOUNTER
Patient would like to receive a note to go back to work by Friday; wants to go back to work on Monday.

## 2023-07-12 NOTE — TELEPHONE ENCOUNTER
Letter written. If he needs signed copy, can print and have it waiting for him at the .    Douglas Longo,   7/12/2023 2:55 PM

## 2023-07-12 NOTE — TELEPHONE ENCOUNTER
Called #   Telephone Information:   Mobile [phone]     Advised pt on the information below - asked it be walked up to  - done     Patient stated an understanding and agreed with plan.      Rachell Callahan RN, BSN  [facility] [place]

## 2023-07-12 NOTE — TELEPHONE ENCOUNTER
Forms/Letter Request    Type of form/letter: drop off form    Have you been seen for this request: N/A    Do we have the form/letter: Yes: Placed in Dr. Longo's bin    Who is the form from? Patient    Where did/will the form come from? Patient or family brought in       When is form/letter needed by: 7/14/23    How would you like the form/letter returned:     Patient Notified form requests are processed in 3-5 business days:Yes    Could we send this information to you in [employer] or would you prefer to receive a phone call?:   Patient would prefer a phone call at [phone]

## 2023-07-13 ENCOUNTER — THERAPY VISIT (OUTPATIENT)
Dept: PHYSICAL THERAPY | Facility: CLINIC | Age: 43
End: 2023-07-13
Payer: COMMERCIAL

## 2023-07-13 DIAGNOSIS — M54.16 LEFT LUMBAR RADICULOPATHY: Primary | ICD-10-CM

## 2023-07-13 PROCEDURE — 97110 THERAPEUTIC EXERCISES: CPT | Mod: GP | Performed by: PHYSICAL THERAPIST

## 2023-07-19 NOTE — TELEPHONE ENCOUNTER
Patient's form signed, dated, and placed in TC basket.    Douglas Longo DO  7/19/2023 12:48 PM

## 2023-07-27 ENCOUNTER — OFFICE VISIT (OUTPATIENT)
Dept: FAMILY MEDICINE | Facility: CLINIC | Age: 43
End: 2023-07-27
Payer: COMMERCIAL

## 2023-07-27 VITALS
HEIGHT: 73 IN | WEIGHT: 247 LBS | BODY MASS INDEX: 32.74 KG/M2 | RESPIRATION RATE: 18 BRPM | OXYGEN SATURATION: 98 % | SYSTOLIC BLOOD PRESSURE: 126 MMHG | TEMPERATURE: 96 F | DIASTOLIC BLOOD PRESSURE: 80 MMHG | HEART RATE: 100 BPM

## 2023-07-27 DIAGNOSIS — E66.01 MORBID OBESITY (H): ICD-10-CM

## 2023-07-27 DIAGNOSIS — Z00.00 ROUTINE GENERAL MEDICAL EXAMINATION AT A HEALTH CARE FACILITY: Primary | ICD-10-CM

## 2023-07-27 DIAGNOSIS — I10 HYPERTENSION GOAL BP (BLOOD PRESSURE) < 140/90: ICD-10-CM

## 2023-07-27 DIAGNOSIS — E11.9 TYPE 2 DIABETES MELLITUS WITHOUT COMPLICATION, WITHOUT LONG-TERM CURRENT USE OF INSULIN (H): ICD-10-CM

## 2023-07-27 DIAGNOSIS — Z12.11 SCREEN FOR COLON CANCER: ICD-10-CM

## 2023-07-27 PROCEDURE — 99396 PREV VISIT EST AGE 40-64: CPT | Mod: 25 | Performed by: FAMILY MEDICINE

## 2023-07-27 PROCEDURE — 90471 IMMUNIZATION ADMIN: CPT | Performed by: FAMILY MEDICINE

## 2023-07-27 PROCEDURE — 99207 PR FOOT EXAM NO CHARGE: CPT | Mod: 25 | Performed by: FAMILY MEDICINE

## 2023-07-27 PROCEDURE — 90715 TDAP VACCINE 7 YRS/> IM: CPT | Performed by: FAMILY MEDICINE

## 2023-07-27 PROCEDURE — 99214 OFFICE O/P EST MOD 30 MIN: CPT | Mod: 25 | Performed by: FAMILY MEDICINE

## 2023-07-27 RX ORDER — LISINOPRIL 10 MG/1
10 TABLET ORAL DAILY
Qty: 90 TABLET | Refills: 3 | Status: SHIPPED | OUTPATIENT
Start: 2023-07-27 | End: 2024-08-12

## 2023-07-27 RX ORDER — HYDROCHLOROTHIAZIDE 25 MG/1
25 TABLET ORAL DAILY
Qty: 90 TABLET | Refills: 3 | Status: SHIPPED | OUTPATIENT
Start: 2023-07-27 | End: 2024-08-12

## 2023-07-27 RX ORDER — METFORMIN HCL 500 MG
1000 TABLET, EXTENDED RELEASE 24 HR ORAL 2 TIMES DAILY WITH MEALS
Qty: 360 TABLET | Refills: 1 | Status: SHIPPED | OUTPATIENT
Start: 2023-07-27 | End: 2023-10-05

## 2023-07-27 RX ORDER — LANCETS
EACH MISCELLANEOUS
Qty: 200 EACH | Refills: 6 | Status: SHIPPED | OUTPATIENT
Start: 2023-07-27

## 2023-07-27 ASSESSMENT — ENCOUNTER SYMPTOMS
DIARRHEA: 0
FEVER: 0
HEARTBURN: 0
FREQUENCY: 0
HEMATURIA: 0
CHILLS: 0
CONSTIPATION: 0
ARTHRALGIAS: 0
PALPITATIONS: 0
WEAKNESS: 0
ABDOMINAL PAIN: 0
DIZZINESS: 0
EYE PAIN: 0
PARESTHESIAS: 0
COUGH: 0
HEMATOCHEZIA: 0
NERVOUS/ANXIOUS: 1
MYALGIAS: 0
DYSURIA: 0
SHORTNESS OF BREATH: 0
SORE THROAT: 0
HEADACHES: 0
NAUSEA: 0
JOINT SWELLING: 0

## 2023-07-27 ASSESSMENT — ANXIETY QUESTIONNAIRES
3. WORRYING TOO MUCH ABOUT DIFFERENT THINGS: SEVERAL DAYS
GAD7 TOTAL SCORE: 5
4. TROUBLE RELAXING: SEVERAL DAYS
2. NOT BEING ABLE TO STOP OR CONTROL WORRYING: SEVERAL DAYS
7. FEELING AFRAID AS IF SOMETHING AWFUL MIGHT HAPPEN: NOT AT ALL
6. BECOMING EASILY ANNOYED OR IRRITABLE: SEVERAL DAYS
5. BEING SO RESTLESS THAT IT IS HARD TO SIT STILL: NOT AT ALL
1. FEELING NERVOUS, ANXIOUS, OR ON EDGE: SEVERAL DAYS
GAD7 TOTAL SCORE: 5
IF YOU CHECKED OFF ANY PROBLEMS ON THIS QUESTIONNAIRE, HOW DIFFICULT HAVE THESE PROBLEMS MADE IT FOR YOU TO DO YOUR WORK, TAKE CARE OF THINGS AT HOME, OR GET ALONG WITH OTHER PEOPLE: SOMEWHAT DIFFICULT

## 2023-07-27 ASSESSMENT — PATIENT HEALTH QUESTIONNAIRE - PHQ9
SUM OF ALL RESPONSES TO PHQ QUESTIONS 1-9: 3
10. IF YOU CHECKED OFF ANY PROBLEMS, HOW DIFFICULT HAVE THESE PROBLEMS MADE IT FOR YOU TO DO YOUR WORK, TAKE CARE OF THINGS AT HOME, OR GET ALONG WITH OTHER PEOPLE: SOMEWHAT DIFFICULT
SUM OF ALL RESPONSES TO PHQ QUESTIONS 1-9: 3

## 2023-07-27 NOTE — PROGRESS NOTES
SUBJECTIVE:   CC: Paco is an 43 year old who presents for preventative health visit.       7/27/2023     1:57 PM   Additional Questions   Roomed by Mikki CMA   Accompanied by Self       Healthy Habits:     Getting at least 3 servings of Calcium per day:  Yes    Bi-annual eye exam:  NO    Dental care twice a year:  NO    Sleep apnea or symptoms of sleep apnea:  None    Diet:  Regular (no restrictions)    Frequency of exercise:  2-3 days/week    Duration of exercise:  15-30 minutes    Taking medications regularly:  Yes    Medication side effects:  Other    Additional concerns today:  No      Today's PHQ-9 Score:       7/27/2023     1:16 PM   PHQ-9 SCORE   PHQ-9 Total Score MyChart 3 (Minimal depression)   PHQ-9 Total Score 3         Hypertension Follow-up    Do you check your blood pressure regularly outside of the clinic? No   Are you following a low salt diet? No  Are your blood pressures ever more than 140 on the top number (systolic) OR more   than 90 on the bottom number (diastolic), for example 140/90? No    Denies any headaches, lightheadedness, dizziness, vision changes, chest pain, palpitations, shortness of breath, dyspnea, numbness/tingling.      Depression and Anxiety Follow-Up  How are you doing with your depression since your last visit? No change  How are you doing with your anxiety since your last visit?  No change  Are you having other symptoms that might be associated with depression or anxiety? No  Have you had a significant life event? No   Do you have any concerns with your use of alcohol or other drugs? No      Going through divorce. Not taking Effexor. Mood has actually been well controlled.    Social History     Tobacco Use    Smoking status: Never    Smokeless tobacco: Never   Substance Use Topics    Alcohol use: Yes     Alcohol/week: 0.0 standard drinks of alcohol     Comment: per wk 1-2    Drug use: No         2/10/2022     7:02 AM 4/14/2022     7:52 AM 7/27/2023     1:16 PM   PHQ   PHQ-9  Total Score 2 2 3   Q9: Thoughts of better off dead/self-harm past 2 weeks Not at all Not at all Not at all         2/10/2022     7:02 AM 4/14/2022     7:53 AM 7/27/2023     1:16 PM   MAAME-7 SCORE   Total Score  0 (minimal anxiety) 5 (mild anxiety)   Total Score 4 0 5         7/27/2023     1:16 PM   Last PHQ-9   1.  Little interest or pleasure in doing things 0   2.  Feeling down, depressed, or hopeless 1   3.  Trouble falling or staying asleep, or sleeping too much 0   4.  Feeling tired or having little energy 0   5.  Poor appetite or overeating 0   6.  Feeling bad about yourself 1   7.  Trouble concentrating 1   8.  Moving slowly or restless 0   Q9: Thoughts of better off dead/self-harm past 2 weeks 0   PHQ-9 Total Score 3         Social History     Tobacco Use    Smoking status: Never    Smokeless tobacco: Never   Substance Use Topics    Alcohol use: Yes     Alcohol/week: 0.0 standard drinks of alcohol     Comment: per wk 1-2             7/27/2023     1:20 PM   Alcohol Use   Prescreen: >3 drinks/day or >7 drinks/week? No          No data to display                Last PSA: No results found for: PSA    Reviewed orders with patient. Reviewed health maintenance and updated orders accordingly - Yes  Lab work is in process    Reviewed and updated as needed this visit by clinical staff   Tobacco  Allergies  Meds              Reviewed and updated as needed this visit by Provider                   Review of Systems   Constitutional:  Negative for chills and fever.   HENT:  Negative for congestion, ear pain, hearing loss and sore throat.    Eyes:  Positive for visual disturbance. Negative for pain.   Respiratory:  Negative for cough and shortness of breath.    Cardiovascular:  Negative for chest pain, palpitations and peripheral edema.   Gastrointestinal:  Negative for abdominal pain, constipation, diarrhea, heartburn, hematochezia and nausea.   Genitourinary:  Negative for dysuria, frequency, genital sores, hematuria,  "impotence, penile discharge and urgency.   Musculoskeletal:  Negative for arthralgias, joint swelling and myalgias.   Skin:  Negative for rash.   Neurological:  Negative for dizziness, weakness, headaches and paresthesias.   Psychiatric/Behavioral:  Negative for mood changes. The patient is nervous/anxious.        OBJECTIVE:   /80   Pulse 100   Temp (!) 96  F (35.6  C) (Tympanic)   Resp 18   Ht 1.854 m (6' 1\")   Wt 112 kg (247 lb)   SpO2 98%   BMI 32.59 kg/m      Physical Exam  GENERAL: healthy, alert and no distress  EYES: Eyes grossly normal to inspection  HENT: ear canals and TM's normal, nose and mouth without ulcers or lesions  NECK: no adenopathy, no asymmetry, masses, or scars  RESP: lungs clear to auscultation - no rales, rhonchi or wheezes  CV: regular rates and rhythm, normal S1 S2, no S3 or S4, and no murmur, click or rub  MS: no gross musculoskeletal defects noted, no edema  Diabetic foot exam: normal DP and PT pulses, no trophic changes or ulcerative lesions, normal sensory exam, and normal monofilament exam    Diagnostic Test Results:  Labs reviewed in Epic    ASSESSMENT/PLAN:   1. Routine general medical examination at a health care facility  Health maintenance reviewed and updated.   2. Screen for colon cancer  - Colonoscopy Screening  Referral; Future    3. Morbid obesity (H)  Emphasized importance of balanced diet and regular exercise.      4. Hypertension goal BP (blood pressure) < 140/90  Continue hydrochlorothiazide, lisinopril.  - hydrochlorothiazide (HYDRODIURIL) 25 MG tablet; Take 1 tablet (25 mg) by mouth daily  Dispense: 90 tablet; Refill: 3  - lisinopril (ZESTRIL) 10 MG tablet; Take 1 tablet (10 mg) by mouth daily  Dispense: 90 tablet; Refill: 3    5. Type 2 diabetes mellitus without complication, without long-term current use of insulin (H)  Stable, Recheck A1c. Continue current medications. Continue checking blood sugars at least twice daily. Discussed goals - " fasting <130, 2 hrs post meal 130-180. Adjust regimen if needed.  - Hemoglobin A1c; Future  - Lipid panel reflex to direct LDL Fasting; Future  - Comprehensive metabolic panel (BMP + Alb, Alk Phos, ALT, AST, Total. Bili, TP); Future  - FOOT EXAM  - metFORMIN (GLUCOPHAGE XR) 500 MG 24 hr tablet; Take 2 tablets (1,000 mg) by mouth 2 times daily (with meals)  Dispense: 360 tablet; Refill: 1  - blood glucose monitoring (NO BRAND SPECIFIED) meter device kit; Use to test blood sugar 2 times daily or as directed. Preferred blood glucose meter OR supplies to accompany: Blood Glucose Monitor Brands: per insurance.  Dispense: 1 kit; Refill: 0  - blood glucose (NO BRAND SPECIFIED) test strip; Use to test blood sugar 2 times daily or as directed. To accompany: Blood Glucose Monitor Brands: per insurance.  Dispense: 100 strip; Refill: 6  - thin (NO BRAND SPECIFIED) lancets; Use with lanceting device. To accompany: Blood Glucose Monitor Brands: per insurance.  Dispense: 200 each; Refill: 6  - Albumin Random Urine Quantitative with Creat Ratio; Future      Patient has been advised of split billing requirements and indicates understanding: Yes      COUNSELING:   Reviewed preventive health counseling, as reflected in patient instructions        He reports that he has never smoked. He has never used smokeless tobacco.            Douglas Longo [facility]KE[facility]swers submitted by the patient for this visit:  Patient Health Questionnaire (Submitted on 7/27/2023)  If you checked off any problems, how difficult have these problems made it for you to do your work, take care of things at home, or get along with other people?: Somewhat difficult  PHQ9 TOTAL SCORE: 3  MAAME-7 (Submitted on 7/27/2023)  MAAME 7 TOTAL SCORE: 5

## 2023-07-28 ENCOUNTER — THERAPY VISIT (OUTPATIENT)
Dept: PHYSICAL THERAPY | Facility: CLINIC | Age: 43
End: 2023-07-28
Payer: COMMERCIAL

## 2023-07-28 DIAGNOSIS — M54.16 LEFT LUMBAR RADICULOPATHY: Primary | ICD-10-CM

## 2023-07-28 PROCEDURE — 97530 THERAPEUTIC ACTIVITIES: CPT | Mod: GP | Performed by: PHYSICAL THERAPIST

## 2023-07-28 PROCEDURE — 97110 THERAPEUTIC EXERCISES: CPT | Mod: 59 | Performed by: PHYSICAL THERAPIST

## 2023-08-10 ENCOUNTER — LAB (OUTPATIENT)
Dept: LAB | Facility: CLINIC | Age: 43
End: 2023-08-10
Payer: COMMERCIAL

## 2023-08-10 DIAGNOSIS — E11.9 TYPE 2 DIABETES MELLITUS WITHOUT COMPLICATION, WITHOUT LONG-TERM CURRENT USE OF INSULIN (H): ICD-10-CM

## 2023-08-10 LAB
ALBUMIN SERPL BCG-MCNC: 4.6 G/DL (ref 3.5–5.2)
ALP SERPL-CCNC: 77 U/L (ref 40–129)
ALT SERPL W P-5'-P-CCNC: 18 U/L (ref 0–70)
ANION GAP SERPL CALCULATED.3IONS-SCNC: 11 MMOL/L (ref 7–15)
AST SERPL W P-5'-P-CCNC: 28 U/L (ref 0–45)
BILIRUB SERPL-MCNC: 0.6 MG/DL
BUN SERPL-MCNC: 14.2 MG/DL (ref 6–20)
CALCIUM SERPL-MCNC: 9.5 MG/DL (ref 8.6–10)
CHLORIDE SERPL-SCNC: 100 MMOL/L (ref 98–107)
CHOLEST SERPL-MCNC: 171 MG/DL
CREAT SERPL-MCNC: 1.14 MG/DL (ref 0.67–1.17)
CREAT UR-MCNC: 355 MG/DL
DEPRECATED HCO3 PLAS-SCNC: 27 MMOL/L (ref 22–29)
GFR SERPL CREATININE-BSD FRML MDRD: 81 ML/MIN/1.73M2
GLUCOSE SERPL-MCNC: 117 MG/DL (ref 70–99)
HBA1C MFR BLD: 7 % (ref 0–5.6)
HDLC SERPL-MCNC: 26 MG/DL
LDLC SERPL CALC-MCNC: 87 MG/DL
MICROALBUMIN UR-MCNC: 17.4 MG/L
MICROALBUMIN/CREAT UR: 4.9 MG/G CR (ref 0–17)
NONHDLC SERPL-MCNC: 145 MG/DL
POTASSIUM SERPL-SCNC: 4.4 MMOL/L (ref 3.4–5.3)
PROT SERPL-MCNC: 7.2 G/DL (ref 6.4–8.3)
SODIUM SERPL-SCNC: 138 MMOL/L (ref 136–145)
TRIGL SERPL-MCNC: 288 MG/DL

## 2023-08-10 PROCEDURE — 82570 ASSAY OF URINE CREATININE: CPT

## 2023-08-10 PROCEDURE — 36415 COLL VENOUS BLD VENIPUNCTURE: CPT

## 2023-08-10 PROCEDURE — 83036 HEMOGLOBIN GLYCOSYLATED A1C: CPT

## 2023-08-10 PROCEDURE — 80053 COMPREHEN METABOLIC PANEL: CPT

## 2023-08-10 PROCEDURE — 82043 UR ALBUMIN QUANTITATIVE: CPT

## 2023-08-10 PROCEDURE — 80061 LIPID PANEL: CPT

## 2023-08-19 ENCOUNTER — MYC MEDICAL ADVICE (OUTPATIENT)
Dept: FAMILY MEDICINE | Facility: CLINIC | Age: 43
End: 2023-08-19
Payer: COMMERCIAL

## 2023-09-01 NOTE — TELEPHONE ENCOUNTER
He can set up appointment for toenail removal -- please help schedule 60 minute appointment.    Douglas Longo DO  9/1/2023 10:27 AM

## 2023-10-05 ENCOUNTER — OFFICE VISIT (OUTPATIENT)
Dept: FAMILY MEDICINE | Facility: CLINIC | Age: 43
End: 2023-10-05
Payer: COMMERCIAL

## 2023-10-05 VITALS
RESPIRATION RATE: 18 BRPM | WEIGHT: 240 LBS | DIASTOLIC BLOOD PRESSURE: 82 MMHG | BODY MASS INDEX: 31.81 KG/M2 | HEIGHT: 73 IN | HEART RATE: 98 BPM | SYSTOLIC BLOOD PRESSURE: 124 MMHG | TEMPERATURE: 97.5 F | OXYGEN SATURATION: 100 %

## 2023-10-05 DIAGNOSIS — E11.65 TYPE 2 DIABETES MELLITUS WITH HYPERGLYCEMIA, WITHOUT LONG-TERM CURRENT USE OF INSULIN (H): ICD-10-CM

## 2023-10-05 DIAGNOSIS — L60.0 INGROWN TOENAIL OF BOTH FEET: Primary | ICD-10-CM

## 2023-10-05 DIAGNOSIS — F41.9 ANXIETY: ICD-10-CM

## 2023-10-05 PROCEDURE — 11732 AVLSN NAIL PLATE SIMPLE EACH: CPT | Performed by: FAMILY MEDICINE

## 2023-10-05 PROCEDURE — 99214 OFFICE O/P EST MOD 30 MIN: CPT | Mod: 25 | Performed by: FAMILY MEDICINE

## 2023-10-05 PROCEDURE — 11730 AVULSION NAIL PLATE SIMPLE 1: CPT | Performed by: FAMILY MEDICINE

## 2023-10-05 PROCEDURE — 96127 BRIEF EMOTIONAL/BEHAV ASSMT: CPT | Performed by: FAMILY MEDICINE

## 2023-10-05 RX ORDER — VENLAFAXINE HYDROCHLORIDE 75 MG/1
75 CAPSULE, EXTENDED RELEASE ORAL DAILY
Qty: 90 CAPSULE | Refills: 0 | Status: SHIPPED | OUTPATIENT
Start: 2023-10-05 | End: 2024-01-02

## 2023-10-05 RX ORDER — GLIPIZIDE 5 MG/1
5 TABLET, FILM COATED, EXTENDED RELEASE ORAL DAILY
Qty: 90 TABLET | Refills: 1 | Status: SHIPPED | OUTPATIENT
Start: 2023-10-05 | End: 2024-02-20

## 2023-10-05 ASSESSMENT — ANXIETY QUESTIONNAIRES
GAD7 TOTAL SCORE: 16
GAD7 TOTAL SCORE: 16
2. NOT BEING ABLE TO STOP OR CONTROL WORRYING: NEARLY EVERY DAY
6. BECOMING EASILY ANNOYED OR IRRITABLE: NEARLY EVERY DAY
7. FEELING AFRAID AS IF SOMETHING AWFUL MIGHT HAPPEN: MORE THAN HALF THE DAYS
3. WORRYING TOO MUCH ABOUT DIFFERENT THINGS: NEARLY EVERY DAY
1. FEELING NERVOUS, ANXIOUS, OR ON EDGE: NEARLY EVERY DAY
IF YOU CHECKED OFF ANY PROBLEMS ON THIS QUESTIONNAIRE, HOW DIFFICULT HAVE THESE PROBLEMS MADE IT FOR YOU TO DO YOUR WORK, TAKE CARE OF THINGS AT HOME, OR GET ALONG WITH OTHER PEOPLE: VERY DIFFICULT
5. BEING SO RESTLESS THAT IT IS HARD TO SIT STILL: NOT AT ALL

## 2023-10-05 ASSESSMENT — PATIENT HEALTH QUESTIONNAIRE - PHQ9
5. POOR APPETITE OR OVEREATING: MORE THAN HALF THE DAYS
SUM OF ALL RESPONSES TO PHQ QUESTIONS 1-9: 7

## 2023-10-05 NOTE — PROGRESS NOTES
"  Assessment & Plan     Ingrown toenail of both feet  After verbal consent, the right and left big toes were each anesthetized with 5cc's of 2% plain lidocaine. A tourniquet was applied to the right toe. The right nail raised from the nail bed and the entire nail was removed.  Phenol was then applied to the nail bed and nail matrix.  The area was then flushed with copious amounts of alcohol.  Bacitracin was applied to the nail bed.  The tourniquet was removed.  A tourniquet was then applied to the left big toe. The left nail raised from the nail bed and the entire nail was removed.  Phenol was then applied to the nail bed and nail matrix.  The area was then flushed with copious amounts of alcohol.  Bacitracin was applied to the nail bed.  The tourniquet was removed. Bandage was applied to both toes. The patient tolerated the procedure and anesthesia well.      Type 2 diabetes mellitus with hyperglycemia, without long-term current use of insulin (H)  Fair control; discontinue metformin due to diarrhea. Will start glipizide 5 mg daily. Jaclyn for hypoglycemia, follow up in 3 months to recheck a1c  - glipiZIDE (GLUCOTROL XL) 5 MG 24 hr tablet; Take 1 tablet (5 mg) by mouth daily    Anxiety  Restart venlafaxine but lower dose at 75 mg daily. He'll let me know how he is doing in a month and we can adjust the dose as needed.  - venlafaxine (EFFEXOR XR) 75 MG 24 hr capsule; Take 1 capsule (75 mg) by mouth daily             BMI:   Estimated body mass index is 31.66 kg/m  as calculated from the following:    Height as of this encounter: 1.854 m (6' 1\").    Weight as of this encounter: 108.9 kg (240 lb).         DO JESSY Barfield [facility] [place] Antonio is a 43 year old, presenting for the following health issues:  toe nail removal        10/5/2023     6:53 AM   Additional Questions   Roomed by Angie GREER       History of Present Illness       Reason for visit:  Ingrown toenail issues    He " eats 0-1 servings of fruits and vegetables daily.He consumes 3 sweetened beverage(s) daily.He exercises with enough effort to increase his heart rate 10 to 19 minutes per day.  He exercises with enough effort to increase his heart rate 3 or less days per week.   He is taking medications regularly.     Years of issues with ingrown toenails. Would like them removed permanently.        Diabetes Follow-up- Metformin side effects- diarrhea     How often are you checking your blood sugar? Not at all  What concerns do you have today about your diabetes? Other: medication concerns    Do you have any of these symptoms? (Select all that apply)  No numbness or tingling in feet.  No redness, sores or blisters on feet.  No complaints of excessive thirst.  No reports of blurry vision.  No significant changes to weight.  Have you had a diabetic eye exam in the last 12 months? Yes- Date of last eye exam: July 2023,  Location: [facility]    Working on eating less, ctting back on pop - down to 1-2 per day. Metformin causing diarrhea.       BP Readings from Last 2 Encounters:   10/05/23 124/82   07/27/23 126/80     Hemoglobin A1C (%)   Date Value   08/10/2023 7.0 (H)   04/14/2022 7.0 (H)     LDL Cholesterol Calculated (mg/dL)   Date Value   08/10/2023 87   05/22/2019 119 (H)   02/29/2016 106 (H)           Anxiety Follow-Up  How are you doing with your anxiety since your last visit? When he was on his anxiety medication in the past he felt numb- its getting worse the anxiety.  Are you having other symptoms that might be associated with anxiety? Yes:  irritable, can't focus, things bug him alot more   Have you had a significant life event? Relationship Concerns going through a divorce   Are you feeling depressed? No  Do you have any concerns with your use of alcohol or other drugs? No  He had previously stopped venlafaxine which was working well; maybe too well -- felt numb    Social History     Tobacco Use    Smoking status: Never  "   Smokeless tobacco: Never   Substance Use Topics    Alcohol use: Yes     Alcohol/week: 0.0 standard drinks of alcohol     Comment: per wk 1-2    Drug use: No         4/14/2022     7:53 AM 7/27/2023     1:16 PM 10/5/2023     9:10 AM   MAAME-7 SCORE   Total Score 0 (minimal anxiety) 5 (mild anxiety)    Total Score 0 5 16         4/14/2022     7:52 AM 7/27/2023     1:16 PM 10/5/2023     9:10 AM   PHQ   PHQ-9 Total Score 2 3 7   Q9: Thoughts of better off dead/self-harm past 2 weeks Not at all Not at all Not at all           Review of Systems   Constitutional, HEENT, cardiovascular, pulmonary, gi and gu systems are negative, except as otherwise noted.      Objective    /82   Pulse 98   Temp 97.5  F (36.4  C)   Resp 18   Ht 1.854 m (6' 1\")   Wt 108.9 kg (240 lb)   SpO2 100%   BMI 31.66 kg/m    Body mass index is 31.66 kg/m .  Physical Exam   GENERAL: healthy, alert and no distress  MS: extremities normal- no gross deformities noted; bilateral big toenails ingrown with swelling and erythema around nail border  PSYCH: mentation appears normal, affect normal/bright                  "

## 2023-10-05 NOTE — PATIENT INSTRUCTIONS
INGROWN TOENAILS  When a toenail is ingrown, it is curved and grows into the skin, usually at the nail borders (the sides of the nail). This  digging in  of the nail irritates the skin, often creating pain, redness, swelling, and warmth in the toe.  If an ingrown nail causes a break in the skin, bacteria may enter and cause an infection in the area, which is often marked by drainage and a foul odor. However, even if the toe isn t painful, red, swollen, or warm, a nail that curves downward into the skin can progress to an infection.  CAUSES:  Heredity: In many people, the tendency for ingrown toenails is inherited.   Trauma: Sometimes an ingrown toenail is the result of trauma, such as stubbing your toe, having an object fall on your toe, or engaging in activities that involve repeated pressure on the toes, such as kicking or running.   Improper Trimming:  The most common cause of ingrown toenails is cutting your nails too short. This encourages the skin next to the nail to fold over the nail.   Improperly Sized Footwear: Ingrown toenails can result from wearing socks and shoes that are tight or short.   Nail Conditions: Ingrown toenails can be caused by nail problems, such as fungal infections or losing a nail due to trauma.   TREATMENT: Sometimes initial treatment for ingrown toenails can be safely performed at home. However, home treatment is strongly discouraged if an infection is suspected, or for those who have medical conditions that put feet at high risk, such as diabetes, nerve damage in the foot, or poor circulation.  Home care: If you don t have an infection or any of the above medical conditions, you can soak your foot in room-temperature water (adding Epsom s salt may be recommended by your doctor), and gently massage the side of the nail fold to help reduce the inflammation.  Avoid attempting  bathroom surgery.  Repeated cutting of the nail can cause the condition to worsen over time. If your symptoms  fail to improve, it s time to see a foot and ankle surgeon.  Physician care: After examining the toe, the foot and ankle surgeon will select the treatment best suited for you. If an infection is present, an oral antibiotic may be prescribed.  Sometimes a minor surgical procedure, often performed in the office, will ease the pain and remove the offending nail. After applying a local anesthetic, the doctor removes part of the nail s side border. Some nails may become ingrown again, requiring removal of the nail root.  Following the nail procedure, a light bandage will be applied. Most people experience very little pain after surgery and may resume normal activity the next day. If your surgeon has prescribed an oral antibiotic, be sure to take all the medication, even if your symptoms have improved.  PREVENTION:  Proper Trimming: Cut toenails in a fairly straight line, and don t cut them too short. You should be able to get your fingernail under the sides and end of the nail.   Well-fitting Footwear: Don t wear shoes that are short or tight in the toe area. Avoid shoes that are loose, because they too cause pressure on the toes, especially when running or walking briskly.     INGROWN TOENAIL REMOVAL AFTERCARE   Go directly home and elevate the affected foot on one or two pillows for the remainder of the day/evening if possible. Your toe may stay numb anywhere from 2-8 hours.   Take Tylenol, ibuprofen or another anti-inflammatory as needed for pain.   Take antibiotic if that has been prescribed. Finish the entire prescribed antibiotic even if your symptoms have improved.   The evening of the procedure, soak/wash the affected area in warm water (you may add Epsom salt) for 5 to 10 minutes. Do this twice a day for 6-8 weeks (you may count showering/bathing as one soak).  After soaks, pat the area dry and then allow to airdry for a few minutes. Apply antibiotic ointment to the area and cover with 2 X 2 gauze and paper tape  or band-aid.  You may pursue everyday activities as tolerated with either an open toe shoe or cut-out shoe as needed or you may wear regular shoes if no pain is noted.  Watch for any signs and symptoms of infection such as: redness, red streaks going up the foot/leg, swelling, pus or foul odor. Those that have had the phenol procedure, the toe will drain longer and will look like it is infected because it is a chemical burn.   Please call with questions.

## 2023-12-31 DIAGNOSIS — F41.9 ANXIETY: ICD-10-CM

## 2024-01-02 RX ORDER — VENLAFAXINE HYDROCHLORIDE 75 MG/1
75 CAPSULE, EXTENDED RELEASE ORAL DAILY
Qty: 90 CAPSULE | Refills: 0 | Status: SHIPPED | OUTPATIENT
Start: 2024-01-02

## 2024-01-13 ENCOUNTER — HEALTH MAINTENANCE LETTER (OUTPATIENT)
Age: 44
End: 2024-01-13

## 2024-02-19 DIAGNOSIS — E11.65 TYPE 2 DIABETES MELLITUS WITH HYPERGLYCEMIA, WITHOUT LONG-TERM CURRENT USE OF INSULIN (H): ICD-10-CM

## 2024-02-20 RX ORDER — GLIPIZIDE 5 MG/1
5 TABLET, FILM COATED, EXTENDED RELEASE ORAL DAILY
Qty: 90 TABLET | Refills: 1 | Status: SHIPPED | OUTPATIENT
Start: 2024-02-20 | End: 2024-08-12

## 2024-05-14 ENCOUNTER — LAB (OUTPATIENT)
Dept: LAB | Facility: CLINIC | Age: 44
End: 2024-05-14
Payer: COMMERCIAL

## 2024-05-14 DIAGNOSIS — E11.9 DIABETES MELLITUS, TYPE 2 (H): Primary | ICD-10-CM

## 2024-05-14 LAB — HBA1C MFR BLD: 6.5 % (ref 0–5.6)

## 2024-05-14 PROCEDURE — 83036 HEMOGLOBIN GLYCOSYLATED A1C: CPT

## 2024-05-14 PROCEDURE — 36415 COLL VENOUS BLD VENIPUNCTURE: CPT

## 2024-06-27 ENCOUNTER — PATIENT OUTREACH (OUTPATIENT)
Dept: CARE COORDINATION | Facility: CLINIC | Age: 44
End: 2024-06-27
Payer: COMMERCIAL

## 2024-07-11 ENCOUNTER — PATIENT OUTREACH (OUTPATIENT)
Dept: CARE COORDINATION | Facility: CLINIC | Age: 44
End: 2024-07-11
Payer: COMMERCIAL

## 2024-07-29 ENCOUNTER — TELEPHONE (OUTPATIENT)
Dept: FAMILY MEDICINE | Facility: CLINIC | Age: 44
End: 2024-07-29
Payer: COMMERCIAL

## 2024-07-29 DIAGNOSIS — F33.9 RECURRENT MAJOR DEPRESSIVE DISORDER, REMISSION STATUS UNSPECIFIED (H): ICD-10-CM

## 2024-07-29 NOTE — TELEPHONE ENCOUNTER
Patient requesting historical medication to be re-prescribed. Was last prescribed in 2022.     Pending Prescriptions:                       Disp   Refills    sildenafil (REVATIO) 20 MG tablet         30 tab*0            Sig: Take 2-5 tablets ( mg) by mouth daily 30-60           minutes before anticipated need for erectile           dysfunction        Last Written Prescription Date:  2/10/22  Last Fill Quantity: 30,   # refills: 0  Last Office Visit: 10/5/23  Future Office visit:       Re-pended historical medication, please advise if OK to refill or if patient needs appointment first.     Summer RN 9:17 AM July 29, 2024   [facility]

## 2024-07-30 RX ORDER — SILDENAFIL CITRATE 20 MG/1
TABLET ORAL
Qty: 30 TABLET | Refills: 0 | Status: SHIPPED | OUTPATIENT
Start: 2024-07-30 | End: 2024-08-29

## 2024-08-11 DIAGNOSIS — E11.65 TYPE 2 DIABETES MELLITUS WITH HYPERGLYCEMIA, WITHOUT LONG-TERM CURRENT USE OF INSULIN (H): ICD-10-CM

## 2024-08-11 DIAGNOSIS — I10 HYPERTENSION GOAL BP (BLOOD PRESSURE) < 140/90: ICD-10-CM

## 2024-08-12 RX ORDER — LISINOPRIL 10 MG/1
10 TABLET ORAL DAILY
Qty: 90 TABLET | Refills: 0 | Status: SHIPPED | OUTPATIENT
Start: 2024-08-12

## 2024-08-12 RX ORDER — HYDROCHLOROTHIAZIDE 25 MG/1
25 TABLET ORAL DAILY
Qty: 90 TABLET | Refills: 0 | Status: SHIPPED | OUTPATIENT
Start: 2024-08-12

## 2024-08-12 RX ORDER — GLIPIZIDE 5 MG/1
5 TABLET, FILM COATED, EXTENDED RELEASE ORAL DAILY
Qty: 90 TABLET | Refills: 0 | Status: SHIPPED | OUTPATIENT
Start: 2024-08-12

## 2024-08-29 ENCOUNTER — MYC REFILL (OUTPATIENT)
Dept: FAMILY MEDICINE | Facility: CLINIC | Age: 44
End: 2024-08-29
Payer: COMMERCIAL

## 2024-08-29 DIAGNOSIS — F33.9 RECURRENT MAJOR DEPRESSIVE DISORDER, REMISSION STATUS UNSPECIFIED (H): ICD-10-CM

## 2024-08-29 RX ORDER — SILDENAFIL CITRATE 20 MG/1
TABLET ORAL
Qty: 30 TABLET | Refills: 0 | Status: SHIPPED | OUTPATIENT
Start: 2024-08-29 | End: 2024-10-02

## 2024-08-29 RX ORDER — SILDENAFIL CITRATE 20 MG/1
TABLET ORAL
Qty: 30 TABLET | Refills: 0 | OUTPATIENT
Start: 2024-08-29

## 2024-09-30 DIAGNOSIS — F33.9 RECURRENT MAJOR DEPRESSIVE DISORDER, REMISSION STATUS UNSPECIFIED (H): ICD-10-CM

## 2024-10-02 RX ORDER — SILDENAFIL CITRATE 20 MG/1
TABLET ORAL
Qty: 15 TABLET | Refills: 1 | Status: SHIPPED | OUTPATIENT
Start: 2024-10-02

## 2024-10-19 ENCOUNTER — HEALTH MAINTENANCE LETTER (OUTPATIENT)
Age: 44
End: 2024-10-19

## 2024-11-08 DIAGNOSIS — E11.65 TYPE 2 DIABETES MELLITUS WITH HYPERGLYCEMIA, WITHOUT LONG-TERM CURRENT USE OF INSULIN (H): ICD-10-CM

## 2024-11-08 DIAGNOSIS — I10 HYPERTENSION GOAL BP (BLOOD PRESSURE) < 140/90: ICD-10-CM

## 2024-11-08 RX ORDER — LISINOPRIL 10 MG/1
TABLET ORAL
Qty: 90 TABLET | Refills: 0 | Status: SHIPPED | OUTPATIENT
Start: 2024-11-08

## 2024-11-08 RX ORDER — GLIPIZIDE 5 MG/1
5 TABLET, FILM COATED, EXTENDED RELEASE ORAL DAILY
Qty: 90 TABLET | Refills: 0 | Status: SHIPPED | OUTPATIENT
Start: 2024-11-08

## 2024-11-08 RX ORDER — HYDROCHLOROTHIAZIDE 25 MG/1
25 TABLET ORAL DAILY
Qty: 90 TABLET | Refills: 0 | Status: SHIPPED | OUTPATIENT
Start: 2024-11-08

## 2024-11-14 DIAGNOSIS — F33.9 RECURRENT MAJOR DEPRESSIVE DISORDER, REMISSION STATUS UNSPECIFIED (H): ICD-10-CM

## 2024-11-15 RX ORDER — SILDENAFIL CITRATE 20 MG/1
TABLET ORAL
Qty: 45 TABLET | Refills: 1 | Status: SHIPPED | OUTPATIENT
Start: 2024-11-15

## 2024-11-19 NOTE — TELEPHONE ENCOUNTER
"Requested Prescriptions   Pending Prescriptions Disp Refills     omeprazole (PRILOSEC) 20 MG CR capsule  Last Written Prescription Date:  7/20/2017  Last Fill Quantity: 90 capsule,  # refills: 1   Last office visit: 2/26/2018 with prescribing provider:  Isael   Future Office Visit:   Next 5 appointments (look out 90 days)     Mar 26, 2018  8:40 AM CDT   [facility]land Jr., [facility]    [address] [id number] [phone]                90 capsule 1     Sig: Take 1 capsule (20 mg) by mouth daily    PPI Protocol Passed    2/27/2018  1:36 PM       Passed - Not on Clopidogrel (unless Pantoprazole ordered)       Passed - No diagnosis of osteoporosis on record       Passed - Recent or future visit with authorizing provider's specialty    Patient had office visit in the last year or has a visit in the next 30 days with authorizing provider.  See \"Patient Info\" tab in inbasket, or \"Choose Columns\" in Meds & Orders section of the refill encounter.          Passed - Patient is age 18 or older          "
Prescription approved per [facility] Refill Protocol.  Mary Burrell, RN, BSN  [facility]  
15

## 2025-01-26 ENCOUNTER — HEALTH MAINTENANCE LETTER (OUTPATIENT)
Age: 45
End: 2025-01-26

## 2025-02-06 ENCOUNTER — TELEPHONE (OUTPATIENT)
Dept: FAMILY MEDICINE | Facility: CLINIC | Age: 45
End: 2025-02-06
Payer: COMMERCIAL

## 2025-02-06 DIAGNOSIS — E11.65 TYPE 2 DIABETES MELLITUS WITH HYPERGLYCEMIA, WITHOUT LONG-TERM CURRENT USE OF INSULIN (H): ICD-10-CM

## 2025-02-06 DIAGNOSIS — I10 HYPERTENSION GOAL BP (BLOOD PRESSURE) < 140/90: ICD-10-CM

## 2025-02-06 RX ORDER — GLIPIZIDE 5 MG/1
5 TABLET, FILM COATED, EXTENDED RELEASE ORAL DAILY
Qty: 90 TABLET | Refills: 0 | Status: SHIPPED | OUTPATIENT
Start: 2025-02-06

## 2025-02-06 RX ORDER — HYDROCHLOROTHIAZIDE 25 MG/1
25 TABLET ORAL DAILY
Qty: 90 TABLET | Refills: 0 | Status: SHIPPED | OUTPATIENT
Start: 2025-02-06

## 2025-02-06 RX ORDER — LISINOPRIL 10 MG/1
TABLET ORAL
Qty: 90 TABLET | Refills: 0 | Status: SHIPPED | OUTPATIENT
Start: 2025-02-06

## 2025-02-06 NOTE — TELEPHONE ENCOUNTER
Medication Question or Refill    please see pending RX put in que today    What medication are you calling about (include dose and sig)?:     Pt requesting several of his meds (in que - pending) to pu as soon as possible.  Pharmacy had him call here.  [place].    Preferred Pharmacy:     [id number] - [place], [place] - [address]  [address]  [place] [address]  Phone: [phone] Fax: [phone]      Controlled Substance Agreement on file:   CSA -- Patient Level:    CSA: None found at the patient level.       Who prescribed the medication?: Dr. Longo    Do you need a refill? Yes    When did you use the medication last? na    Patient offered an appointment? No    Do you have any questions or concerns?  No      Could we send this information to you in [facility] or would you prefer to receive a phone call?:   Patient would prefer a phone call   Okay to leave a detailed message?: Yes at Cell number on file:    Telephone Information:   Mobile [phone]     Mikki BEN

## 2025-04-06 DIAGNOSIS — F33.9 RECURRENT MAJOR DEPRESSIVE DISORDER, REMISSION STATUS UNSPECIFIED: ICD-10-CM

## 2025-04-07 RX ORDER — SILDENAFIL CITRATE 20 MG/1
TABLET ORAL
Qty: 15 TABLET | Refills: 5 | Status: SHIPPED | OUTPATIENT
Start: 2025-04-07

## 2025-04-29 ENCOUNTER — OFFICE VISIT (OUTPATIENT)
Dept: FAMILY MEDICINE | Facility: CLINIC | Age: 45
End: 2025-04-29
Payer: COMMERCIAL

## 2025-04-29 VITALS
DIASTOLIC BLOOD PRESSURE: 68 MMHG | OXYGEN SATURATION: 98 % | HEIGHT: 72 IN | WEIGHT: 249 LBS | BODY MASS INDEX: 33.72 KG/M2 | SYSTOLIC BLOOD PRESSURE: 124 MMHG | RESPIRATION RATE: 14 BRPM | TEMPERATURE: 98.4 F | HEART RATE: 78 BPM

## 2025-04-29 DIAGNOSIS — F33.9 RECURRENT MAJOR DEPRESSIVE DISORDER, REMISSION STATUS UNSPECIFIED: ICD-10-CM

## 2025-04-29 DIAGNOSIS — I10 HYPERTENSION GOAL BP (BLOOD PRESSURE) < 140/90: ICD-10-CM

## 2025-04-29 DIAGNOSIS — Z13.6 SCREENING FOR CARDIOVASCULAR CONDITION: ICD-10-CM

## 2025-04-29 DIAGNOSIS — Z12.11 SCREEN FOR COLON CANCER: ICD-10-CM

## 2025-04-29 DIAGNOSIS — B35.1 ONYCHOMYCOSIS: ICD-10-CM

## 2025-04-29 DIAGNOSIS — E11.9 TYPE 2 DIABETES MELLITUS WITHOUT COMPLICATION, WITHOUT LONG-TERM CURRENT USE OF INSULIN (H): Primary | ICD-10-CM

## 2025-04-29 DIAGNOSIS — N52.9 ERECTILE DYSFUNCTION, UNSPECIFIED ERECTILE DYSFUNCTION TYPE: ICD-10-CM

## 2025-04-29 DIAGNOSIS — Z00.00 ROUTINE GENERAL MEDICAL EXAMINATION AT A HEALTH CARE FACILITY: ICD-10-CM

## 2025-04-29 LAB
EST. AVERAGE GLUCOSE BLD GHB EST-MCNC: 131 MG/DL
HBA1C MFR BLD: 6.2 % (ref 0–5.6)

## 2025-04-29 PROCEDURE — 36415 COLL VENOUS BLD VENIPUNCTURE: CPT | Performed by: FAMILY MEDICINE

## 2025-04-29 PROCEDURE — 99396 PREV VISIT EST AGE 40-64: CPT | Performed by: FAMILY MEDICINE

## 2025-04-29 PROCEDURE — G2211 COMPLEX E/M VISIT ADD ON: HCPCS | Performed by: FAMILY MEDICINE

## 2025-04-29 PROCEDURE — 80061 LIPID PANEL: CPT | Performed by: FAMILY MEDICINE

## 2025-04-29 PROCEDURE — 80053 COMPREHEN METABOLIC PANEL: CPT | Performed by: FAMILY MEDICINE

## 2025-04-29 PROCEDURE — 3078F DIAST BP <80 MM HG: CPT | Performed by: FAMILY MEDICINE

## 2025-04-29 PROCEDURE — 99214 OFFICE O/P EST MOD 30 MIN: CPT | Mod: 25 | Performed by: FAMILY MEDICINE

## 2025-04-29 PROCEDURE — 82043 UR ALBUMIN QUANTITATIVE: CPT | Performed by: FAMILY MEDICINE

## 2025-04-29 PROCEDURE — 82570 ASSAY OF URINE CREATININE: CPT | Performed by: FAMILY MEDICINE

## 2025-04-29 PROCEDURE — 1126F AMNT PAIN NOTED NONE PRSNT: CPT | Performed by: FAMILY MEDICINE

## 2025-04-29 PROCEDURE — 96127 BRIEF EMOTIONAL/BEHAV ASSMT: CPT | Performed by: FAMILY MEDICINE

## 2025-04-29 PROCEDURE — 83036 HEMOGLOBIN GLYCOSYLATED A1C: CPT | Performed by: FAMILY MEDICINE

## 2025-04-29 PROCEDURE — 3074F SYST BP LT 130 MM HG: CPT | Performed by: FAMILY MEDICINE

## 2025-04-29 RX ORDER — LISINOPRIL 10 MG/1
10 TABLET ORAL DAILY
Qty: 90 TABLET | Refills: 3 | Status: SHIPPED | OUTPATIENT
Start: 2025-04-29

## 2025-04-29 RX ORDER — HYDROCHLOROTHIAZIDE 25 MG/1
25 TABLET ORAL DAILY
Qty: 90 TABLET | Refills: 3 | Status: SHIPPED | OUTPATIENT
Start: 2025-04-29

## 2025-04-29 RX ORDER — TERBINAFINE HYDROCHLORIDE 250 MG/1
250 TABLET ORAL DAILY
Qty: 90 TABLET | Refills: 0 | Status: SHIPPED | OUTPATIENT
Start: 2025-04-29 | End: 2025-07-28

## 2025-04-29 RX ORDER — TADALAFIL 2.5 MG/1
2.5 TABLET ORAL EVERY 24 HOURS
Qty: 90 TABLET | Refills: 1 | Status: SHIPPED | OUTPATIENT
Start: 2025-04-29

## 2025-04-29 RX ORDER — GLIPIZIDE 5 MG/1
5 TABLET, FILM COATED, EXTENDED RELEASE ORAL DAILY
Qty: 90 TABLET | Refills: 0 | Status: SHIPPED | OUTPATIENT
Start: 2025-04-29

## 2025-04-29 SDOH — HEALTH STABILITY: PHYSICAL HEALTH: ON AVERAGE, HOW MANY DAYS PER WEEK DO YOU ENGAGE IN MODERATE TO STRENUOUS EXERCISE (LIKE A BRISK WALK)?: 3 DAYS

## 2025-04-29 ASSESSMENT — PAIN SCALES - GENERAL: PAINLEVEL_OUTOF10: NO PAIN (0)

## 2025-04-29 ASSESSMENT — PATIENT HEALTH QUESTIONNAIRE - PHQ9
10. IF YOU CHECKED OFF ANY PROBLEMS, HOW DIFFICULT HAVE THESE PROBLEMS MADE IT FOR YOU TO DO YOUR WORK, TAKE CARE OF THINGS AT HOME, OR GET ALONG WITH OTHER PEOPLE: SOMEWHAT DIFFICULT
SUM OF ALL RESPONSES TO PHQ QUESTIONS 1-9: 3
SUM OF ALL RESPONSES TO PHQ QUESTIONS 1-9: 3

## 2025-04-29 ASSESSMENT — ANXIETY QUESTIONNAIRES
GAD7 TOTAL SCORE: 6
6. BECOMING EASILY ANNOYED OR IRRITABLE: SEVERAL DAYS
3. WORRYING TOO MUCH ABOUT DIFFERENT THINGS: SEVERAL DAYS
8. IF YOU CHECKED OFF ANY PROBLEMS, HOW DIFFICULT HAVE THESE MADE IT FOR YOU TO DO YOUR WORK, TAKE CARE OF THINGS AT HOME, OR GET ALONG WITH OTHER PEOPLE?: SOMEWHAT DIFFICULT
IF YOU CHECKED OFF ANY PROBLEMS ON THIS QUESTIONNAIRE, HOW DIFFICULT HAVE THESE PROBLEMS MADE IT FOR YOU TO DO YOUR WORK, TAKE CARE OF THINGS AT HOME, OR GET ALONG WITH OTHER PEOPLE: SOMEWHAT DIFFICULT
2. NOT BEING ABLE TO STOP OR CONTROL WORRYING: SEVERAL DAYS
1. FEELING NERVOUS, ANXIOUS, OR ON EDGE: SEVERAL DAYS
4. TROUBLE RELAXING: SEVERAL DAYS
7. FEELING AFRAID AS IF SOMETHING AWFUL MIGHT HAPPEN: SEVERAL DAYS
GAD7 TOTAL SCORE: 6
GAD7 TOTAL SCORE: 6
5. BEING SO RESTLESS THAT IT IS HARD TO SIT STILL: NOT AT ALL
7. FEELING AFRAID AS IF SOMETHING AWFUL MIGHT HAPPEN: SEVERAL DAYS

## 2025-04-29 ASSESSMENT — SOCIAL DETERMINANTS OF HEALTH (SDOH): HOW OFTEN DO YOU GET TOGETHER WITH FRIENDS OR RELATIVES?: TWICE A WEEK

## 2025-04-29 NOTE — PATIENT INSTRUCTIONS
Patient Education   Preventive Care Advice   This is general advice given by our system to help you stay healthy. However, your care team may have specific advice just for you. Please talk to your care team about your preventive care needs.  Nutrition  Eat 5 or more servings of fruits and vegetables each day.  Try wheat bread, brown rice and whole grain pasta (instead of white bread, rice, and pasta).  Get enough calcium and vitamin D. Check the label on foods and aim for 100% of the RDA (recommended daily allowance).  Lifestyle  Exercise at least 150 minutes each week  (30 minutes a day, 5 days a week).  Do muscle strengthening activities 2 days a week. These help control your weight and prevent disease.  No smoking.  Wear sunscreen to prevent skin cancer.  Have a dental exam and cleaning every 6 months.  Yearly exams  See your health care team every year to talk about:  Any changes in your health.  Any medicines your care team has prescribed.  Preventive care, family planning, and ways to prevent chronic diseases.  Shots (vaccines)   HPV shots (up to age 26), if you've never had them before.  Hepatitis B shots (up to age 59), if you've never had them before.  COVID-19 shot: Get this shot when it's due.  Flu shot: Get a flu shot every year.  Tetanus shot: Get a tetanus shot every 10 years.  Pneumococcal, hepatitis A, and RSV shots: Ask your care team if you need these based on your risk.  Shingles shot (for age 50 and up)  General health tests  Diabetes screening:  Starting at age 35, Get screened for diabetes at least every 3 years.  If you are younger than age 35, ask your care team if you should be screened for diabetes.  Cholesterol test: At age 39, start having a cholesterol test every 5 years, or more often if advised.  Bone density scan (DEXA): At age 50, ask your care team if you should have this scan for osteoporosis (brittle bones).  Hepatitis C: Get tested at least once in your life.  STIs (sexually  transmitted infections)  Before age 24: Ask your care team if you should be screened for STIs.  After age 24: Get screened for STIs if you're at risk. You are at risk for STIs (including HIV) if:  You are sexually active with more than one person.  You don't use condoms every time.  You or a partner was diagnosed with a sexually transmitted infection.  If you are at risk for HIV, ask about PrEP medicine to prevent HIV.  Get tested for HIV at least once in your life, whether you are at risk for HIV or not.  Cancer screening tests  Cervical cancer screening: If you have a cervix, begin getting regular cervical cancer screening tests starting at age 21.  Breast cancer scan (mammogram): If you've ever had breasts, begin having regular mammograms starting at age 40. This is a scan to check for breast cancer.  Colon cancer screening: It is important to start screening for colon cancer at age 45.  Have a colonoscopy test every 10 years (or more often if you're at risk) Or, ask your provider about stool tests like a FIT test every year or Cologuard test every 3 years.  To learn more about your testing options, visit:   .  For help making a decision, visit:   https://bit.ly/va91559.  Prostate cancer screening test: If you have a prostate, ask your care team if a prostate cancer screening test (PSA) at age 55 is right for you.  Lung cancer screening: If you are a current or former smoker ages 50 to 80, ask your care team if ongoing lung cancer screenings are right for you.  For informational purposes only. Not to replace the advice of your health care provider. Copyright   2023 [employer]. All rights reserved. Clinically reviewed by the [facility]. Xtime 364783 - REV 01/24.

## 2025-04-29 NOTE — PROGRESS NOTES
Preventive Care Visit  [facility] [place]  Douglas Longo DO, Family Medicine  Apr 29, 2025      Assessment & Plan     Routine general medical examination at a health care facility  Health maintenance reviewed and updated. Emphasized importance of balanced diet and regular exercise.      Type 2 diabetes mellitus without complication, without long-term current use of insulin (H)  Well controlled. Continue current regimen. Recheck labs today.  - Adult Eye  Referral; Future  - Comprehensive metabolic panel (BMP + Alb, Alk Phos, ALT, AST, Total. Bili, TP); Future  - Lipid panel reflex to direct LDL Fasting; Future  - Hemoglobin A1c; Future  - Albumin Random Urine Quantitative with Creat Ratio; Future  - FOOT EXAM  - Comprehensive metabolic panel (BMP + Alb, Alk Phos, ALT, AST, Total. Bili, TP)  - Lipid panel reflex to direct LDL Fasting  - Hemoglobin A1c  - Albumin Random Urine Quantitative with Creat Ratio    Recurrent major depressive disorder, remission status unspecified  Stable off venlafaxine.     Erectile dysfunction, unspecified erectile dysfunction type  Start daily low-dose Cialis. He'll let me know if this is effective for him  - tadalafil (CIALIS) 2.5 MG tablet; Take 1 tablet (2.5 mg) by mouth every 24 hours.    Onychomycosis  History of bilateral toenail removals. Having issues with fungal infection and recurrent ingrown toenails. Start Lamisil. Recheck LFTs in 6 weeks. Consider podiatry referral  - Hepatic panel (Albumin, ALT, AST, Bili, Alk Phos, TP); Future  - terbinafine (LAMISIL) 250 MG tablet; Take 1 tablet (250 mg) by mouth daily.    Hypertension goal BP (blood pressure) < 140/90  Well controlled. Continue current medication  - Comprehensive metabolic panel (BMP + Alb, Alk Phos, ALT, AST, Total. Bili, TP); Future  - Albumin Random Urine Quantitative with Creat Ratio; Future  - hydrochlorothiazide (HYDRODIURIL) 25 MG tablet; Take 1 tablet (25 mg) by mouth daily.  -  lisinopril (ZESTRIL) 10 MG tablet; Take 1 tablet (10 mg) by mouth daily.  - Comprehensive metabolic panel (BMP + Alb, Alk Phos, ALT, AST, Total. Bili, TP)  - Albumin Random Urine Quantitative with Creat Ratio    Screening for cardiovascular condition    Screen for colon cancer  - Colonoscopy Screening  Referral; Future    Patient has been advised of split billing requirements and indicates understanding: Yes        BMI  Estimated body mass index is 33.77 kg/m  as calculated from the following:    Height as of this encounter: 1.829 m (6').    Weight as of this encounter: 112.9 kg (249 lb).       Counseling  Appropriate preventive services were addressed with this patient via screening, questionnaire, or discussion as appropriate for fall prevention, nutrition, physical activity, Tobacco-use cessation, social engagement, weight loss and cognition.  Checklist reviewing preventive services available has been given to the patient.  Reviewed patient's diet, addressing concerns and/or questions.   He is at risk for lack of exercise and has been provided with information to increase physical activity for the benefit of his well-being.   He is at risk for psychosocial distress and has been provided with information to reduce risk.         Will Antonio is a 44 year old, presenting for the following:  Physical        4/29/2025    11:10 AM   Additional Questions   Roomed by RICK Azevedo CMA   Accompanied by SELF          HPI       Diabetes Follow-up    How often are you checking your blood sugar? Not at all  What concerns do you have today about your diabetes? None   Do you have any of these symptoms? (Select all that apply)  No numbness or tingling in feet.  No redness, sores or blisters on feet.  No complaints of excessive thirst.  No reports of blurry vision.  No significant changes to weight.  Have you had a diabetic eye exam in the last 12 months? No      BP Readings from Last 2 Encounters:   04/29/25 124/68    10/05/23 124/82     Hemoglobin A1C (%)   Date Value   04/29/2025 6.2 (H)   05/14/2024 6.5 (H)     LDL Cholesterol Calculated (mg/dL)   Date Value   08/10/2023 87   05/22/2019 119 (H)   02/29/2016 106 (H)             Hypertension Follow-up    Do you check your blood pressure regularly outside of the clinic? No   Are you following a low salt diet? No  Are your blood pressures ever more than 140 on the top number (systolic) OR more   than 90 on the bottom number (diastolic), for example 140/90? N/A    Denies any headaches, lightheadedness, dizziness, vision changes, chest pain, palpitations, shortness of breath, dyspnea, numbness/tingling.    BP Readings from Last 6 Encounters:   04/29/25 124/68   10/05/23 124/82   07/27/23 126/80   06/30/23 (!) 125/92   04/30/22 (!) 149/98   04/14/22 114/86       Anxiety   How are you doing with your anxiety since your last visit? Lots of life stressors - moving and issues with son   Have you had a significant life event? Housing Concerns (due to mold)    Are you feeling depressed? No  Do you have any concerns with your use of alcohol or other drugs? No    Social History     Tobacco Use    Smoking status: Never     Passive exposure: Never    Smokeless tobacco: Never   Vaping Use    Vaping status: Never Used   Substance Use Topics    Alcohol use: Yes     Alcohol/week: 0.0 standard drinks of alcohol     Comment: per wk 1-2    Drug use: No         7/27/2023     1:16 PM 10/5/2023     9:10 AM 4/29/2025    11:03 AM   MAAME-7 SCORE   Total Score 5 (mild anxiety)  6 (mild anxiety)   Total Score 5 16 6        Patient-reported         7/27/2023     1:16 PM 10/5/2023     9:10 AM 4/29/2025    11:02 AM   PHQ   PHQ-9 Total Score 3 7 3    Q9: Thoughts of better off dead/self-harm past 2 weeks Not at all Not at all Not at all       Patient-reported         4/29/2025    11:02 AM   Last PHQ-9   1.  Little interest or pleasure in doing things 0   2.  Feeling down, depressed, or hopeless 1   3.  Trouble  falling or staying asleep, or sleeping too much 0   4.  Feeling tired or having little energy 1   5.  Poor appetite or overeating 0   6.  Feeling bad about yourself 0   7.  Trouble concentrating 1   8.  Moving slowly or restless 0   Q9: Thoughts of better off dead/self-harm past 2 weeks 0   PHQ-9 Total Score 3        Patient-reported         4/29/2025    11:03 AM   MAAME-7    1. Feeling nervous, anxious, or on edge 1   2. Not being able to stop or control worrying 1   3. Worrying too much about different things 1   4. Trouble relaxing 1   5. Being so restless that it is hard to sit still 0   6. Becoming easily annoyed or irritable 1   7. Feeling afraid, as if something awful might happen 1   MAAME-7 Total Score 6    If you checked any problems, how difficult have they made it for you to do your work, take care of things at home, or get along with other people? Somewhat difficult       Patient-reported       Advance Care Planning    Discussed advance care planning with patient; however, patient declined at this time.        4/29/2025   General Health   How would you rate your overall physical health? (!) FAIR   Feel stress (tense, anxious, or unable to sleep) To some extent   (!) STRESS CONCERN      4/29/2025   Nutrition   Three or more servings of calcium each day? Yes   Diet: I don't know   How many servings of fruit and vegetables per day? (!) 0-1   How many sweetened beverages each day? (!) 2         4/29/2025   Exercise   Days per week of moderate/strenous exercise 3 days         4/29/2025   Social Factors   Frequency of gathering with friends or relatives Twice a week   Worry food won't last until get money to buy more No   Food not last or not have enough money for food? No   Do you have housing? (Housing is defined as stable permanent housing and does not include staying outside in a car, in a tent, in an abandoned building, in an overnight shelter, or couch-surfing.) Yes   Are you worried about losing your  housing? No   Lack of transportation? No   Unable to get utilities (heat,electricity)? No         4/29/2025   Dental   Dentist two times every year? Yes       Today's PHQ-9 Score:       4/29/2025    11:02 AM   PHQ-9 SCORE   PHQ-9 Total Score MyChart 3 (Minimal depression)   PHQ-9 Total Score 3        Patient-reported         4/29/2025   Substance Use   Alcohol more than 3/day or more than 7/wk No   Do you use any other substances recreationally? No     Social History     Tobacco Use    Smoking status: Never     Passive exposure: Never    Smokeless tobacco: Never   Vaping Use    Vaping status: Never Used   Substance Use Topics    Alcohol use: Yes     Alcohol/week: 0.0 standard drinks of alcohol     Comment: per wk 1-2    Drug use: No           4/29/2025   STI Screening   New sexual partner(s) since last STI/HIV test? (!) YES   ASCVD Risk   The 10-year ASCVD risk score (Nichloas RHOADES, et al., 2019) is: 8.1%    Values used to calculate the score:      Age: 44 years      Sex: Male      Is Non- : No      Diabetic: Yes      Tobacco smoker: No      Systolic Blood Pressure: 124 mmHg      Is BP treated: Yes      HDL Cholesterol: 26 mg/dL      Total Cholesterol: 171 mg/dL        4/29/2025   Contraception/Family Planning   Questions about contraception or family planning No       Reviewed and updated as needed this visit by Provider   Tobacco  Allergies  Meds  Problems  Med Hx  Surg Hx  Fam Hx              Review of Systems     Constitutional, HEENT, cardiovascular, pulmonary, gi and gu systems are negative, except as otherwise noted.     Objective    Exam  /68   Pulse 78   Temp 98.4  F (36.9  C) (Tympanic)   Resp 14   Ht 1.829 m (6')   Wt 112.9 kg (249 lb)   SpO2 98%   BMI 33.77 kg/m     Estimated body mass index is 33.77 kg/m  as calculated from the following:    Height as of this encounter: 1.829 m (6').    Weight as of this encounter: 112.9 kg (249 lb).    Physical  Exam  GENERAL: alert and no distress  EYES: Eyes grossly normal to inspection  HENT: ear canals and TM's normal, nose and mouth without ulcers or lesions  NECK: no adenopathy, no asymmetry, masses, or scars  RESP: lungs clear to auscultation - no rales, rhonchi or wheezes  CV: regular rate and rhythm, normal S1 S2, no S3 or S4, no murmur, click or rub, no peripheral edema  MS: no gross musculoskeletal defects noted, no edema  Diabetic foot exam: normal DP and PT pulses, no trophic changes or ulcerative lesions, normal sensory exam, and normal monofilament exam      The longitudinal plan of care for the diagnosis(es)/condition(s) as documented were addressed during this visit. Due to the added complexity in care, I will continue to support Paco in the subsequent management and with ongoing continuity of care.    Signed Electronically by: Douglas Longo DO

## 2025-04-30 ENCOUNTER — PATIENT OUTREACH (OUTPATIENT)
Dept: CARE COORDINATION | Facility: CLINIC | Age: 45
End: 2025-04-30
Payer: COMMERCIAL

## 2025-04-30 LAB
ALBUMIN SERPL BCG-MCNC: 4.6 G/DL (ref 3.5–5.2)
ALP SERPL-CCNC: 94 U/L (ref 40–150)
ALT SERPL W P-5'-P-CCNC: 18 U/L (ref 0–70)
ANION GAP SERPL CALCULATED.3IONS-SCNC: 8 MMOL/L (ref 7–15)
AST SERPL W P-5'-P-CCNC: 25 U/L (ref 0–45)
BILIRUB SERPL-MCNC: 0.4 MG/DL
BUN SERPL-MCNC: 13.5 MG/DL (ref 6–20)
CALCIUM SERPL-MCNC: 9.8 MG/DL (ref 8.8–10.4)
CHLORIDE SERPL-SCNC: 101 MMOL/L (ref 98–107)
CHOLEST SERPL-MCNC: 174 MG/DL
CREAT SERPL-MCNC: 1.1 MG/DL (ref 0.67–1.17)
CREAT UR-MCNC: 132 MG/DL
EGFRCR SERPLBLD CKD-EPI 2021: 84 ML/MIN/1.73M2
FASTING STATUS PATIENT QL REPORTED: NO
FASTING STATUS PATIENT QL REPORTED: NO
GLUCOSE SERPL-MCNC: 114 MG/DL (ref 70–99)
HCO3 SERPL-SCNC: 31 MMOL/L (ref 22–29)
HDLC SERPL-MCNC: 30 MG/DL
LDLC SERPL CALC-MCNC: 77 MG/DL
MICROALBUMIN UR-MCNC: <12 MG/L
MICROALBUMIN/CREAT UR: NORMAL MG/G{CREAT}
NONHDLC SERPL-MCNC: 144 MG/DL
POTASSIUM SERPL-SCNC: 4.3 MMOL/L (ref 3.4–5.3)
PROT SERPL-MCNC: 7.5 G/DL (ref 6.4–8.3)
SODIUM SERPL-SCNC: 140 MMOL/L (ref 135–145)
TRIGL SERPL-MCNC: 335 MG/DL

## 2025-05-10 ENCOUNTER — RESULTS FOLLOW-UP (OUTPATIENT)
Dept: FAMILY MEDICINE | Facility: CLINIC | Age: 45
End: 2025-05-10

## 2025-07-20 DIAGNOSIS — E11.65 TYPE 2 DIABETES MELLITUS WITH HYPERGLYCEMIA (H): ICD-10-CM

## 2025-07-21 RX ORDER — GLIPIZIDE 5 MG/1
5 TABLET, FILM COATED, EXTENDED RELEASE ORAL DAILY
Qty: 90 TABLET | Refills: 0 | Status: SHIPPED | OUTPATIENT
Start: 2025-07-21

## 2025-08-16 ENCOUNTER — HEALTH MAINTENANCE LETTER (OUTPATIENT)
Age: 45
End: 2025-08-16